# Patient Record
Sex: MALE | Race: WHITE | NOT HISPANIC OR LATINO | Employment: UNEMPLOYED | URBAN - METROPOLITAN AREA
[De-identification: names, ages, dates, MRNs, and addresses within clinical notes are randomized per-mention and may not be internally consistent; named-entity substitution may affect disease eponyms.]

---

## 2018-01-03 ENCOUNTER — HOSPITAL ENCOUNTER (OUTPATIENT)
Dept: RADIOLOGY | Facility: MEDICAL CENTER | Age: 83
End: 2018-01-03
Attending: UROLOGY
Payer: MEDICARE

## 2018-01-03 DIAGNOSIS — C67.9 MALIGNANT NEOPLASM OF URINARY BLADDER, UNSPECIFIED SITE (HCC): ICD-10-CM

## 2018-01-03 PROCEDURE — A9552 F18 FDG: HCPCS

## 2018-06-15 ENCOUNTER — HOSPITAL ENCOUNTER (OUTPATIENT)
Facility: MEDICAL CENTER | Age: 83
End: 2018-06-15
Payer: MEDICARE

## 2018-06-26 ENCOUNTER — HOSPITAL ENCOUNTER (INPATIENT)
Facility: MEDICAL CENTER | Age: 83
LOS: 22 days | DRG: 329 | End: 2018-07-18
Attending: EMERGENCY MEDICINE | Admitting: SURGERY
Payer: MEDICARE

## 2018-06-26 ENCOUNTER — APPOINTMENT (OUTPATIENT)
Dept: RADIOLOGY | Facility: MEDICAL CENTER | Age: 83
DRG: 329 | End: 2018-06-26
Attending: HOSPITALIST
Payer: MEDICARE

## 2018-06-26 ENCOUNTER — HOSPITAL ENCOUNTER (OUTPATIENT)
Dept: RADIOLOGY | Facility: MEDICAL CENTER | Age: 83
End: 2018-06-26

## 2018-06-26 DIAGNOSIS — Z98.890 HISTORY OF UROSTOMY: ICD-10-CM

## 2018-06-26 DIAGNOSIS — E87.20 LACTIC ACID ACIDOSIS: ICD-10-CM

## 2018-06-26 DIAGNOSIS — K56.609 SBO (SMALL BOWEL OBSTRUCTION) (HCC): ICD-10-CM

## 2018-06-26 DIAGNOSIS — E78.5 HYPERLIPIDEMIA, UNSPECIFIED HYPERLIPIDEMIA TYPE: ICD-10-CM

## 2018-06-26 DIAGNOSIS — I10 ESSENTIAL HYPERTENSION: ICD-10-CM

## 2018-06-26 DIAGNOSIS — Z85.51 HISTORY OF BLADDER CANCER: ICD-10-CM

## 2018-06-26 PROBLEM — E11.9 T2DM (TYPE 2 DIABETES MELLITUS) (HCC): Status: ACTIVE | Noted: 2018-06-26

## 2018-06-26 LAB
ALBUMIN SERPL BCP-MCNC: 3.4 G/DL (ref 3.2–4.9)
ALBUMIN/GLOB SERPL: 1.2 G/DL
ALP SERPL-CCNC: 62 U/L (ref 30–99)
ALT SERPL-CCNC: 42 U/L (ref 2–50)
ANION GAP SERPL CALC-SCNC: 9 MMOL/L (ref 0–11.9)
APPEARANCE UR: ABNORMAL
AST SERPL-CCNC: 25 U/L (ref 12–45)
BACTERIA #/AREA URNS HPF: ABNORMAL /HPF
BASOPHILS # BLD AUTO: 0.4 % (ref 0–1.8)
BASOPHILS # BLD: 0.04 K/UL (ref 0–0.12)
BILIRUB SERPL-MCNC: 0.4 MG/DL (ref 0.1–1.5)
BILIRUB UR QL STRIP.AUTO: NEGATIVE
BUN SERPL-MCNC: 30 MG/DL (ref 8–22)
CALCIUM SERPL-MCNC: 8.6 MG/DL (ref 8.5–10.5)
CHLORIDE SERPL-SCNC: 106 MMOL/L (ref 96–112)
CO2 SERPL-SCNC: 20 MMOL/L (ref 20–33)
COLOR UR: YELLOW
CREAT SERPL-MCNC: 1.12 MG/DL (ref 0.5–1.4)
EKG IMPRESSION: NORMAL
EOSINOPHIL # BLD AUTO: 0.01 K/UL (ref 0–0.51)
EOSINOPHIL NFR BLD: 0.1 % (ref 0–6.9)
EPI CELLS #/AREA URNS HPF: ABNORMAL /HPF
ERYTHROCYTE [DISTWIDTH] IN BLOOD BY AUTOMATED COUNT: 52.2 FL (ref 35.9–50)
GLOBULIN SER CALC-MCNC: 2.9 G/DL (ref 1.9–3.5)
GLUCOSE SERPL-MCNC: 156 MG/DL (ref 65–99)
GLUCOSE UR STRIP.AUTO-MCNC: NEGATIVE MG/DL
HCT VFR BLD AUTO: 33.4 % (ref 42–52)
HGB BLD-MCNC: 11 G/DL (ref 14–18)
HYALINE CASTS #/AREA URNS LPF: ABNORMAL /LPF
IMM GRANULOCYTES # BLD AUTO: 0.2 K/UL (ref 0–0.11)
IMM GRANULOCYTES NFR BLD AUTO: 2 % (ref 0–0.9)
KETONES UR STRIP.AUTO-MCNC: NEGATIVE MG/DL
LACTATE BLD-SCNC: 1.7 MMOL/L (ref 0.5–2)
LACTATE BLD-SCNC: 2.1 MMOL/L (ref 0.5–2)
LACTATE BLD-SCNC: 2.4 MMOL/L (ref 0.5–2)
LEUKOCYTE ESTERASE UR QL STRIP.AUTO: ABNORMAL
LIPASE SERPL-CCNC: 12 U/L (ref 11–82)
LYMPHOCYTES # BLD AUTO: 0.48 K/UL (ref 1–4.8)
LYMPHOCYTES NFR BLD: 4.7 % (ref 22–41)
MCH RBC QN AUTO: 30.4 PG (ref 27–33)
MCHC RBC AUTO-ENTMCNC: 32.9 G/DL (ref 33.7–35.3)
MCV RBC AUTO: 92.3 FL (ref 81.4–97.8)
MICRO URNS: ABNORMAL
MONOCYTES # BLD AUTO: 0.66 K/UL (ref 0–0.85)
MONOCYTES NFR BLD AUTO: 6.5 % (ref 0–13.4)
NEUTROPHILS # BLD AUTO: 8.78 K/UL (ref 1.82–7.42)
NEUTROPHILS NFR BLD: 86.3 % (ref 44–72)
NITRITE UR QL STRIP.AUTO: POSITIVE
NRBC # BLD AUTO: 0 K/UL
NRBC BLD-RTO: 0 /100 WBC
PH UR STRIP.AUTO: 6.5 [PH]
PLATELET # BLD AUTO: 184 K/UL (ref 164–446)
PMV BLD AUTO: 9.9 FL (ref 9–12.9)
POTASSIUM SERPL-SCNC: 3.8 MMOL/L (ref 3.6–5.5)
PROT SERPL-MCNC: 6.3 G/DL (ref 6–8.2)
PROT UR QL STRIP: NEGATIVE MG/DL
RBC # BLD AUTO: 3.62 M/UL (ref 4.7–6.1)
RBC # URNS HPF: ABNORMAL /HPF
RBC UR QL AUTO: ABNORMAL
RENAL EPI CELLS #/AREA URNS HPF: ABNORMAL /HPF
SODIUM SERPL-SCNC: 135 MMOL/L (ref 135–145)
SP GR UR STRIP.AUTO: 1.01
TROPONIN I SERPL-MCNC: 0.02 NG/ML (ref 0–0.04)
UROBILINOGEN UR STRIP.AUTO-MCNC: 0.2 MG/DL
WBC # BLD AUTO: 10.2 K/UL (ref 4.8–10.8)
WBC #/AREA URNS HPF: ABNORMAL /HPF

## 2018-06-26 PROCEDURE — 96374 THER/PROPH/DIAG INJ IV PUSH: CPT

## 2018-06-26 PROCEDURE — 93005 ELECTROCARDIOGRAM TRACING: CPT | Performed by: EMERGENCY MEDICINE

## 2018-06-26 PROCEDURE — 700111 HCHG RX REV CODE 636 W/ 250 OVERRIDE (IP): Performed by: EMERGENCY MEDICINE

## 2018-06-26 PROCEDURE — 96376 TX/PRO/DX INJ SAME DRUG ADON: CPT

## 2018-06-26 PROCEDURE — 96375 TX/PRO/DX INJ NEW DRUG ADDON: CPT

## 2018-06-26 PROCEDURE — 85025 COMPLETE CBC W/AUTO DIFF WBC: CPT

## 2018-06-26 PROCEDURE — 84484 ASSAY OF TROPONIN QUANT: CPT

## 2018-06-26 PROCEDURE — 83690 ASSAY OF LIPASE: CPT

## 2018-06-26 PROCEDURE — 700111 HCHG RX REV CODE 636 W/ 250 OVERRIDE (IP): Performed by: HOSPITALIST

## 2018-06-26 PROCEDURE — 99223 1ST HOSP IP/OBS HIGH 75: CPT | Mod: AI | Performed by: HOSPITALIST

## 2018-06-26 PROCEDURE — 80053 COMPREHEN METABOLIC PANEL: CPT

## 2018-06-26 PROCEDURE — 36415 COLL VENOUS BLD VENIPUNCTURE: CPT

## 2018-06-26 PROCEDURE — 81001 URINALYSIS AUTO W/SCOPE: CPT

## 2018-06-26 PROCEDURE — 770006 HCHG ROOM/CARE - MED/SURG/GYN SEMI*

## 2018-06-26 PROCEDURE — 83605 ASSAY OF LACTIC ACID: CPT

## 2018-06-26 PROCEDURE — 700101 HCHG RX REV CODE 250: Performed by: EMERGENCY MEDICINE

## 2018-06-26 PROCEDURE — 700105 HCHG RX REV CODE 258: Performed by: HOSPITALIST

## 2018-06-26 PROCEDURE — 99285 EMERGENCY DEPT VISIT HI MDM: CPT

## 2018-06-26 RX ORDER — LABETALOL HYDROCHLORIDE 5 MG/ML
10 INJECTION, SOLUTION INTRAVENOUS EVERY 4 HOURS PRN
Status: DISCONTINUED | OUTPATIENT
Start: 2018-06-26 | End: 2018-07-18 | Stop reason: HOSPADM

## 2018-06-26 RX ORDER — METOPROLOL SUCCINATE 25 MG/1
25 TABLET, EXTENDED RELEASE ORAL DAILY
COMMUNITY
End: 2018-12-27

## 2018-06-26 RX ORDER — LORAZEPAM 2 MG/ML
0.5 INJECTION INTRAMUSCULAR EVERY 6 HOURS PRN
Status: DISCONTINUED | OUTPATIENT
Start: 2018-06-26 | End: 2018-06-29

## 2018-06-26 RX ORDER — MORPHINE SULFATE 4 MG/ML
4 INJECTION, SOLUTION INTRAMUSCULAR; INTRAVENOUS
Status: DISCONTINUED | OUTPATIENT
Start: 2018-06-26 | End: 2018-06-29

## 2018-06-26 RX ORDER — MORPHINE SULFATE 4 MG/ML
4 INJECTION, SOLUTION INTRAMUSCULAR; INTRAVENOUS ONCE
Status: COMPLETED | OUTPATIENT
Start: 2018-06-26 | End: 2018-06-26

## 2018-06-26 RX ORDER — OXYCODONE HYDROCHLORIDE 5 MG/1
5 TABLET ORAL
Status: DISCONTINUED | OUTPATIENT
Start: 2018-06-26 | End: 2018-06-29

## 2018-06-26 RX ORDER — LORAZEPAM 1 MG/1
0.5 TABLET ORAL EVERY 6 HOURS PRN
Status: DISCONTINUED | OUTPATIENT
Start: 2018-06-26 | End: 2018-06-29

## 2018-06-26 RX ORDER — SODIUM CHLORIDE 9 MG/ML
INJECTION, SOLUTION INTRAVENOUS CONTINUOUS
Status: DISCONTINUED | OUTPATIENT
Start: 2018-06-26 | End: 2018-06-29

## 2018-06-26 RX ORDER — OXYCODONE HYDROCHLORIDE 10 MG/1
10 TABLET ORAL
Status: DISCONTINUED | OUTPATIENT
Start: 2018-06-26 | End: 2018-06-29

## 2018-06-26 RX ORDER — DEXTROSE MONOHYDRATE, SODIUM CHLORIDE, AND POTASSIUM CHLORIDE 50; 1.49; 4.5 G/1000ML; G/1000ML; G/1000ML
INJECTION, SOLUTION INTRAVENOUS ONCE
Status: COMPLETED | OUTPATIENT
Start: 2018-06-26 | End: 2018-06-26

## 2018-06-26 RX ORDER — HYDROMORPHONE HYDROCHLORIDE 2 MG/ML
1 INJECTION, SOLUTION INTRAMUSCULAR; INTRAVENOUS; SUBCUTANEOUS ONCE
Status: COMPLETED | OUTPATIENT
Start: 2018-06-26 | End: 2018-06-26

## 2018-06-26 RX ORDER — ONDANSETRON 2 MG/ML
4 INJECTION INTRAMUSCULAR; INTRAVENOUS EVERY 4 HOURS PRN
Status: DISCONTINUED | OUTPATIENT
Start: 2018-06-26 | End: 2018-07-01

## 2018-06-26 RX ADMIN — MORPHINE SULFATE 4 MG: 4 INJECTION INTRAVENOUS at 13:48

## 2018-06-26 RX ADMIN — HYDROMORPHONE HYDROCHLORIDE 1 MG: 2 INJECTION INTRAMUSCULAR; INTRAVENOUS; SUBCUTANEOUS at 04:05

## 2018-06-26 RX ADMIN — POTASSIUM CHLORIDE, DEXTROSE MONOHYDRATE AND SODIUM CHLORIDE: 150; 5; 450 INJECTION, SOLUTION INTRAVENOUS at 02:39

## 2018-06-26 RX ADMIN — ONDANSETRON 4 MG: 2 INJECTION INTRAMUSCULAR; INTRAVENOUS at 10:37

## 2018-06-26 RX ADMIN — MORPHINE SULFATE 4 MG: 4 INJECTION INTRAVENOUS at 02:18

## 2018-06-26 RX ADMIN — ONDANSETRON 4 MG: 2 INJECTION INTRAMUSCULAR; INTRAVENOUS at 17:58

## 2018-06-26 RX ADMIN — HYDROMORPHONE HYDROCHLORIDE 1 MG: 2 INJECTION, SOLUTION INTRAMUSCULAR; INTRAVENOUS; SUBCUTANEOUS at 02:49

## 2018-06-26 RX ADMIN — MORPHINE SULFATE 4 MG: 4 INJECTION INTRAVENOUS at 17:59

## 2018-06-26 RX ADMIN — MORPHINE SULFATE 4 MG: 4 INJECTION INTRAVENOUS at 09:24

## 2018-06-26 RX ADMIN — SODIUM CHLORIDE: 9 INJECTION, SOLUTION INTRAVENOUS at 09:23

## 2018-06-26 RX ADMIN — MORPHINE SULFATE 4 MG: 4 INJECTION INTRAVENOUS at 06:14

## 2018-06-26 RX ADMIN — LORAZEPAM 0.5 MG: 2 INJECTION INTRAMUSCULAR; INTRAVENOUS at 14:06

## 2018-06-26 RX ADMIN — ONDANSETRON 4 MG: 2 INJECTION INTRAMUSCULAR; INTRAVENOUS at 06:14

## 2018-06-26 RX ADMIN — SODIUM CHLORIDE: 9 INJECTION, SOLUTION INTRAVENOUS at 22:03

## 2018-06-26 ASSESSMENT — ENCOUNTER SYMPTOMS
PHOTOPHOBIA: 0
TINGLING: 0
COUGH: 0
NAUSEA: 1
SORE THROAT: 0
FOCAL WEAKNESS: 0
ABDOMINAL PAIN: 1
DIZZINESS: 0
FEVER: 0
PALPITATIONS: 0
HEADACHES: 0
SHORTNESS OF BREATH: 0
DEPRESSION: 0
WHEEZING: 0
CHILLS: 0
MYALGIAS: 0
VOMITING: 0
DIARRHEA: 0

## 2018-06-26 ASSESSMENT — PAIN SCALES - GENERAL
PAINLEVEL_OUTOF10: 7
PAINLEVEL_OUTOF10: 6
PAINLEVEL_OUTOF10: 6
PAINLEVEL_OUTOF10: 7
PAINLEVEL_OUTOF10: 4
PAINLEVEL_OUTOF10: 5
PAINLEVEL_OUTOF10: 6
PAINLEVEL_OUTOF10: 8
PAINLEVEL_OUTOF10: 8
PAINLEVEL_OUTOF10: 4
PAINLEVEL_OUTOF10: 7

## 2018-06-26 ASSESSMENT — PATIENT HEALTH QUESTIONNAIRE - PHQ9
1. LITTLE INTEREST OR PLEASURE IN DOING THINGS: NOT AT ALL
SUM OF ALL RESPONSES TO PHQ9 QUESTIONS 1 AND 2: 0
2. FEELING DOWN, DEPRESSED, IRRITABLE, OR HOPELESS: NOT AT ALL

## 2018-06-26 ASSESSMENT — LIFESTYLE VARIABLES
TOTAL SCORE: 0
HAVE PEOPLE ANNOYED YOU BY CRITICIZING YOUR DRINKING: NO
HAVE YOU EVER FELT YOU SHOULD CUT DOWN ON YOUR DRINKING: NO
EVER_SMOKED: YES
ALCOHOL_USE: YES
TOTAL SCORE: 0
EVER FELT BAD OR GUILTY ABOUT YOUR DRINKING: NO
EVER HAD A DRINK FIRST THING IN THE MORNING TO STEADY YOUR NERVES TO GET RID OF A HANGOVER: NO
ON A TYPICAL DAY WHEN YOU DRINK ALCOHOL HOW MANY DRINKS DO YOU HAVE: 1
AVERAGE NUMBER OF DAYS PER WEEK YOU HAVE A DRINK CONTAINING ALCOHOL: 7
HOW MANY TIMES IN THE PAST YEAR HAVE YOU HAD 5 OR MORE DRINKS IN A DAY: 0
CONSUMPTION TOTAL: NEGATIVE
TOTAL SCORE: 0

## 2018-06-26 NOTE — ED PROVIDER NOTES
ED PROVIDER NOTE     Scribed for Kolton Jordan M.D. by Jarett Linares. 6/26/2018, 1:39 AM.    CHIEF COMPLAINT  Chief Complaint   Patient presents with   • Abdominal Pain       HPI    [facility]  Means of arrival: EMS  History obtained from: Patient  History limited by: None    Florian Trevizo is a 83 y.o. male who presents via EMS as a transfer from [place], [place] with a bowel obstruction confirmed with a CT at outside hospital. The patient reports he was eating dinner yesterday evening when suddenly he felt ill and had moderate abdominal pain and vomiting. Reports his last bowel movement was over two days ago. This is the patient's 2nd bowel obstruction. His last abdominal surgery reports to be on 2/12/18. Upon exam, he confirms feeling significantly improved after NG tube placed at outside hospital. Associated nausea and vomiting. No exacerbating factors noted. He denies fever, cough, chest pain, shortness of breath, or any other symptoms.     The patient has a past surgical history of an appendectomy, bladder removal, urostomy placement. He is currently receiving chemotherapy secondary to bladder cancer. Florian leonard received treatment on 6/12/18. [facility]    REVIEW OF SYSTEMS  Constitutional: Negative for fever or chills.   HENT: Negative for nosebleeds or sore throat.    Respiratory: Negative for cough or shortness of breath.    Cardiovascular: Negative for chest pain or palpitations.   Gastrointestinal: Positive for nausea, vomiting, abdominal pain.   Musculoskeletal: Negative for back pain or joint pain.   Skin: Negative for itching or rash.   Neurological: Negative for sensory or motor changes.   All other systems reviewed and are negative. C.     PAST MEDICAL HISTORY   has a past medical history of Cancer (HCC); Mitral valve prolapse; and Pacemaker.    PAST FAMILY HISTORY  History reviewed. No pertinent family history.    SOCIAL HISTORY  Social History  "    Social History Main Topics   • Smoking status: Former Smoker     Types: Cigars     Quit date: 6/5/2018   • Smokeless tobacco: Never Used   • Alcohol use Yes      Comment: 1 drink/day   • Drug use: No       SURGICAL HISTORY   has a past surgical history that includes other abdominal surgery (02/2018); appendectomy; and penile prosthesis insertion.    CURRENT MEDICATIONS  Home Medications     Reviewed by Shruthi Agrawal R.N. (Registered Nurse) on 06/26/18 at 0602  Med List Status: Not Addressed   Medication Last Dose Status   metoprolol SR (TOPROL XL) 25 MG TABLET SR 24 HR  Active                ALLERGIES  No Known Allergies    PHYSICAL EXAM  VITAL SIGNS: /85   Pulse 81   Temp 36.5 °C (97.7 °F)   Resp 18   Ht 1.854 m (6' 1\")   Wt 59.4 kg (131 lb)   BMI 17.28 kg/m²    Pulse ox interpretation: On room air, I interpret this pulse ox as normal.  Constitutional: Chronically ill-appearing in mild distress.  HEENT: Normocephalic, atraumatic. Posterior pharynx clear, mucous membranes dry. NGT in place.   Eyes:  EOMI. Normal sclera. Pale conjunctivae.  Neck: Supple, Full range of motion, nontender.  Chest/Pulmonary: Clear to ausculation bilaterally, no wheezes or rhonchi.  Cardiovascular: Regular rate and rhythm, no murmur.   Abdomen: Soft, mild epigastric tenderness, no rebound, guarding, or masses. Urostomy tube site c/d/I with yellow urine in bag.  Back: No CVA tenderness, nontender midline, no step offs.  Musculoskeletal: No deformity, no edema.  Neuro: Clear speech, normal coordination, cranial nerves II-XII grossly intact.  Psych: Normal mood and affect.  Skin: No rashes, warm and dry.    DIAGNOSTIC STUDIES / PROCEDURES    LABS & EKG  Results for orders placed or performed during the hospital encounter of 06/26/18   LACTIC ACID   Result Value Ref Range    Lactic Acid 2.4 (H) 0.5 - 2.0 mmol/L   CBC WITH DIFFERENTIAL   Result Value Ref Range    WBC 10.2 4.8 - 10.8 K/uL    RBC 3.62 (L) 4.70 - 6.10 M/uL "    Hemoglobin 11.0 (L) 14.0 - 18.0 g/dL    Hematocrit 33.4 (L) 42.0 - 52.0 %    MCV 92.3 81.4 - 97.8 fL    MCH 30.4 27.0 - 33.0 pg    MCHC 32.9 (L) 33.7 - 35.3 g/dL    RDW 52.2 (H) 35.9 - 50.0 fL    Platelet Count 184 164 - 446 K/uL    MPV 9.9 9.0 - 12.9 fL    Neutrophils-Polys 86.30 (H) 44.00 - 72.00 %    Lymphocytes 4.70 (L) 22.00 - 41.00 %    Monocytes 6.50 0.00 - 13.40 %    Eosinophils 0.10 0.00 - 6.90 %    Basophils 0.40 0.00 - 1.80 %    Immature Granulocytes 2.00 (H) 0.00 - 0.90 %    Nucleated RBC 0.00 /100 WBC    Neutrophils (Absolute) 8.78 (H) 1.82 - 7.42 K/uL    Lymphs (Absolute) 0.48 (L) 1.00 - 4.80 K/uL    Monos (Absolute) 0.66 0.00 - 0.85 K/uL    Eos (Absolute) 0.01 0.00 - 0.51 K/uL    Baso (Absolute) 0.04 0.00 - 0.12 K/uL    Immature Granulocytes (abs) 0.20 (H) 0.00 - 0.11 K/uL    NRBC (Absolute) 0.00 K/uL   CMP   Result Value Ref Range    Sodium 135 135 - 145 mmol/L    Potassium 3.8 3.6 - 5.5 mmol/L    Chloride 106 96 - 112 mmol/L    Co2 20 20 - 33 mmol/L    Anion Gap 9.0 0.0 - 11.9    Glucose 156 (H) 65 - 99 mg/dL    Bun 30 (H) 8 - 22 mg/dL    Creatinine 1.12 0.50 - 1.40 mg/dL    Calcium 8.6 8.5 - 10.5 mg/dL    AST(SGOT) 25 12 - 45 U/L    ALT(SGPT) 42 2 - 50 U/L    Alkaline Phosphatase 62 30 - 99 U/L    Total Bilirubin 0.4 0.1 - 1.5 mg/dL    Albumin 3.4 3.2 - 4.9 g/dL    Total Protein 6.3 6.0 - 8.2 g/dL    Globulin 2.9 1.9 - 3.5 g/dL    A-G Ratio 1.2 g/dL   LIPASE   Result Value Ref Range    Lipase 12 11 - 82 U/L   TROPONIN   Result Value Ref Range    Troponin I 0.02 0.00 - 0.04 ng/mL   ESTIMATED GFR   Result Value Ref Range    GFR If African American >60 >60 mL/min/1.73 m 2    GFR If Non African American >60 >60 mL/min/1.73 m 2   LACTIC ACID   Result Value Ref Range    Lactic Acid 1.7 0.5 - 2.0 mmol/L   LACTIC ACID   Result Value Ref Range    Lactic Acid 2.1 (H) 0.5 - 2.0 mmol/L   URINALYSIS   Result Value Ref Range    Color Yellow     Character Hazy (A)     Specific Gravity 1.010 <1.035    Ph 6.5  5.0 - 8.0    Glucose Negative Negative mg/dL    Ketones Negative Negative mg/dL    Protein Negative Negative mg/dL    Bilirubin Negative Negative    Urobilinogen, Urine 0.2 Negative    Nitrite Positive (A) Negative    Leukocyte Esterase Moderate (A) Negative    Occult Blood Trace (A) Negative    Micro Urine Req Microscopic    URINE MICROSCOPIC (W/UA)   Result Value Ref Range    WBC 20-50 (A) /hpf    RBC 2-5 (A) /hpf    Bacteria Many (A) None /hpf    Epithelial Cells Rare /hpf    Epithelial Cells Renal Few /hpf    Hyaline Cast 3-5 (A) /lpf   EKG (ER)   Result Value Ref Range    Report       [facility] Emergency Dept.    Test Date:  2018  Pt Name:    CHICHO NAVA                 Department: ER  MRN:        [id number]                      Room:       T4  Gender:     Male                         Technician: [id number]  :        [id number]                   Requested By:KOLTON TURPIN  Order #:    [id number]                    Reading MD: Kolton Turpin MD    Measurements  Intervals                                Axis  Rate:       70                           P:          124  VT:         216                          QRS:        54  QRSD:       94                           T:          53  QT:         428  QTc:        462    Interpretive Statements  ATRIAL-PACED COMPLEXES  BORDERLINE AV CONDUCTION DELAY  no STEMI  No previous ECG available for comparison    Electronically Signed On 2018 18:09:18 PDT by Kolton Turpin MD           COURSE & MEDICAL DECISION MAKING    This is a 83 y.o. male who presents with vomiting and abdominal pain, dx'd with SBO at outside hospital w/o surgical coverage.     Differential Diagnosis includes but is not limited to:  Small bowel obstruction, Malignancy, ACS, Hepatobiliary, Dehydration.     1:40 AM Patient was evaluated at bedside. Ordered for Lactic Acid, CBC with Differential, CMP, Lipase, Troponin, and EKG to evaluate. Patient will be treated with Dextrose  5% and 0.45% NaCl with KCl 20 mEq MIVF infusion since needs to be kept NPO.     1:48 AM Paged General Surgery.     1:56 AM I discussed the patient's case and the above findings with Dr. Bobby ([facility]) who agrees to consult, requests medical admission.    2:13 AM Patient will be treated with Morphine Injection 4 mg.     2:23 AM I discussed the patient's case and the above findings with Dr. Bonilla (Hospitalist) who agrees to admit the patient.     Labs stable aside from mild acidosis. IV fluids infusing. Pain medications ordered, stable vitals. He is hemodynamically stable for admission to medical floor in guarded condition, NGT still draining well. Outside CT reviewed.       Medications   NS infusion ( Intravenous New Bag 6/26/18 0964)   ondansetron (ZOFRAN) syringe/vial injection 4 mg (4 mg Intravenous Given 6/26/18 1758)   LORazepam (ATIVAN) tablet 0.5 mg ( Oral See Alternative 6/26/18 1406)     Or   LORazepam (ATIVAN) injection 0.5 mg (0.5 mg Intravenous Given 6/26/18 1406)   Pharmacy Consult Request ...Pain Management Review (not administered)     And   oxyCODONE immediate-release (ROXICODONE) tablet 5 mg (not administered)     And   oxyCODONE immediate release (ROXICODONE) tablet 10 mg (not administered)     And   morphine (pf) 4 mg/ml injection 4 mg (4 mg Intravenous Given 6/26/18 1759)   labetalol (NORMODYNE,TRANDATE) injection 10 mg (not administered)   dextrose 5 % and 0.45 % NaCl with KCl 20 mEq ( Intravenous New Bag 6/26/18 0239)   morphine (pf) 4 mg/ml injection 4 mg (4 mg Intravenous Given 6/26/18 0218)   HYDROmorphone (DILAUDID) injection 1 mg (1 mg Intravenous Given 6/26/18 0249)   HYDROmorphone (DILAUDID) injection 1 mg (1 mg Intravenous Given 6/26/18 0405)     FINAL IMPRESSION  1. SBO (small bowel obstruction) (HCC)    2. History of urostomy    3. History of bladder cancer    4. Essential hypertension    5. Hyperlipidemia, unspecified hyperlipidemia type    6. Lactic acid acidosis           -ADMIT-    Pertinent Labs & Imaging studies reviewed and verified by myself, as well as nursing notes and the patient's past medical, family, and social histories (See chart for details).    Results, exam findings, clinical impression, presumed diagnosis, treatment options, and plan to admit were discussed with the patient, and they verbalized understanding, agreed with, and appreciated the plan of care.    Jarett ZUNIGA (Scribe), am scribing for, and in the presence of, Kolton Jordan M.D..    Electronically signed by: Jarett Linares (Scribe), 6/26/2018    IKolton M.D. personally performed the services described in this documentation, as scribed by Jarett Linares in my presence, and it is both accurate and complete.    Portions of this record were made with voice recognition software.  Despite my review, spelling/grammar/context errors may still remain.  Interpretation of this chart should be taken in this context.    The note accurately reflects work and decisions made by me.  Kolton Jordan  6/26/2018  6:08 PM

## 2018-06-26 NOTE — ASSESSMENT & PLAN NOTE
Postoperative day #16 after exploratory laparotomy.      -clinically has resolved, good flatus and BM's  -oral intake remains somewhat low  -no abdominal pain, no N/V  -Hold TPN, but may need to re-start if oral intake remains low  -will touch base with GS  -ambulate TID, patient has been refusing

## 2018-06-26 NOTE — CARE PLAN
Problem: Safety  Goal: Will remain free from falls  Outcome: PROGRESSING AS EXPECTED  Bed alarm on for safety    Problem: Pain Management  Goal: Pain level will decrease to patient's comfort goal  Outcome: PROGRESSING AS EXPECTED  Pain being managed with IV morphine and dilaudid

## 2018-06-26 NOTE — PROGRESS NOTES
2 RN skin completed with Bessie.  All skin assessed.  Redness to BLE on forearms near elbows.  Urostomy noted.  No other skin issues.

## 2018-06-26 NOTE — H&P
" Hospital Medicine History and Physical    Date of Service  6/26/2018    Chief Complaint  Chief Complaint   Patient presents with   • Abdominal Pain       History of Presenting Illness  83 y.o. male who presented on 6/26/2018 in transfer from outside hospital for small bowel obstruction. [facility] for a [facility] bowel obstruction. He had been treated conservatively with an NG tube and when he had clinical improvement, they had begun to advance his diet without any issues. His NG tube was discontinued \"5 minutes before I left\". He has been home for the last 2 days and has had no issues. He states that he eaten multiple meals including cornflakes, pancakes, and eggs. However, last night when he had dinner which was spaghetti, he had return of his abdominal pain. It is centralized, nonradiating, and associated with nausea and vomiting. He denies any alleviating factors. He was seen at an outside hospital in [facility] where a CT scan was obtained and showed a small bowel obstruction. Upon arrival here, he has had resolution of his nausea since we place an NG tube however he continues to have abdominal pain. He has had multiple abdominal surgeries in the past including a neobladder creation and therefore carries multiple risk factors for obstruction secondary to adhesions. He denies any fevers, chills, headache, chest pain, diarrhea or dysuria. He had a bowel movement 2 mornings ago, he last had flatus earlier today.      Primary Care Physician  Pcp Unknown    Consultants  Dr. Gardner, surgery    Code Status  Full    Review of Systems  Review of Systems   Constitutional: Negative for chills and fever.   HENT: Negative for congestion and sore throat.    Eyes: Negative for photophobia.   Respiratory: Negative for cough, shortness of breath and wheezing.    Cardiovascular: Negative for chest pain and palpitations.   Gastrointestinal: Positive for abdominal " pain and nausea. Negative for diarrhea and vomiting.   Genitourinary: Negative for dysuria.   Musculoskeletal: Negative for myalgias.   Skin: Negative.    Neurological: Negative for dizziness, tingling, focal weakness and headaches.   Psychiatric/Behavioral: Negative for depression and suicidal ideas.        Past Medical History  Past Medical History:   Diagnosis Date   • Cancer (HCC)     bladder, prostate   • Mitral valve prolapse    • Pacemaker        Surgical History  Past Surgical History:   Procedure Laterality Date   • OTHER ABDOMINAL SURGERY  2018    bladder and prostate removal, urostomy placed   • APPENDECTOMY     • PENILE PROSTHESIS INSERTION         Medications  No current facility-administered medications on file prior to encounter.      No current outpatient prescriptions on file prior to encounter.       Family History  Father and multiple brothers with prostate cancer    Social History  Social History   Substance Use Topics   • Smoking status: Former Smoker     Types: Cigars     Quit date: 2018   • Smokeless tobacco: Never Used   • Alcohol use Yes      Comment: 1 drink/day       Allergies  No Known Allergies     Physical Exam  Laboratory   Hemodynamics  Temp (24hrs), Av.5 °C (97.7 °F), Min:36.5 °C (97.7 °F), Max:36.5 °C (97.7 °F)   Temperature: 36.5 °C (97.7 °F)  Pulse  Av  Min: 75  Max: 81    Blood Pressure : 149/78      Respiratory      Respiration: 18             Physical Exam   Constitutional: He is oriented to person, place, and time. No distress.   HENT:   Head: Normocephalic and atraumatic.   Right Ear: External ear normal.   Left Ear: External ear normal.   Eyes: EOM are normal. Right eye exhibits no discharge. Left eye exhibits no discharge.   Neck: Neck supple. No JVD present.   Cardiovascular: Normal rate, regular rhythm and normal heart sounds.    Pulmonary/Chest: Effort normal and breath sounds normal. No respiratory distress. He exhibits no tenderness.   Abdominal: There  is tenderness.   Hypoactive bowel sounds at best   Musculoskeletal: He exhibits no edema.   Neurological: He is alert and oriented to person, place, and time. No cranial nerve deficit.   Skin: Skin is dry. He is not diaphoretic. No erythema.   Psychiatric: He has a normal mood and affect. His behavior is normal.   Nursing note and vitals reviewed.    Capillary refill less than 3 seconds, distal pulses intact    Recent Labs      06/26/18   0255   WBC  10.2   RBC  3.62*   HEMOGLOBIN  11.0*   HEMATOCRIT  33.4*   MCV  92.3   MCH  30.4   MCHC  32.9*   RDW  52.2*   PLATELETCT  184   MPV  9.9         No results for input(s): ALTSGPT, ASTSGOT, ALKPHOSPHAT, TBILIRUBIN, DBILIRUBIN, GAMMAGT, AMYLASE, LIPASE, ALB, PREALBUMIN, GLUCOSE in the last 72 hours.              No results found for: TROPONINI    Imaging  No results found.   Assessment/Plan     Anticipate that patient will need greater than 2 midnights for management of the discussed medical issues.    * SBO (small bowel obstruction) ([facility])   Assessment & Plan    The patient will be admitted to the surgical floor, we will keep him nothing by mouth for bowel rest while we await return of bowel function. He will be on IV fluid hydration and symptomatic management for pain and nausea. Dr. Gardner of surgery was consulted and I look forward to his recommendations. In the meantime, we will continue NG tube to intermittent low wall suction.        T2DM (type 2 diabetes mellitus) ([facility])   Assessment & Plan    No long-acting insulin while nothing by mouth, monitor with Accu-Cheks and cover with insulin sliding scale.        HLD (hyperlipidemia)   Assessment & Plan    Holding home statin for now while he is nothing by mouth. Resume once cleared by surgery for by mouth intake.        HTN (hypertension)   Assessment & Plan    Holding home antihypertensives for now while he is nothing by mouth, cover with when necessary IV antihypertensives.          Prophylaxis: Sequential  compression devices for DVT prophylaxis, no PPI indicated    I spent 31 minutes in additional non-face to face time reviewing charts transferred from outside facility including review of records, pertinent lab data and studies in order to coordinate care and plan therapeutic interventions, specialist consultation where needed and future disposition of care.

## 2018-06-26 NOTE — PROGRESS NOTES
Bedside report received.    Assessment complete.  A&O x 4. Patient calls appropriately.  Patient ambualtes with standby assist.   Patient has 8/10 pain. medicated per Mar.  Denies N&V. Patient NPO at this time. NG tube in place left nare to low continuous suction.  + void, - flatus  Patient denies SOB.    Review plan with of care with patient. Call light and personal belongings with in reach. Hourly rounding in place. All needs met at this time.

## 2018-06-26 NOTE — CONSULTS
Surgical Consult  Date: 6/26/2018    Requesting Physician: Julian    Consulting Physician: Emanuel Bobby M.D. [facility]    Reason for consultation: bowel obstruction      HPI: This is a 83 y.o. male who [facility]. He was treated conservatively there and discharged. At home, he was ok for two days, but had sudden onset of diffuse abdominal pain. Pain was severe and cramping. Associated with nausea, emesis, constipation, and obstipation yesterday. He has history of multiple abdominal operations.     Past Medical History:   Diagnosis Date   • Cancer (HCC)     bladder, prostate   • Mitral valve prolapse    • Pacemaker        Past Surgical History:   Procedure Laterality Date   • OTHER ABDOMINAL SURGERY  02/2018    bladder and prostate removal, urostomy placed   • APPENDECTOMY     • PENILE PROSTHESIS INSERTION         Current Facility-Administered Medications   Medication Dose Route Frequency Provider Last Rate Last Dose   • NS infusion   Intravenous Continuous Ene Bonilla M.D. 125 mL/hr at 06/26/18 0923     • ondansetron (ZOFRAN) syringe/vial injection 4 mg  4 mg Intravenous Q4HRS PRAVILA Bonilla M.D.   4 mg at 06/26/18 0614   • LORazepam (ATIVAN) tablet 0.5 mg  0.5 mg Oral Q6HRS PRAVILA Bonilla M.D.        Or   • LORazepam (ATIVAN) injection 0.5 mg  0.5 mg Intravenous Q6HRS PRAVILA Bonilla M.D.       • Pharmacy Consult Request ...Pain Management Review   Other SALMA Bonilla M.D.        And   • oxyCODONE immediate-release (ROXICODONE) tablet 5 mg  5 mg Oral Q3HRS PRAVILA Bonilla M.D.        And   • oxyCODONE immediate release (ROXICODONE) tablet 10 mg  10 mg Oral Q3HRS PRAVILA Bonilla M.D.        And   • morphine (pf) 4 mg/ml injection 4 mg  4 mg Intravenous Q3HRS PRAVILA Bonilla M.D.   4 mg at 06/26/18 0924   • labetalol (NORMODYNE,TRANDATE) injection 10 mg  10 mg Intravenous Q4HRS PRAVILA Bonilla M.D.            Social History     Social History   • Marital status:      Spouse name: N/A   • Number of children: N/A   • Years of education: N/A     Occupational History   • Not on file.     Social History Main Topics   • Smoking status: Former Smoker     Types: Cigars     Quit date: 6/5/2018   • Smokeless tobacco: Never Used   • Alcohol use Yes      Comment: 1 drink/day   • Drug use: No   • Sexual activity: Not on file     Other Topics Concern   • Not on file     Social History Narrative   • No narrative on file       History reviewed. No pertinent family history.    Allergies:  Patient has no known allergies.    Review of Systems:  Constitutional: Negative for fever, chills, weight loss, malaise/fatigue and diaphoresis.   HENT: Negative for hearing loss, ear pain, nosebleeds, congestion, sore throat, neck pain, tinnitus and ear discharge.    Eyes: Negative for blurred vision, double vision, photophobia, pain, discharge and redness.   Respiratory: Negative for cough, hemoptysis, sputum production, shortness of breath, wheezing and stridor.    Cardiovascular: Negative for chest pain, palpitations, orthopnea, claudication, leg swelling and PND.   Gastrointestinal: as above  Genitourinary: Negative for dysuria, urgency, frequency, hematuria and flank pain.   Musculoskeletal: Negative for myalgias, back pain, joint pain and falls.   Skin: Negative for itching and rash.  Neurological: Negative for dizziness, tingling, tremors, sensory change, speech change, focal weakness, seizures, loss of consciousness, weakness and headaches.   Endo/Heme/Allergies: Negative for environmental allergies and polydipsia. Does not bruise/bleed easily.   Psychiatric/Behavioral: Negative for depression, suicidal ideas, hallucinations, memory loss and substance abuse. The patient is not nervous/anxious and does not have insomnia.    Physical Exam:  Blood pressure 140/88, pulse 68, temperature 36.3 °C (97.4 °F), resp. rate 18, height 1.854 m  "(6' 1\"), weight 62.6 kg (138 lb 0.1 oz), SpO2 99 %.    Constitutional: he is oriented to person, place, and time.  he appears well-developed and well-nourished. No distress.   Head: Normocephalic and atraumatic. NGT in place  Neck: Normal range of motion. Neck supple. No JVD present. No tracheal deviation present. No thyromegaly present.   Cardiovascular: Normal rate, regular rhythm, normal heart sounds and intact distal pulses.  Exam reveals no gallop and no friction rub.  No murmur heard.  Pulmonary/Chest: Effort normal and breath sounds normal. No stridor. No respiratory distress. he has no wheezes. She has no rales.   Abdominal: Soft. Bowel sounds are normal. he exhibits mild distension and no mass. There is  Tenderness in periumbilical area. There is no rebound and no guarding. Ileal conduit in place.  Musculoskeletal: Normal range of motion. he exhibits no edema and no tenderness.   Neurological: he is alert and oriented to person, place, and time. he has normal reflexes. No cranial nerve deficit. Coordination normal.   Skin: Skin is warm and dry. No rash noted. he is not diaphoretic. No erythema. No pallor.   Psychiatric: he has a normal mood and affect.  Behavior is normal.       Labs:  Recent Labs      06/26/18   0255   WBC  10.2   RBC  3.62*   HEMOGLOBIN  11.0*   HEMATOCRIT  33.4*   MCV  92.3   MCH  30.4   MCHC  32.9*   RDW  52.2*   PLATELETCT  184   MPV  9.9     Recent Labs      06/26/18   0255   SODIUM  135   POTASSIUM  3.8   CHLORIDE  106   CO2  20   GLUCOSE  156*   BUN  30*   CREATININE  1.12   CALCIUM  8.6         Recent Labs      06/26/18   0255   ASTSGOT  25   ALTSGPT  42   TBILIRUBIN  0.4   ALKPHOSPHAT  62   GLOBULIN  2.9       Radiology:  CT from outside facility shows evidence of bowel obsruction.     Assessment: This is a 83 y.o. With recurrent/persistent bowel obstruction.     Plan: patient is admitted, continue NPO, IVF, NGT for decompression. Likely SBFT study today or tomorrow. Surgery if he " doesn't improve. Will follow    Thank you very much for this consultation.    Emanuel Bobby M.D.  [facility]  [phone]

## 2018-06-26 NOTE — ED NOTES
Juan fall assessment completed. Pt is High risk for fall. Interventions complete. Pt placed in yellow non slip socks, wrist band placed, green sign on door. Bed locked in low position, call light in place. Personal possessions in place.  Personal needs assessed. Safety assessed. Will monitor frequently

## 2018-06-26 NOTE — ED NOTES
Phlebotomist called for lab draw. Unable to obtain blood from pt's IV and pt w/ difficult IV access.

## 2018-06-26 NOTE — ED TRIAGE NOTES
Pt presents to the ED via EMS w/ abdominal pain. Pt coming from [place] w/ SBO. Pt w/ NG tube to R nare w/ yellow drainage. Pt has been having abd pain and n/v since 6/13. Pt was told he had a SBO confirmed via CT. Pt was to be admitted but [facility]. [facility]. Pt went to [place] last night w/ worsening ad pain and n/v. Denies diarrhea, fevers, CP, or SOB. Pt w/ hx of bladder ca and prostate ca. Pt w/ urostomy to RLQ since February. Pt w/ pacemaker, hx of mitral valve prolapse. A/Ox3. Respirations even and unlabored. JM. In NAD.

## 2018-06-27 ENCOUNTER — APPOINTMENT (OUTPATIENT)
Dept: RADIOLOGY | Facility: MEDICAL CENTER | Age: 83
DRG: 329 | End: 2018-06-27
Attending: HOSPITALIST
Payer: MEDICARE

## 2018-06-27 LAB
ANION GAP SERPL CALC-SCNC: 9 MMOL/L (ref 0–11.9)
BUN SERPL-MCNC: 32 MG/DL (ref 8–22)
CALCIUM SERPL-MCNC: 8.4 MG/DL (ref 8.5–10.5)
CHLORIDE SERPL-SCNC: 109 MMOL/L (ref 96–112)
CO2 SERPL-SCNC: 21 MMOL/L (ref 20–33)
CREAT SERPL-MCNC: 1.23 MG/DL (ref 0.5–1.4)
ERYTHROCYTE [DISTWIDTH] IN BLOOD BY AUTOMATED COUNT: 55.8 FL (ref 35.9–50)
GLUCOSE SERPL-MCNC: 104 MG/DL (ref 65–99)
HCT VFR BLD AUTO: 33.6 % (ref 42–52)
HGB BLD-MCNC: 10.7 G/DL (ref 14–18)
MCH RBC QN AUTO: 30.3 PG (ref 27–33)
MCHC RBC AUTO-ENTMCNC: 31.8 G/DL (ref 33.7–35.3)
MCV RBC AUTO: 95.2 FL (ref 81.4–97.8)
PLATELET # BLD AUTO: 206 K/UL (ref 164–446)
PMV BLD AUTO: 9.1 FL (ref 9–12.9)
POTASSIUM SERPL-SCNC: 4.1 MMOL/L (ref 3.6–5.5)
RBC # BLD AUTO: 3.53 M/UL (ref 4.7–6.1)
SODIUM SERPL-SCNC: 139 MMOL/L (ref 135–145)
WBC # BLD AUTO: 8.1 K/UL (ref 4.8–10.8)

## 2018-06-27 PROCEDURE — 700117 HCHG RX CONTRAST REV CODE 255: Performed by: HOSPITALIST

## 2018-06-27 PROCEDURE — 700111 HCHG RX REV CODE 636 W/ 250 OVERRIDE (IP): Performed by: HOSPITALIST

## 2018-06-27 PROCEDURE — 80048 BASIC METABOLIC PNL TOTAL CA: CPT

## 2018-06-27 PROCEDURE — 36415 COLL VENOUS BLD VENIPUNCTURE: CPT

## 2018-06-27 PROCEDURE — 700105 HCHG RX REV CODE 258: Performed by: HOSPITALIST

## 2018-06-27 PROCEDURE — 99232 SBSQ HOSP IP/OBS MODERATE 35: CPT | Performed by: HOSPITALIST

## 2018-06-27 PROCEDURE — 85027 COMPLETE CBC AUTOMATED: CPT

## 2018-06-27 PROCEDURE — 770006 HCHG ROOM/CARE - MED/SURG/GYN SEMI*

## 2018-06-27 RX ORDER — HEPARIN SODIUM 5000 [USP'U]/ML
5000 INJECTION, SOLUTION INTRAVENOUS; SUBCUTANEOUS EVERY 8 HOURS
Status: DISCONTINUED | OUTPATIENT
Start: 2018-06-27 | End: 2018-06-29

## 2018-06-27 RX ADMIN — SODIUM CHLORIDE: 9 INJECTION, SOLUTION INTRAVENOUS at 18:20

## 2018-06-27 RX ADMIN — ONDANSETRON 4 MG: 2 INJECTION INTRAMUSCULAR; INTRAVENOUS at 21:10

## 2018-06-27 RX ADMIN — MORPHINE SULFATE 4 MG: 4 INJECTION INTRAVENOUS at 21:10

## 2018-06-27 RX ADMIN — HEPARIN SODIUM 5000 UNITS: 5000 INJECTION, SOLUTION INTRAVENOUS; SUBCUTANEOUS at 21:10

## 2018-06-27 RX ADMIN — MORPHINE SULFATE 4 MG: 4 INJECTION INTRAVENOUS at 09:33

## 2018-06-27 RX ADMIN — SODIUM CHLORIDE: 9 INJECTION, SOLUTION INTRAVENOUS at 05:27

## 2018-06-27 RX ADMIN — HEPARIN SODIUM 5000 UNITS: 5000 INJECTION, SOLUTION INTRAVENOUS; SUBCUTANEOUS at 18:19

## 2018-06-27 RX ADMIN — SODIUM CHLORIDE: 9 INJECTION, SOLUTION INTRAVENOUS at 13:35

## 2018-06-27 RX ADMIN — MORPHINE SULFATE 4 MG: 4 INJECTION INTRAVENOUS at 15:27

## 2018-06-27 RX ADMIN — IOHEXOL 240 ML: 350 INJECTION, SOLUTION INTRAVENOUS at 14:30

## 2018-06-27 ASSESSMENT — ENCOUNTER SYMPTOMS
COUGH: 0
NAUSEA: 1
VOMITING: 0
HEADACHES: 0
SHORTNESS OF BREATH: 0
ABDOMINAL PAIN: 1
CHILLS: 0
DIARRHEA: 0
CONSTIPATION: 0
FEVER: 0
WHEEZING: 0

## 2018-06-27 ASSESSMENT — PAIN SCALES - GENERAL
PAINLEVEL_OUTOF10: 7
PAINLEVEL_OUTOF10: 6
PAINLEVEL_OUTOF10: 5
PAINLEVEL_OUTOF10: 5

## 2018-06-27 NOTE — DISCHARGE PLANNING
Agency/Facility Name: [facility]   Spoke To: Odalys   Outcome: pt declined: facility does not service area     [facility] sent referral to [facility]   Referral sent per Choice form @ [phone].   CCA will follow up to see if they service [place], [place]     Based on referral status CCA will send referrals to [facility] and [facility] .

## 2018-06-27 NOTE — PROGRESS NOTES
Bedside report received.    Assessment complete.  A&O x 4. Patient calls appropriately.  Patient ambiualtes with standby assist.   Patient has 7/10 pain. Medicated per Mar.  Reports some intermittent nausea. Medicated per Mar. NG tube adjusted.  + void (urostomy), - flatus  Patient denies SOB.    Review plan with of care with patient. Call light and personal belongings with in reach. Hourly rounding in place. All needs met at this time.

## 2018-06-27 NOTE — PROGRESS NOTES
Admitted today for SBO  Checking urinalysis  Still having abdominal pain and nausea  Surgeon Dr. Bobby consulted  Considering possible SBFT and/or surgery if needed  Will likely benefit from home health

## 2018-06-27 NOTE — CARE PLAN
Problem: Safety  Goal: Will remain free from falls  Outcome: PROGRESSING AS EXPECTED  Patient calls appropriately for assistance. Room kept clutter and spill free.     Problem: Pain Management  Goal: Pain level will decrease to patient's comfort goal  Outcome: PROGRESSING AS EXPECTED  Pain is at a comfortable level for pain at this time, will continue to assess throughout shift.

## 2018-06-27 NOTE — DIETARY
"Nutrition services: Day 1 of admit.  Florian Trevizo is a 83 y.o. male with admitting DX of SBO  -Consult received for Poor PO/ Wt Loss noted on nutrition admit screen + Low BMI    Met with pt this date to assess nutrition status. Pt reports he ate well at home for 2 days, but [facility]PO for over a week and only had clears/fulls x 1 day prior to his discharge home. He states he may have been on TPN for a few days during that admission, but he is unsure. Discussed with RN who is working on gathering information from pt's [facility] stay. Pt also states he was unable to eat much for a couple weeks prior to his stay [facility] the [facility], indicating inadequate nutrition x 1 month. Pt reports he weighed 160# 2 weeks ago. Pt appears thin, did not present with any apparent signs of muscle wasting though pt was covered by many blankets and full nutrition-focused physical exam unobtainable at this time.     Assessment:  Height: 185.4 cm (6' 1\"), Weight: 62.6 kg (138 lb)  Body mass index is 18.21 kg/m². (Underweight Classification)  Diet/Intake: NPO x 1 (Note pt only ate at home x 2 days and [facility] prior, per pt interview)    Evaluation:   1. Pt has experiences Severe 22#(14%) unintentional weight loss in the past 2 weeks.   2. Pt with NG tube to low continuous suction.  3. Fluids: NS @ 125 mL/hr    Malnutrition Risk: Pt presents with severe malnutrition in the context of acute illness re;ated to Small Bowel Obstruction as evidence by severe 14% unintentional weight loss over the past 2 weeks and <50% intake of estimated energy requirements for at least 2 weeks, per pt interview    Recommendations/Plan:  1. Advance diet as tolerated when medically feasible  2. Per pt with inadequate nutrition x 10+ days and severe unintentional weight loss, consider starting nutrition support via TPN as enteral nutrition is not indicated 2' to GI status.     RD " following for nutritional plan of care and to make recommendations as appropriate.

## 2018-06-27 NOTE — PROGRESS NOTES
"Surgical Progress Note:      No flatus or BM  Some pain    PE:  /63   Pulse 93   Temp 37.2 °C (98.9 °F)   Resp 16   Ht 1.854 m (6' 1\")   Wt 62.6 kg (138 lb 0.1 oz)   SpO2 98%   BMI 18.21 kg/m²     I/O:   Intake/Output Summary (Last 24 hours) at 06/27/18 1211  Last data filed at 06/27/18 0730   Gross per 24 hour   Intake             1500 ml   Output             1700 ml   Net             -200 ml     UOP:  PO:  IV:    GEN: NAd  COR: RRR  PULM: CTA  ABD: soft, mild distention, mild TTP      Labs:  Recent Labs      06/26/18   0255  06/27/18   0609   WBC  10.2  8.1   RBC  3.62*  3.53*   HEMOGLOBIN  11.0*  10.7*   HEMATOCRIT  33.4*  33.6*   MCV  92.3  95.2   MCH  30.4  30.3   RDW  52.2*  55.8*   PLATELETCT  184  206   MPV  9.9  9.1   NEUTSPOLYS  86.30*   --    LYMPHOCYTES  4.70*   --    MONOCYTES  6.50   --    EOSINOPHILS  0.10   --    BASOPHILS  0.40   --      Recent Labs      06/26/18   0255  06/27/18   0609   SODIUM  135  139   POTASSIUM  3.8  4.1   CHLORIDE  106  109   CO2  20  21   GLUCOSE  156*  104*   BUN  30*  32*         A/P:   SBO  No improvement  Will check SBFT today  Possible ex lap depending on findings     Emanuel Bobby M.D.  [facility]  [phone]    "

## 2018-06-27 NOTE — FACE TO FACE
Face to Face Supporting Documentation - Home Health    The encounter with this patient was in whole or in part the primary reason for home health admission.    Date of encounter:   Patient:                    MRN:                       YOB: 2016  Florian Trevizo  [id number]  1935     Home health to see patient for:  Skilled Nursing care for assessment, interventions & education    Skilled need for:  New Onset Medical Diagnosis SBO    Skilled nursing interventions to include:  Comment: chk onpt    Homebound status evidenced by:  Needs the assistance of another person in order to leave the home. Leaving home requires a considerable and taxing effort. There is a normal inability to leave the home.    Community Physician to provide follow up care: Pcp Unknown     Optional Interventions? No      I certify the face to face encounter for this home health care referral meets the CMS requirements and the encounter/clinical assessment with the patient was, in whole, or in part, for the medical condition(s) listed above, which is the primary reason for home health care. Based on my clinical findings: the service(s) are medically necessary, support the need for home health care, and the homebound criteria are met.  I certify that this patient has had a face to face encounter by myself.  Hermes Temple M.D. - NPI: [id number]

## 2018-06-27 NOTE — DISCHARGE PLANNING
Received Choice form at 1027  Agency/Facility Name: [facility]   Referral sent per Choice form @ [phone]

## 2018-06-27 NOTE — PROGRESS NOTES
Received report from day shift RN. Assumed patient care  AOx4  Pain rated at 4/10, declines pharmacologic intervention at this time  Strict NPO  NG tube to left nare to low continuous suction, confirmed placement with xray  Ambulates SBA   +void through urostomy, -flatus  POC discussed with patient. Call light within reach. All needs met at this time.

## 2018-06-27 NOTE — DISCHARGE PLANNING
Agency[facility]Facility Name: [facility]   Spoke To: Rossana   Outcome: declined: not in service area     [facility] has sent referral to Alexandrea  @[phone] and will follow up on referral status.     Pt lives in [place], [place]  And will need home health.

## 2018-06-28 PROBLEM — E43 SEVERE PROTEIN-CALORIE MALNUTRITION (HCC): Status: ACTIVE | Noted: 2018-06-28

## 2018-06-28 LAB
ALBUMIN SERPL BCP-MCNC: 3.2 G/DL (ref 3.2–4.9)
BUN SERPL-MCNC: 34 MG/DL (ref 8–22)
CALCIUM SERPL-MCNC: 8.8 MG/DL (ref 8.5–10.5)
CHLORIDE SERPL-SCNC: 111 MMOL/L (ref 96–112)
CO2 SERPL-SCNC: 23 MMOL/L (ref 20–33)
CREAT SERPL-MCNC: 1.69 MG/DL (ref 0.5–1.4)
ERYTHROCYTE [DISTWIDTH] IN BLOOD BY AUTOMATED COUNT: 58 FL (ref 35.9–50)
GLUCOSE SERPL-MCNC: 94 MG/DL (ref 65–99)
HCT VFR BLD AUTO: 35.2 % (ref 42–52)
HGB BLD-MCNC: 11.1 G/DL (ref 14–18)
MCH RBC QN AUTO: 30.2 PG (ref 27–33)
MCHC RBC AUTO-ENTMCNC: 31.5 G/DL (ref 33.7–35.3)
MCV RBC AUTO: 95.9 FL (ref 81.4–97.8)
PHOSPHATE SERPL-MCNC: 4.2 MG/DL (ref 2.5–4.5)
PLATELET # BLD AUTO: 234 K/UL (ref 164–446)
PMV BLD AUTO: 9.4 FL (ref 9–12.9)
POTASSIUM SERPL-SCNC: 3.7 MMOL/L (ref 3.6–5.5)
RBC # BLD AUTO: 3.67 M/UL (ref 4.7–6.1)
SODIUM SERPL-SCNC: 145 MMOL/L (ref 135–145)
WBC # BLD AUTO: 7.6 K/UL (ref 4.8–10.8)

## 2018-06-28 PROCEDURE — 700111 HCHG RX REV CODE 636 W/ 250 OVERRIDE (IP): Performed by: HOSPITALIST

## 2018-06-28 PROCEDURE — 700105 HCHG RX REV CODE 258: Performed by: HOSPITALIST

## 2018-06-28 PROCEDURE — 74250 X-RAY XM SM INT 1CNTRST STD: CPT

## 2018-06-28 PROCEDURE — 80069 RENAL FUNCTION PANEL: CPT

## 2018-06-28 PROCEDURE — 85027 COMPLETE CBC AUTOMATED: CPT

## 2018-06-28 PROCEDURE — 770006 HCHG ROOM/CARE - MED/SURG/GYN SEMI*

## 2018-06-28 PROCEDURE — 36415 COLL VENOUS BLD VENIPUNCTURE: CPT

## 2018-06-28 PROCEDURE — 99232 SBSQ HOSP IP/OBS MODERATE 35: CPT | Performed by: HOSPITALIST

## 2018-06-28 RX ADMIN — MORPHINE SULFATE 4 MG: 4 INJECTION INTRAVENOUS at 21:19

## 2018-06-28 RX ADMIN — SODIUM CHLORIDE: 9 INJECTION, SOLUTION INTRAVENOUS at 05:16

## 2018-06-28 RX ADMIN — ONDANSETRON 4 MG: 2 INJECTION INTRAMUSCULAR; INTRAVENOUS at 02:20

## 2018-06-28 RX ADMIN — ONDANSETRON 4 MG: 2 INJECTION INTRAMUSCULAR; INTRAVENOUS at 18:12

## 2018-06-28 RX ADMIN — SODIUM CHLORIDE: 9 INJECTION, SOLUTION INTRAVENOUS at 12:55

## 2018-06-28 RX ADMIN — HEPARIN SODIUM 5000 UNITS: 5000 INJECTION, SOLUTION INTRAVENOUS; SUBCUTANEOUS at 05:15

## 2018-06-28 RX ADMIN — SODIUM CHLORIDE: 9 INJECTION, SOLUTION INTRAVENOUS at 20:12

## 2018-06-28 RX ADMIN — HEPARIN SODIUM 5000 UNITS: 5000 INJECTION, SOLUTION INTRAVENOUS; SUBCUTANEOUS at 15:16

## 2018-06-28 RX ADMIN — MORPHINE SULFATE 4 MG: 4 INJECTION INTRAVENOUS at 02:20

## 2018-06-28 RX ADMIN — HEPARIN SODIUM 5000 UNITS: 5000 INJECTION, SOLUTION INTRAVENOUS; SUBCUTANEOUS at 21:19

## 2018-06-28 ASSESSMENT — ENCOUNTER SYMPTOMS
CHILLS: 0
COUGH: 0
NAUSEA: 1
ABDOMINAL PAIN: 1
WHEEZING: 0
NERVOUS/ANXIOUS: 1
VOMITING: 0
SHORTNESS OF BREATH: 0
HEADACHES: 0
FEVER: 0

## 2018-06-28 ASSESSMENT — PAIN SCALES - GENERAL
PAINLEVEL_OUTOF10: 0
PAINLEVEL_OUTOF10: 6
PAINLEVEL_OUTOF10: 5

## 2018-06-28 NOTE — DISCHARGE PLANNING
Agency/Facility Name: [facility]   Spoke To: CCA left message for Harika   Outcome: CCA stated that PCP is Dr. Diana with [facility] and asked Harika to return CCA call.

## 2018-06-28 NOTE — PROGRESS NOTES
Received report from day shift RN. Assumed patient care  AOx4  Pain rated at 7/10, medicated per MAR  Patient reports nausea, medicated per MAR  NG tube in place to low continuous suction  NPO with ice chips  IV fluids running  Call light within reach  All needs met at this time.

## 2018-06-28 NOTE — DISCHARGE PLANNING
Agency/Facility Name: [facility]   Spoke To: Wanda   Outcome: pt declined after refusing service. Facility does not have a nurse to service ALICIA Collado.     [facility] will send home health referral to [facility] per CHARISSA Joya.

## 2018-06-28 NOTE — DISCHARGE PLANNING
Agency/Facility Name: [facility]   Spoke To: left message for intake rep Harika   Outcome: CCA asked for returned call on status of referral and If facility can service [facility].

## 2018-06-28 NOTE — DISCHARGE PLANNING
Agency/Facility Name: [facility]   Spoke To: Harika   Outcome: Harika has not verified [facility] PCP for medicare at this time. CCA has let Harika know that pt has not been m/c yet and will possibly discharge tomorrow 6/29 per Dr. Temple. Harika will give this CCA a call back once she has verified patients PCP.

## 2018-06-28 NOTE — PROGRESS NOTES
"Surgical Progress Note:      Had SBFT  No gas or BM    PE:  /68   Pulse 95   Temp 37.4 °C (99.3 °F)   Resp 17   Ht 1.854 m (6' 1\")   Wt 62.6 kg (138 lb 0.1 oz)   SpO2 92%   BMI 18.21 kg/m²     I/O:   Intake/Output Summary (Last 24 hours) at 06/28/18 1648  Last data filed at 06/28/18 1500   Gross per 24 hour   Intake              500 ml   Output             2550 ml   Net            -2050 ml     UOP:  PO:  IV:    GEN: NAD  COR: RRR  PULM: CTA  ABD: soft, NTND, NGT bilious      Labs:  Recent Labs      06/26/18   0255  06/27/18   0609  06/28/18   0417   WBC  10.2  8.1  7.6   RBC  3.62*  3.53*  3.67*   HEMOGLOBIN  11.0*  10.7*  11.1*   HEMATOCRIT  33.4*  33.6*  35.2*   MCV  92.3  95.2  95.9   MCH  30.4  30.3  30.2   RDW  52.2*  55.8*  58.0*   PLATELETCT  184  206  234   MPV  9.9  9.1  9.4   NEUTSPOLYS  86.30*   --    --    LYMPHOCYTES  4.70*   --    --    MONOCYTES  6.50   --    --    EOSINOPHILS  0.10   --    --    BASOPHILS  0.40   --    --      Recent Labs      06/26/18 0255  06/27/18   0609  06/28/18   0417   SODIUM  135  139  145   POTASSIUM  3.8  4.1  3.7   CHLORIDE  106  109  111   CO2  20  21  23   GLUCOSE  156*  104*  94   BUN  30*  32*  34*         A/P:  Small bowel obstruction'  SBFT images reviewed, no read yet  No passage of contrast to colon  Plan for OR tomorrow for laparotomy and lysis of adhesions     Emanuel Bobby M.D.  [facility]  [phone]    "

## 2018-06-28 NOTE — CARE PLAN
Problem: Venous Thromboembolism (VTW)/Deep Vein Thrombosis (DVT) Prevention:  Goal: Patient will participate in Venous Thrombosis (VTE)/Deep Vein Thrombosis (DVT)Prevention Measures  Outcome: PROGRESSING AS EXPECTED  Ensure SCDs are on while in bed, encourage ambulation    Problem: Bowel/Gastric:  Goal: Normal bowel function is maintained or improved  Outcome: PROGRESSING AS EXPECTED  Ensure NG tube output is adequate, ensure suction is functioning, assess for nausea.

## 2018-06-28 NOTE — DISCHARGE PLANNING
Agency/Facility Name: [facility]    Spoke To: CCA received fax   Outcome: patient declined home health facility

## 2018-06-28 NOTE — DISCHARGE PLANNING
Agency/Facility Name: [facility]   Spoke To: Harika   Outcome: Harika will find out if [facility] PCP is apart of [place] network for medicare requirements. CCA is waiting for call with follow up.     CHARISSA Joya has been notified.

## 2018-06-28 NOTE — CARE PLAN
Problem: Safety  Goal: Will remain free from falls  Outcome: PROGRESSING AS EXPECTED  Patient is free from falls, patient calls appropriately for assistance.     Problem: Pain Management  Goal: Pain level will decrease to patient's comfort goal  Outcome: PROGRESSING AS EXPECTED  Medicated patient's pain with IV morphine with good effect.

## 2018-06-28 NOTE — DOCUMENTATION QUERY
"DOCUMENTATION QUERY    PROVIDERS: Please select “Cosign w/ note”to reply to query.    To better represent the severity of illness of your patient, please review the following information and exercise your independent professional judgment in responding to this query.     BMI 18.21 and severe 14% weight loss are noted in the Dietary note. Based upon the clinical findings, risk factors, and treatment, can a diagnosis be provided to support this finding?    • Mild protein calorie malnutrition  · Mild protein calorie malnutrition  · Mild protein calorie malnutrition  • Other explanation of clinical findings  • Findings of no clinical significance  • Unable to determine      The medical record reflects the following:   Clinical Findings  Dietary note 6/27/2018    -Malnutrition Risk: Pt presents with severe malnutrition in the context of acute illness re;ated to Small Bowel Obstruction as evidence by severe 14% unintentional weight loss over the past 2 weeks and <50% intake of estimated energy requirements for at least 2 weeks, per pt interview    -Per pt with inadequate nutrition x 10+ days and severe unintentional weight loss, consider starting nutrition support via TPN as enteral nutrition is not indicated 2' to GI status.     -Poor PO/Wt loss & Low BMI  -Pt appears thin  -Severe 22lb (14%) weight loss in the past 2 weeks    Height 6'1\"  Weight 138lbs  BMI 18.21   Treatment  Dietary consult   Risk Factors  SBO, NG tube, recent hospitalization for SBO, NPO   Location within medical record  Progress Notes     Thank you,   Cherri Colmenares MSN, RN, CNL, CNML  Clinical   [email or web address]  [phone]            "

## 2018-06-28 NOTE — DISCHARGE PLANNING
Agency/Facility Name: [facility]   Spoke To: Harika   Outcome: Per Harika, facility does service [place], [place] facilty needs name of PCP and discharge summary.       CHARISSA Joya has been notified.

## 2018-06-29 ENCOUNTER — RESOLUTE PROFESSIONAL BILLING HOSPITAL PROF FEE (OUTPATIENT)
Dept: HOSPITALIST | Facility: MEDICAL CENTER | Age: 83
End: 2018-06-29
Payer: MEDICARE

## 2018-06-29 PROBLEM — E87.0 ACUTE HYPERNATREMIA: Status: ACTIVE | Noted: 2018-06-29

## 2018-06-29 LAB
ALBUMIN SERPL BCP-MCNC: 2.7 G/DL (ref 3.2–4.9)
BUN SERPL-MCNC: 34 MG/DL (ref 8–22)
CALCIUM SERPL-MCNC: 8.8 MG/DL (ref 8.5–10.5)
CHLORIDE SERPL-SCNC: 113 MMOL/L (ref 96–112)
CO2 SERPL-SCNC: 26 MMOL/L (ref 20–33)
CREAT SERPL-MCNC: 1.51 MG/DL (ref 0.5–1.4)
ERYTHROCYTE [DISTWIDTH] IN BLOOD BY AUTOMATED COUNT: 58.4 FL (ref 35.9–50)
GLUCOSE SERPL-MCNC: 94 MG/DL (ref 65–99)
HCT VFR BLD AUTO: 35.4 % (ref 42–52)
HCT VFR BLD CALC: 24 % (ref 42–52)
HGB BLD-MCNC: 11.1 G/DL (ref 14–18)
HGB BLD-MCNC: 8.2 G/DL (ref 14–18)
MCH RBC QN AUTO: 29.9 PG (ref 27–33)
MCHC RBC AUTO-ENTMCNC: 31.4 G/DL (ref 33.7–35.3)
MCV RBC AUTO: 95.4 FL (ref 81.4–97.8)
PHOSPHATE SERPL-MCNC: 3.7 MG/DL (ref 2.5–4.5)
PLATELET # BLD AUTO: 227 K/UL (ref 164–446)
PMV BLD AUTO: 9.4 FL (ref 9–12.9)
POTASSIUM BLD-SCNC: 3.5 MMOL/L (ref 3.6–5.5)
POTASSIUM SERPL-SCNC: 3.4 MMOL/L (ref 3.6–5.5)
RBC # BLD AUTO: 3.71 M/UL (ref 4.7–6.1)
SODIUM BLD-SCNC: 147 MMOL/L (ref 135–145)
SODIUM SERPL-SCNC: 149 MMOL/L (ref 135–145)
WBC # BLD AUTO: 4.7 K/UL (ref 4.8–10.8)

## 2018-06-29 PROCEDURE — 500122 HCHG BOVIE, BLADE: Performed by: SURGERY

## 2018-06-29 PROCEDURE — 0DN80ZZ RELEASE SMALL INTESTINE, OPEN APPROACH: ICD-10-PCS | Performed by: SURGERY

## 2018-06-29 PROCEDURE — 700101 HCHG RX REV CODE 250

## 2018-06-29 PROCEDURE — 700111 HCHG RX REV CODE 636 W/ 250 OVERRIDE (IP)

## 2018-06-29 PROCEDURE — 160009 HCHG ANES TIME/MIN: Performed by: SURGERY

## 2018-06-29 PROCEDURE — 700111 HCHG RX REV CODE 636 W/ 250 OVERRIDE (IP): Performed by: HOSPITALIST

## 2018-06-29 PROCEDURE — 770022 HCHG ROOM/CARE - ICU (200)

## 2018-06-29 PROCEDURE — 700105 HCHG RX REV CODE 258: Performed by: HOSPITALIST

## 2018-06-29 PROCEDURE — P9045 ALBUMIN (HUMAN), 5%, 250 ML: HCPCS | Mod: JG

## 2018-06-29 PROCEDURE — 160035 HCHG PACU - 1ST 60 MINS PHASE I: Performed by: SURGERY

## 2018-06-29 PROCEDURE — 160036 HCHG PACU - EA ADDL 30 MINS PHASE I: Performed by: SURGERY

## 2018-06-29 PROCEDURE — 501435 HCHG STAPLER, LINEAR 60: Performed by: SURGERY

## 2018-06-29 PROCEDURE — 160041 HCHG SURGERY MINUTES - EA ADDL 1 MIN LEVEL 4: Performed by: SURGERY

## 2018-06-29 PROCEDURE — 501838 HCHG SUTURE GENERAL: Performed by: SURGERY

## 2018-06-29 PROCEDURE — 160002 HCHG RECOVERY MINUTES (STAT): Performed by: SURGERY

## 2018-06-29 PROCEDURE — 502704 HCHG DEVICE, LIGASURE IMPACT: Performed by: SURGERY

## 2018-06-29 PROCEDURE — 85027 COMPLETE CBC AUTOMATED: CPT

## 2018-06-29 PROCEDURE — 84295 ASSAY OF SERUM SODIUM: CPT

## 2018-06-29 PROCEDURE — 0DT80ZZ RESECTION OF SMALL INTESTINE, OPEN APPROACH: ICD-10-PCS | Performed by: SURGERY

## 2018-06-29 PROCEDURE — 160029 HCHG SURGERY MINUTES - 1ST 30 MINS LEVEL 4: Performed by: SURGERY

## 2018-06-29 PROCEDURE — 160048 HCHG OR STATISTICAL LEVEL 1-5: Performed by: SURGERY

## 2018-06-29 PROCEDURE — 700101 HCHG RX REV CODE 250: Performed by: SURGERY

## 2018-06-29 PROCEDURE — 80069 RENAL FUNCTION PANEL: CPT

## 2018-06-29 PROCEDURE — 501452 HCHG STAPLES, GIA MULTIFIRE 60/80: Performed by: SURGERY

## 2018-06-29 PROCEDURE — 85014 HEMATOCRIT: CPT

## 2018-06-29 PROCEDURE — 501460 HCHG STAPLER, TA55 35LD: Performed by: SURGERY

## 2018-06-29 PROCEDURE — 88307 TISSUE EXAM BY PATHOLOGIST: CPT

## 2018-06-29 PROCEDURE — 501445 HCHG STAPLER, SKIN DISP: Performed by: SURGERY

## 2018-06-29 PROCEDURE — 99232 SBSQ HOSP IP/OBS MODERATE 35: CPT | Performed by: HOSPITALIST

## 2018-06-29 PROCEDURE — 700111 HCHG RX REV CODE 636 W/ 250 OVERRIDE (IP): Mod: JG

## 2018-06-29 PROCEDURE — 500697 HCHG HEMOCLIP, LARGE (ORANGE): Performed by: SURGERY

## 2018-06-29 PROCEDURE — 501433 HCHG STAPLER, GIA MULTIFIRE 60/80: Performed by: SURGERY

## 2018-06-29 PROCEDURE — 84132 ASSAY OF SERUM POTASSIUM: CPT

## 2018-06-29 PROCEDURE — 500424 HCHG DRESSING, AIRSTRIP: Performed by: SURGERY

## 2018-06-29 PROCEDURE — 500698 HCHG HEMOCLIP, MEDIUM: Performed by: SURGERY

## 2018-06-29 RX ORDER — MAGNESIUM HYDROXIDE 1200 MG/15ML
LIQUID ORAL
Status: COMPLETED | OUTPATIENT
Start: 2018-06-29 | End: 2018-06-29

## 2018-06-29 RX ORDER — DEXTROSE MONOHYDRATE, SODIUM CHLORIDE, AND POTASSIUM CHLORIDE 50; 1.49; 9 G/1000ML; G/1000ML; G/1000ML
INJECTION, SOLUTION INTRAVENOUS CONTINUOUS
Status: DISCONTINUED | OUTPATIENT
Start: 2018-06-30 | End: 2018-06-30

## 2018-06-29 RX ORDER — MORPHINE SULFATE 4 MG/ML
4 INJECTION, SOLUTION INTRAMUSCULAR; INTRAVENOUS
Status: DISCONTINUED | OUTPATIENT
Start: 2018-06-29 | End: 2018-07-14

## 2018-06-29 RX ORDER — DEXTROSE MONOHYDRATE 50 MG/ML
INJECTION, SOLUTION INTRAVENOUS CONTINUOUS
Status: DISCONTINUED | OUTPATIENT
Start: 2018-06-29 | End: 2018-06-29

## 2018-06-29 RX ORDER — POTASSIUM CHLORIDE 7.45 MG/ML
10 INJECTION INTRAVENOUS
Status: COMPLETED | OUTPATIENT
Start: 2018-06-29 | End: 2018-06-29

## 2018-06-29 RX ORDER — HEPARIN SODIUM 5000 [USP'U]/ML
5000 INJECTION, SOLUTION INTRAVENOUS; SUBCUTANEOUS EVERY 8 HOURS
Status: DISCONTINUED | OUTPATIENT
Start: 2018-06-30 | End: 2018-06-30

## 2018-06-29 RX ORDER — ONDANSETRON 2 MG/ML
4 INJECTION INTRAMUSCULAR; INTRAVENOUS EVERY 4 HOURS PRN
Status: DISCONTINUED | OUTPATIENT
Start: 2018-06-29 | End: 2018-07-18 | Stop reason: HOSPADM

## 2018-06-29 RX ADMIN — POTASSIUM CHLORIDE, DEXTROSE MONOHYDRATE AND SODIUM CHLORIDE: 150; 5; 900 INJECTION, SOLUTION INTRAVENOUS at 23:37

## 2018-06-29 RX ADMIN — MORPHINE SULFATE 4 MG: 4 INJECTION INTRAVENOUS at 23:42

## 2018-06-29 RX ADMIN — POTASSIUM CHLORIDE 10 MEQ: 10 INJECTION, SOLUTION INTRAVENOUS at 12:41

## 2018-06-29 RX ADMIN — POTASSIUM CHLORIDE 10 MEQ: 10 INJECTION, SOLUTION INTRAVENOUS at 09:42

## 2018-06-29 RX ADMIN — POTASSIUM CHLORIDE 10 MEQ: 10 INJECTION, SOLUTION INTRAVENOUS at 11:41

## 2018-06-29 RX ADMIN — FENTANYL CITRATE 50 MCG: 50 INJECTION, SOLUTION INTRAMUSCULAR; INTRAVENOUS at 23:25

## 2018-06-29 RX ADMIN — FENTANYL CITRATE 50 MCG: 50 INJECTION, SOLUTION INTRAMUSCULAR; INTRAVENOUS at 23:00

## 2018-06-29 RX ADMIN — SODIUM CHLORIDE: 9 INJECTION, SOLUTION INTRAVENOUS at 04:55

## 2018-06-29 RX ADMIN — POTASSIUM CHLORIDE 10 MEQ: 10 INJECTION, SOLUTION INTRAVENOUS at 10:48

## 2018-06-29 RX ADMIN — DEXTROSE MONOHYDRATE: 50 INJECTION, SOLUTION INTRAVENOUS at 09:42

## 2018-06-29 ASSESSMENT — ENCOUNTER SYMPTOMS
COUGH: 0
CHILLS: 0
CONSTIPATION: 1
WHEEZING: 0
VOMITING: 0
FEVER: 0
NERVOUS/ANXIOUS: 1
SHORTNESS OF BREATH: 0
NAUSEA: 1
DIARRHEA: 0
HEADACHES: 0
ABDOMINAL PAIN: 1

## 2018-06-29 ASSESSMENT — PAIN SCALES - GENERAL
PAINLEVEL_OUTOF10: 10
PAINLEVEL_OUTOF10: 5
PAINLEVEL_OUTOF10: 8
PAINLEVEL_OUTOF10: 0
PAINLEVEL_OUTOF10: 0

## 2018-06-29 NOTE — DISCHARGE PLANNING
Agency/Facility Name: [facility]   Spoke To: CCA left voicemail for Harika   Outcome: CCA inquired about status on verifying patients PCP.

## 2018-06-29 NOTE — DISCHARGE PLANNING
Anticipated Discharge Disposition: Home with [facility].    Action: [facility] reports, [facility] has accepted patient.  Dr. Mariana Byrne with the [facility] will oversee patient for [facility].  Per [facility], the discharge summary will need to be fax to [phone] on the day of discharge.    Barriers to Discharge: None.    Plan: Home with [facility], pending medical clearance.

## 2018-06-29 NOTE — ASSESSMENT & PLAN NOTE
-off TPN for 2 days, but oral intake remains somewhat low, primarily because patient does not like the food here  -encourage oral intake  -TPN for home, 50/50?  -need to touch base with surgery

## 2018-06-29 NOTE — CARE PLAN
Problem: Bowel/Gastric:  Goal: Normal bowel function is maintained or improved  Outcome: PROGRESSING AS EXPECTED  Patient has BMs x2 this AM.      Problem: Respiratory:  Goal: Respiratory status will improve  Outcome: PROGRESSING AS EXPECTED  Patient requiring 1L O2.  IS instructions and education provided.  Patient demonstrated understanding.

## 2018-06-29 NOTE — CARE PLAN
Problem: Nutritional:  Goal: Achieve adequate nutritional intake  Patient will advance diet or start Nutrition Support   Outcome: NOT MET  Pt remains NPO now x 3 days; and continues w/ NG to suction, observed at time of visit. As soon as medically feasible advance diet as tolerated. RD to monitor diet advancement vs need for nutrition support.

## 2018-06-29 NOTE — CARE PLAN
Problem: Communication  Goal: The ability to communicate needs accurately and effectively will improve  Outcome: PROGRESSING AS EXPECTED  Patient able to make his needs known to staff. Calls appropriately for assistance     Problem: Pain Management  Goal: Pain level will decrease to patient's comfort goal  Outcome: PROGRESSING AS EXPECTED  Medicated patient with IV morphine with good effect.

## 2018-06-29 NOTE — PROGRESS NOTES
[facility] Hospitalist Progress Note    Date of Service: 2018    Chief Complaint  83 y.o. male admitted 2018 with SBO    Interval Problem Update  : Still not passing much gas. Lactic acidosis resolved. Ordered home health but declined by several agencies. SBFT now pending  : SBFT shows incomplete small bowel obstruction. Surgeon planning lysis of adhesions tomorrow. Creatinine increased to 1.69    Disposition  Pending improvement of SBO  Ordered home health but rejected by agencies so patient will not be able to have home health        Review of Systems   Constitutional: Negative for chills and fever.   Respiratory: Negative for cough, shortness of breath and wheezing.    Cardiovascular: Negative for chest pain.   Gastrointestinal: Positive for abdominal pain and nausea. Negative for vomiting.   Genitourinary: Negative for dysuria.   Neurological: Negative for headaches.   Psychiatric/Behavioral: The patient is nervous/anxious.       Physical Exam  Laboratory/Imaging   Hemodynamics  Temp (24hrs), Av.3 °C (99.2 °F), Min:37.1 °C (98.8 °F), Max:37.4 °C (99.4 °F)   Temperature: 37.3 °C (99.2 °F)  Pulse  Av.3  Min: 46  Max: 103   Blood Pressure : 134/76      Respiratory      Respiration: 17, Pulse Oximetry: 95 %        RUL Breath Sounds: Clear, RML Breath Sounds: Diminished, RLL Breath Sounds: Diminished, LEXA Breath Sounds: Clear, LLL Breath Sounds: Diminished    Fluids    Intake/Output Summary (Last 24 hours) at 18 1747  Last data filed at 18 1615   Gross per 24 hour   Intake              500 ml   Output             2750 ml   Net            -2250 ml       Nutrition  Orders Placed This Encounter   Procedures   • Diet NPO     Standing Status:   Standing     Number of Occurrences:   1     Order Specific Question:   Restrict to:     Answer:   Ice Chips [2]     Physical Exam   Constitutional: He appears well-developed.   HENT:   Head: Normocephalic.   Cardiovascular: Normal rate.     Pulmonary/Chest: No respiratory distress. He has no wheezes. He has no rales.   Abdominal: He exhibits distension. There is tenderness. There is no rebound.   Neurological: He is alert.       Recent Labs      06/26/18 0255 06/27/18 0609 06/28/18 0417   WBC  10.2  8.1  7.6   RBC  3.62*  3.53*  3.67*   HEMOGLOBIN  11.0*  10.7*  11.1*   HEMATOCRIT  33.4*  33.6*  35.2*   MCV  92.3  95.2  95.9   MCH  30.4  30.3  30.2   MCHC  32.9*  31.8*  31.5*   RDW  52.2*  55.8*  58.0*   PLATELETCT  184  206  234   MPV  9.9  9.1  9.4     Recent Labs      06/26/18 0255 06/27/18 0609 06/28/18 0417   SODIUM  135  139  145   POTASSIUM  3.8  4.1  3.7   CHLORIDE  106  109  111   CO2  20  21  23   GLUCOSE  156*  104*  94   BUN  30*  32*  34*   CREATININE  1.12  1.23  1.69*   CALCIUM  8.6  8.4*  8.8                      Assessment/Plan     * SBO (small bowel obstruction) ([facility])   Assessment & Plan    Initially trying conservative management with NPO  IV fluid hydration and symptomatic management for pain and nausea.   Dr. Gardner of surgery was consulted  NG tube to intermittent low wall suction.  SBFT confirms incomplete small bowel obstruction  Lysis of adhesions pending        Severe protein-calorie malnutrition (HCC)- (present on admission)   Assessment & Plan    severe 14% weight loss         T2DM (type 2 diabetes mellitus) ([facility])   Assessment & Plan    No long-acting insulin while nothing by mouth, monitor with Accu-Cheks and cover with insulin sliding scale.        HLD (hyperlipidemia)   Assessment & Plan    Holding home statin for now while he is nothing by mouth. Resume once cleared by surgery for by mouth intake.        HTN (hypertension)   Assessment & Plan    Holding home antihypertensives for now while he is nothing by mouth, cover with when necessary IV antihypertensives.          Quality-Core Measures   Reviewed items::  Labs reviewed and Medications reviewed  Carter catheter::  No Carter  DVT prophylaxis  pharmacological::  Heparin

## 2018-06-29 NOTE — PROGRESS NOTES
Received report from day shift RN. Assumed patient care  AOx4  Abdominal pain rated 5/10, declines intervention at this time  2 L O2  NG tube to low continuous suction, output is green/brown  Up 1 person assist  NPO with ice chips   Surgery scheduled 6/29/18 at 1415  IV fluids running  Call light within reach   POC discussed with patient  All needs met a this time.

## 2018-06-29 NOTE — PROGRESS NOTES
Received report from evening RN.  Assumed care of patient.  Patient A&Ox4.  No complaints of pain at this time.  No numbness/tingling.  No nausea or vomiting.  NG tube to low continuous suction.  +BM this AM, large and soft.  Npo at this time.  Awaiting surgery this afternoon.  Ambulating with SBA.  Plan of care discussed.  Call light within reach.  Bed in lowest position.

## 2018-06-29 NOTE — DISCHARGE PLANNING
Agency/Facility Name: [facility]   Spoke To: Harika   Outcome: Patient has been accepted for service. Per Harika, patients home health orders will be [facility] by [facility] over[facility] Dr. Mariana Allen . Facility will schedule to follow up with patient on Monday.     [facility] or Weekend LSW will need to send the discharge summary to Harika once it is complete to fax number #[phone].     LSW Mahnaz has been notified.

## 2018-06-29 NOTE — PROGRESS NOTES
[facility] Hospitalist Progress Note    Date of Service: 2018    Chief Complaint  83 y.o. male admitted 2018 with SBO    Interval Problem Update  : Still not passing much gas. Lactic acidosis resolved. Ordered home health but declined by several agencies. SBFT now pending  : SBFT shows incomplete small bowel obstruction. Surgeon planning lysis of adhesions tomorrow. Creatinine increased to 1.69  : Patient hoping to have a bowel movement soon. Surg planning lysis of adhesions this afternoon. Repleting potassium. Sodium amina to 149. Changed fluids to D5W.    Disposition  Pending improvement of SBO  Ordered home health but rejected by agencies so patient will not be able to have home health        Review of Systems   Constitutional: Negative for chills and fever.        Dehydration   Respiratory: Negative for cough, shortness of breath and wheezing.    Cardiovascular: Negative for chest pain.   Gastrointestinal: Positive for abdominal pain, constipation and nausea. Negative for diarrhea and vomiting.   Genitourinary: Negative for dysuria.   Neurological: Negative for headaches.   Psychiatric/Behavioral: The patient is nervous/anxious.       Physical Exam  Laboratory/Imaging   Hemodynamics  Temp (24hrs), Av.9 °C (98.5 °F), Min:36.6 °C (97.9 °F), Max:37.3 °C (99.2 °F)   Temperature: 36.6 °C (97.9 °F)  Pulse  Av.8  Min: 46  Max: 103   Blood Pressure : 139/91      Respiratory      Respiration: 18, Pulse Oximetry: 95 %        RUL Breath Sounds: Clear, RML Breath Sounds: Diminished, RLL Breath Sounds: Diminished, LEXA Breath Sounds: Clear, LLL Breath Sounds: Diminished    Fluids    Intake/Output Summary (Last 24 hours) at 18 1032  Last data filed at 18 0400   Gross per 24 hour   Intake             1500 ml   Output             3025 ml   Net            -1525 ml       Nutrition  Orders Placed This Encounter   Procedures   • Diet NPO     Standing Status:   Standing     Number of Occurrences:    1     Order Specific Question:   Restrict to:     Answer:   Ice Chips [2]     Physical Exam   Constitutional: He appears well-developed.   Thin/frail   HENT:   Head: Normocephalic.   Eyes: Conjunctivae are normal.   Cardiovascular: Normal rate.    Pulmonary/Chest: No respiratory distress. He has no wheezes. He has no rales.   Abdominal: He exhibits distension. There is tenderness. There is no rebound and no guarding.   Neurological: He is alert.       Recent Labs      06/27/18   0609 06/28/18 0417 06/29/18   0539   WBC  8.1  7.6  4.7*   RBC  3.53*  3.67*  3.71*   HEMOGLOBIN  10.7*  11.1*  11.1*   HEMATOCRIT  33.6*  35.2*  35.4*   MCV  95.2  95.9  95.4   MCH  30.3  30.2  29.9   MCHC  31.8*  31.5*  31.4*   RDW  55.8*  58.0*  58.4*   PLATELETCT  206  234  227   MPV  9.1  9.4  9.4     Recent Labs      06/27/18   0609 06/28/18 0417 06/29/18   0539   SODIUM  139  145  149*   POTASSIUM  4.1  3.7  3.4*   CHLORIDE  109  111  113*   CO2  21  23  26   GLUCOSE  104*  94  94   BUN  32*  34*  34*   CREATININE  1.23  1.69*  1.51*   CALCIUM  8.4*  8.8  8.8                      Assessment/Plan     * SBO (small bowel obstruction) (HCC)- (present on admission)   Assessment & Plan    Initially trying conservative management with NPO  IV fluid hydration and symptomatic management for pain and nausea.   Dr. Gardner of surgery was consulted  NG tube to intermittent low wall suction.  SBFT confirms incomplete small bowel obstruction  Lysis of adhesions pending        Severe protein-calorie malnutrition (HCC)- (present on admission)   Assessment & Plan    severe 14% weight loss         T2DM (type 2 diabetes mellitus) (HCC)- (present on admission)   Assessment & Plan    No long-acting insulin while nothing by mouth, monitor with Accu-Cheks and cover with insulin sliding scale.        HLD (hyperlipidemia)- (present on admission)   Assessment & Plan    Holding home statin for now while he is nothing by mouth. Resume once cleared by  surgery for by mouth intake.        HTN (hypertension)- (present on admission)   Assessment & Plan    Holding home antihypertensives for now while he is nothing by mouth, cover with when necessary IV antihypertensives.          Quality-Core Measures   Reviewed items::  Labs reviewed and Medications reviewed  Carter catheter::  No Carter  DVT prophylaxis pharmacological::  Heparin

## 2018-06-30 PROBLEM — N28.9 RENAL INSUFFICIENCY: Status: ACTIVE | Noted: 2018-06-30

## 2018-06-30 LAB
ALBUMIN SERPL BCP-MCNC: 2.1 G/DL (ref 3.2–4.9)
ALBUMIN/GLOB SERPL: 1.1 G/DL
ALP SERPL-CCNC: 32 U/L (ref 30–99)
ALT SERPL-CCNC: 12 U/L (ref 2–50)
ANION GAP SERPL CALC-SCNC: 7 MMOL/L (ref 0–11.9)
ANION GAP SERPL CALC-SCNC: 8 MMOL/L (ref 0–11.9)
ANISOCYTOSIS BLD QL SMEAR: ABNORMAL
AST SERPL-CCNC: 15 U/L (ref 12–45)
BASOPHILS # BLD AUTO: 0 % (ref 0–1.8)
BASOPHILS # BLD: 0 K/UL (ref 0–0.12)
BILIRUB SERPL-MCNC: 0.6 MG/DL (ref 0.1–1.5)
BUN SERPL-MCNC: 29 MG/DL (ref 8–22)
BUN SERPL-MCNC: 31 MG/DL (ref 8–22)
CALCIUM SERPL-MCNC: 7.6 MG/DL (ref 8.5–10.5)
CALCIUM SERPL-MCNC: 8.4 MG/DL (ref 8.5–10.5)
CHLORIDE SERPL-SCNC: 115 MMOL/L (ref 96–112)
CHLORIDE SERPL-SCNC: 116 MMOL/L (ref 96–112)
CO2 SERPL-SCNC: 25 MMOL/L (ref 20–33)
CO2 SERPL-SCNC: 26 MMOL/L (ref 20–33)
CREAT SERPL-MCNC: 1.39 MG/DL (ref 0.5–1.4)
CREAT SERPL-MCNC: 1.44 MG/DL (ref 0.5–1.4)
EOSINOPHIL # BLD AUTO: 0 K/UL (ref 0–0.51)
EOSINOPHIL NFR BLD: 0 % (ref 0–6.9)
ERYTHROCYTE [DISTWIDTH] IN BLOOD BY AUTOMATED COUNT: 57.7 FL (ref 35.9–50)
GLOBULIN SER CALC-MCNC: 1.9 G/DL (ref 1.9–3.5)
GLUCOSE SERPL-MCNC: 163 MG/DL (ref 65–99)
GLUCOSE SERPL-MCNC: 187 MG/DL (ref 65–99)
HCT VFR BLD AUTO: 29.7 % (ref 42–52)
HGB BLD-MCNC: 9.6 G/DL (ref 14–18)
LYMPHOCYTES # BLD AUTO: 0.17 K/UL (ref 1–4.8)
LYMPHOCYTES NFR BLD: 3.5 % (ref 22–41)
MAGNESIUM SERPL-MCNC: 1.6 MG/DL (ref 1.5–2.5)
MANUAL DIFF BLD: NORMAL
MCH RBC QN AUTO: 30.6 PG (ref 27–33)
MCHC RBC AUTO-ENTMCNC: 32.3 G/DL (ref 33.7–35.3)
MCV RBC AUTO: 94.6 FL (ref 81.4–97.8)
MICROCYTES BLD QL SMEAR: ABNORMAL
MONOCYTES # BLD AUTO: 0.29 K/UL (ref 0–0.85)
MONOCYTES NFR BLD AUTO: 6.1 % (ref 0–13.4)
MORPHOLOGY BLD-IMP: NORMAL
NEUTROPHILS # BLD AUTO: 4.3 K/UL (ref 1.82–7.42)
NEUTROPHILS NFR BLD: 80 % (ref 44–72)
NEUTS BAND NFR BLD MANUAL: 9.5 % (ref 0–10)
NRBC # BLD AUTO: 0 K/UL
NRBC BLD-RTO: 0 /100 WBC
OVALOCYTES BLD QL SMEAR: NORMAL
PLATELET # BLD AUTO: 133 K/UL (ref 164–446)
PLATELET BLD QL SMEAR: NORMAL
PMV BLD AUTO: 9.5 FL (ref 9–12.9)
POIKILOCYTOSIS BLD QL SMEAR: NORMAL
POTASSIUM SERPL-SCNC: 3.7 MMOL/L (ref 3.6–5.5)
POTASSIUM SERPL-SCNC: 3.9 MMOL/L (ref 3.6–5.5)
PREALB SERPL-MCNC: 5 MG/DL (ref 18–38)
PROT SERPL-MCNC: 4 G/DL (ref 6–8.2)
RBC # BLD AUTO: 3.14 M/UL (ref 4.7–6.1)
RBC BLD AUTO: PRESENT
SODIUM SERPL-SCNC: 148 MMOL/L (ref 135–145)
SODIUM SERPL-SCNC: 149 MMOL/L (ref 135–145)
WBC # BLD AUTO: 4.8 K/UL (ref 4.8–10.8)

## 2018-06-30 PROCEDURE — 700111 HCHG RX REV CODE 636 W/ 250 OVERRIDE (IP): Performed by: SURGERY

## 2018-06-30 PROCEDURE — 700101 HCHG RX REV CODE 250: Performed by: SURGERY

## 2018-06-30 PROCEDURE — 84134 ASSAY OF PREALBUMIN: CPT

## 2018-06-30 PROCEDURE — 99233 SBSQ HOSP IP/OBS HIGH 50: CPT | Performed by: SURGERY

## 2018-06-30 PROCEDURE — 83735 ASSAY OF MAGNESIUM: CPT

## 2018-06-30 PROCEDURE — 80053 COMPREHEN METABOLIC PANEL: CPT

## 2018-06-30 PROCEDURE — 80048 BASIC METABOLIC PNL TOTAL CA: CPT

## 2018-06-30 PROCEDURE — 85007 BL SMEAR W/DIFF WBC COUNT: CPT

## 2018-06-30 PROCEDURE — 85027 COMPLETE CBC AUTOMATED: CPT

## 2018-06-30 PROCEDURE — 770022 HCHG ROOM/CARE - ICU (200)

## 2018-06-30 RX ORDER — DEXTROSE MONOHYDRATE, SODIUM CHLORIDE, AND POTASSIUM CHLORIDE 50; 1.49; 4.5 G/1000ML; G/1000ML; G/1000ML
INJECTION, SOLUTION INTRAVENOUS CONTINUOUS
Status: DISPENSED | OUTPATIENT
Start: 2018-06-30 | End: 2018-07-01

## 2018-06-30 RX ORDER — SODIUM CHLORIDE 1 G/1
2 TABLET ORAL
Status: DISCONTINUED | OUTPATIENT
Start: 2018-06-30 | End: 2018-06-30

## 2018-06-30 RX ORDER — DEXTROSE MONOHYDRATE 25 G/50ML
25 INJECTION, SOLUTION INTRAVENOUS
Status: DISCONTINUED | OUTPATIENT
Start: 2018-06-30 | End: 2018-07-14

## 2018-06-30 RX ORDER — SODIUM CHLORIDE, SODIUM LACTATE, POTASSIUM CHLORIDE, CALCIUM CHLORIDE 600; 310; 30; 20 MG/100ML; MG/100ML; MG/100ML; MG/100ML
250 INJECTION, SOLUTION INTRAVENOUS
Status: DISCONTINUED | OUTPATIENT
Start: 2018-06-30 | End: 2018-07-01

## 2018-06-30 RX ORDER — MAGNESIUM SULFATE 1 G/100ML
1 INJECTION INTRAVENOUS
Status: COMPLETED | OUTPATIENT
Start: 2018-06-30 | End: 2018-06-30

## 2018-06-30 RX ADMIN — HEPARIN SODIUM 5000 UNITS: 5000 INJECTION, SOLUTION INTRAVENOUS; SUBCUTANEOUS at 09:14

## 2018-06-30 RX ADMIN — HEPARIN SODIUM 5000 UNITS: 5000 INJECTION, SOLUTION INTRAVENOUS; SUBCUTANEOUS at 21:00

## 2018-06-30 RX ADMIN — POTASSIUM CHLORIDE, DEXTROSE MONOHYDRATE AND SODIUM CHLORIDE: 150; 5; 450 INJECTION, SOLUTION INTRAVENOUS at 11:56

## 2018-06-30 RX ADMIN — MORPHINE SULFATE 4 MG: 4 INJECTION INTRAVENOUS at 05:51

## 2018-06-30 RX ADMIN — HEPARIN SODIUM 5000 UNITS: 5000 INJECTION, SOLUTION INTRAVENOUS; SUBCUTANEOUS at 14:22

## 2018-06-30 RX ADMIN — MORPHINE SULFATE 4 MG: 4 INJECTION INTRAVENOUS at 09:14

## 2018-06-30 RX ADMIN — MAGNESIUM SULFATE 1 G: 1 INJECTION INTRAVENOUS at 11:57

## 2018-06-30 RX ADMIN — MORPHINE SULFATE 4 MG: 4 INJECTION INTRAVENOUS at 20:35

## 2018-06-30 RX ADMIN — MORPHINE SULFATE 4 MG: 4 INJECTION INTRAVENOUS at 12:51

## 2018-06-30 RX ADMIN — MAGNESIUM SULFATE 1 G: 1 INJECTION INTRAVENOUS at 12:51

## 2018-06-30 RX ADMIN — MORPHINE SULFATE 4 MG: 4 INJECTION INTRAVENOUS at 02:43

## 2018-06-30 ASSESSMENT — PAIN SCALES - GENERAL
PAINLEVEL_OUTOF10: 6
PAINLEVEL_OUTOF10: 5
PAINLEVEL_OUTOF10: 3
PAINLEVEL_OUTOF10: 4
PAINLEVEL_OUTOF10: 5
PAINLEVEL_OUTOF10: 6
PAINLEVEL_OUTOF10: 3
PAINLEVEL_OUTOF10: 8
PAINLEVEL_OUTOF10: 8
PAINLEVEL_OUTOF10: 9
PAINLEVEL_OUTOF10: 5
PAINLEVEL_OUTOF10: 5
PAINLEVEL_OUTOF10: 7
PAINLEVEL_OUTOF10: 4

## 2018-06-30 ASSESSMENT — LIFESTYLE VARIABLES: EVER_SMOKED: YES

## 2018-06-30 ASSESSMENT — COPD QUESTIONNAIRES
COPD SCREENING SCORE: 4
DO YOU EVER COUGH UP ANY MUCUS OR PHLEGM?: NO/ONLY WITH OCCASIONAL COLDS OR INFECTIONS
DURING THE PAST 4 WEEKS HOW MUCH DID YOU FEEL SHORT OF BREATH: NONE/LITTLE OF THE TIME
HAVE YOU SMOKED AT LEAST 100 CIGARETTES IN YOUR ENTIRE LIFE: YES

## 2018-06-30 NOTE — PROGRESS NOTES
Patient arrives from PACU on monitor, vital signs stable. Patient is lethargic but arouse able and oriented x4. 2 RN skin check completed.

## 2018-06-30 NOTE — OR NURSING
Dr. Trejo notified of H&H results. Order received to administer an additional 500 cc bolus and transfer pt to ICU.   Dr. Bobby also notified of BP. Dr. Bobby requesting that Critical Care Physicians manage BP.   Will transfer pt to ICU and notify receiving RN.

## 2018-06-30 NOTE — OP REPORT
DATE OF SERVICE:  06/29/2018    PREOPERATIVE DIAGNOSIS:  Small-bowel obstruction.    POSTOPERATIVE DIAGNOSIS:  Small-bowel obstruction.    PROCEDURES:  1.  Exploratory laparotomy.  2.  Lysis of adhesions.  3.  Small bowel resection.    SURGEON:  Emanuel Bobby MD    ASSISTANT:  SANTIAGO Kelly    ANESTHESIOLOGIST:  Dr. Trejo.    ANESTHESIA:  General endotracheal.    SPECIMENS:  Small bowel.    COMPLICATIONS:  None.    INDICATIONS:  This patient is a very pleasant 83-year-old male who was   recently admitted to an outside hospital for approximately 11 days with   small-bowel obstruction.  He has significant surgical history of radical   cystectomy with ileal conduit.  He apparently had resolution of his symptoms   to the point where he was discharged home; however, after being home for only   2 days, he began to have bilious emesis.  He was admitted here and did not   have resolution of his bowel obstruction.  Small bowel follow through revealed   at least a partial bowel obstruction, although there was a small amount of   contrast that passed into the colon.  I discussed with him operative   management given his prolonged obstructive picture.  We discussed the risks of   surgery including bleeding, infection, injury to bowel and/or other   structures, injury to the ileal conduit.  I discussed the possible need for   ostomy.  We discussed possible need for intensive care after the operation,   possible prolonged ventilation, and death.  He understood these risks and was   willing to proceed.    FINDINGS:  The small bowel in the pelvis was densely adherent to the ureter   and to the anastomosis to the ileal conduit.  Bowel loops around the ileal   conduit and adhesions that caused essentially closed loop obstruction.  The   bowel was extremely friable in this area and multiple enterotomies were caused   with even light finger pressure.  Eventually, we resected the section of   bowel containing the  enterotomies; however, during attempted anastomosis   developed still friable, we had to cut back to viable bowel.  Eventually, able   to make an enteroenterostomy in the small bowel.    PROCEDURE IN DETAIL:  Informed consent was obtained.  The patient was brought   to the operating table and placed in the supine position on the operating   table.  General anesthesia was induced.  Patient's abdomen was prepped and   draped in usual sterile fashion.  The time-out procedure was performed and   preincision antibiotics were given.  Utilizing his previous midline incision,   we dissected down through the subcutaneous tissues to the fascia.  The fascia   was opened adjacent to the umbilicus.  The fascia was free superiorly, but   however as we cut down inferiorly we began to encounter bowel which was   adherent to the anterior abdominal wall, performed lysis of adhesions for   approximately 2 hours.  Eventually, we were able to get down into the pelvis   where there was bowel that was densely adherent to the right ureter as well as   the anterior abdominal wall and pelvis.  During this dissection, the bowel   ruptured with light finger pressure.  The bowel was extremely friable in this   area and a couple of other additional enterotomies were made while we   continued to free up this area.  Eventually, we were able to free the bowel   away from the ureter which was looping behind and continued to freed up as it   loop behind the ileal conduit.  This appeared to be causing a closed loop   obstruction.  We eventually were able to free up the bowel enough to isolate   the areas of the multiple enterotomies.  The mesenteric defects were created   on either side and what appeared to be viable bowel and a RADAMES stapler was   fired across.  The mesentery was then taken with the LigaSure.  At this time,   as we attempted to grasp the bowel for creation of the anastomosis it was   apparent that this area was still friable.  We;  therefore, resected additional   bowel on each side.  Anastomosis was created and as we fired the TA stapler   across this, again the bowel was very friable on the staple line.  We   then chose areas that were well away from what appeared to be the friable   edematous bowel and RADAMES stapler was fired across each side, to take additional   sections of bowel.  A total of approximately 60 cm was resected.  A RADAMES   stapler was fired across the 2 limbs of bowel to create side-to-side   anastomosis.  The common defect was then closed with a TA 60 stapler.  The   staple line was then imbricated with 3-0 Vicryl sutures.  The mesenteric   defect was also closed with 3-0 Vicryl sutures.  I then thoroughly irrigated   the abdominal cavity.  The fascia was then closed with a running loop #1 PDS.    Skin was closed with a stapler.  Wound was dressed with a dressing.  Patient   was taken to recovery unit with plan for transfer to the intensive care unit.       ____________________________________     MD MOHINI Newsome / CAMPBELL    DD:  06/29/2018 21:28:44  DT:  06/29/2018 22:36:34    D#:  [id number]  Job#:  [id number]

## 2018-06-30 NOTE — OR SURGEON
Immediate Post OP Note    PreOp Diagnosis: small bowel obstruction    PostOp Diagnosis: same    Procedure(s):  Exploratory Laparotomy, Small Bowel Resection - Wound Class: Clean Contaminated  LYSIS ADHESIONS GENERAL - Wound Class: Clean Contaminated    Surgeon(s):  Emanuel Bobby M.D.    Anesthesiologist/Type of Anesthesia:  Anesthesiologist: Ever Mack M.D.; Pete Trejo M.D./General    Surgical Staff:  Assistant: GIBSON Rosas  Circulator: Shruthi Su RLUCAS; Cheryl Armenta RLUCAS  Relief Circulator: Jackie Blue R.N.  Scrub Person: Michael Geronimo; Philip Murguia  [place]: Alcides Agarwal R.N.    Specimens removed if any:  * No specimens in log *    Estimated Blood Loss: 400cc    Findings: friable bowel densly adherent to right ureter and ileal conduit with closed loop obstruction around ileal conduit. Multiple enterotomies requiring bowel resection    Complications: none        6/29/2018 9:17 PM Emanuel Bobby M.D.

## 2018-06-30 NOTE — OR NURSING
KARMEN CONTACTED. KARMEN ANSWERS PHONE. UPDATE. SURGERY COMPLETED. [facility]. PT HAD SMALL BOWEL RESECTION AND REMOVAL OF ADHESIONS.

## 2018-06-30 NOTE — OR NURSING
CONTACTED DR BRICE REGARDING SYSTOLIC BLOOD PRESSURES HIGH 70'S- MID- 80'S DESPITE FLUIDS. CALLED LAB CBC IS NOT COMPLETED YET.  DR BRICE ORDERS  CC WIDE OPEN. CALL WITH CBC WHEN RESULTED.    CONTACTED LAB. REPORTS STILL RUNNING. WILL RESULT IN ABOUT 15 MINUTES.    REPORT TO LARS. INCLUDING INFORMATION ABOVE, PT HISTORY, SURGICAL PROCEDURE. NO MEDICATIONS GIVEN.

## 2018-06-30 NOTE — OR NURSING
ISTAT drawn in PACU HGB 8.2 and HCT 24. Pt remains hypotensive. ABD soft non-distended. JONATHAN CDI.   Dr. Trejo paged to notify. Waiting for reply.

## 2018-06-30 NOTE — PROGRESS NOTES
"Surgical Progress Note:      Had some BM and flatus, but still with some distention    PE:  /84   Pulse 87   Temp 36.9 °C (98.5 °F)   Resp 16   Ht 1.854 m (6' 1\")   Wt 62.6 kg (138 lb 0.1 oz)   SpO2 95%   BMI 18.21 kg/m²     I/O:   Intake/Output Summary (Last 24 hours) at 06/29/18 1711  Last data filed at 06/29/18 1600   Gross per 24 hour   Intake             2232 ml   Output             3025 ml   Net             -793 ml     UOP:  PO:  IV:    GEN: NAd  COR: RRR  PULM: CTA  ABD: soft, NT, distended with palpable bowel loops, tympanic      Labs:  Recent Labs      06/27/18   0609  06/28/18   0417  06/29/18   0539   WBC  8.1  7.6  4.7*   RBC  3.53*  3.67*  3.71*   HEMOGLOBIN  10.7*  11.1*  11.1*   HEMATOCRIT  33.6*  35.2*  35.4*   MCV  95.2  95.9  95.4   MCH  30.3  30.2  29.9   RDW  55.8*  58.0*  58.4*   PLATELETCT  206  234  227   MPV  9.1  9.4  9.4     Recent Labs      06/27/18   0609  06/28/18   0417  06/29/18   0539   SODIUM  139  145  149*   POTASSIUM  4.1  3.7  3.4*   CHLORIDE  109  111  113*   CO2  21  23  26   GLUCOSE  104*  94  94   BUN  32*  34*  34*         A/P:   Having some bowel function after contrast load, however, given prolonged presentation and current exam,   I think exploration is still warranted  To OR for exploratory laparotomy     Emanuel Bobby M.D.  [facility]  [phone]    "

## 2018-06-30 NOTE — PROGRESS NOTES
"Surgical Progress Note:      Feels well  Pain controlled  No gas or BM    PE:  /84   Pulse 81   Temp 36.7 °C (98 °F)   Resp 13   Ht 1.854 m (6' 1\")   Wt 67.3 kg (148 lb 5.9 oz)   SpO2 97%   BMI 19.57 kg/m²     I/O:   Intake/Output Summary (Last 24 hours) at 06/30/18 1305  Last data filed at 06/30/18 1000   Gross per 24 hour   Intake             7807 ml   Output             1710 ml   Net             6097 ml     UOP:  PO:  IV:    GEN: NAD  COR: RRR  PULM: CTA  ABD: soft, TTP at incision, dressing c/d/i      Labs:  Recent Labs      06/28/18   0417  06/29/18   0539  06/30/18   0500   WBC  7.6  4.7*  4.8   RBC  3.67*  3.71*  3.14*   HEMOGLOBIN  11.1*  11.1*  9.6*   HEMATOCRIT  35.2*  35.4*  29.7*   MCV  95.9  95.4  94.6   MCH  30.2  29.9  30.6   RDW  58.0*  58.4*  57.7*   PLATELETCT  234  227  133*   MPV  9.4  9.4  9.5   NEUTSPOLYS   --    --   80.00*   LYMPHOCYTES   --    --   3.50*   MONOCYTES   --    --   6.10   EOSINOPHILS   --    --   0.00   BASOPHILS   --    --   0.00   RBCMORPHOLO   --    --   Present     Recent Labs      06/28/18   0417  06/29/18   0539  06/30/18   0500   SODIUM  145  149*  148*   POTASSIUM  3.7  3.4*  3.9   CHLORIDE  111  113*  116*   CO2  23  26  25   GLUCOSE  94  94  187*   BUN  34*  34*  31*         A/P: POD# 1  S/P ex lap small bowel resection  Ambulate  NPO for now  PICC for TPN     Emanuel Bobby M.D.  [facility]  [phone]    "

## 2018-06-30 NOTE — CARE PLAN
Problem: Pain Management  Goal: Pain level will decrease to patient's comfort goal  Outcome: PROGRESSING AS EXPECTED  Pain managed per MAR, patient educated to splint cough with a pillow to help decrease pain during coughing    Problem: Urinary Elimination:  Goal: Ability to reestablish a normal urinary elimination pattern will improve  Outcome: PROGRESSING AS EXPECTED  Urine output greater than 60ml every 2 hours. Voiding via urostomy

## 2018-06-30 NOTE — OR NURSING
Dr. Trejo notified of SCIP protocol, pt has not received beta blocker in the last 24 hours. No new orders received.

## 2018-06-30 NOTE — OR NURSING
"PT ARRIVES TO PACU 3A. HOOKED UP MONITOR.PT IS SLEEPY ANSWERS QUESTIONS APPROPRIATELY.  HAVING  ' A  LITTLE BIT \" OF PAIN AT SURGICAL SITE.    DR BRICE ORDERS STAT CBC.  "

## 2018-07-01 ENCOUNTER — APPOINTMENT (OUTPATIENT)
Dept: RADIOLOGY | Facility: MEDICAL CENTER | Age: 83
DRG: 329 | End: 2018-07-01
Attending: SURGERY
Payer: MEDICARE

## 2018-07-01 LAB
ANION GAP SERPL CALC-SCNC: 4 MMOL/L (ref 0–11.9)
BASOPHILS # BLD AUTO: 0 % (ref 0–1.8)
BASOPHILS # BLD: 0 K/UL (ref 0–0.12)
BUN SERPL-MCNC: 24 MG/DL (ref 8–22)
BURR CELLS BLD QL SMEAR: NORMAL
CALCIUM SERPL-MCNC: 8.1 MG/DL (ref 8.5–10.5)
CHLORIDE SERPL-SCNC: 114 MMOL/L (ref 96–112)
CO2 SERPL-SCNC: 26 MMOL/L (ref 20–33)
CREAT SERPL-MCNC: 1.13 MG/DL (ref 0.5–1.4)
EOSINOPHIL # BLD AUTO: 0 K/UL (ref 0–0.51)
EOSINOPHIL NFR BLD: 0 % (ref 0–6.9)
ERYTHROCYTE [DISTWIDTH] IN BLOOD BY AUTOMATED COUNT: 58.4 FL (ref 35.9–50)
GLUCOSE BLD-MCNC: 104 MG/DL (ref 65–99)
GLUCOSE BLD-MCNC: 153 MG/DL (ref 65–99)
GLUCOSE BLD-MCNC: 181 MG/DL (ref 65–99)
GLUCOSE BLD-MCNC: 81 MG/DL (ref 65–99)
GLUCOSE SERPL-MCNC: 171 MG/DL (ref 65–99)
HCT VFR BLD AUTO: 25.2 % (ref 42–52)
HGB BLD-MCNC: 8.1 G/DL (ref 14–18)
LYMPHOCYTES # BLD AUTO: 0.58 K/UL (ref 1–4.8)
LYMPHOCYTES NFR BLD: 10.6 % (ref 22–41)
MAGNESIUM SERPL-MCNC: 1.9 MG/DL (ref 1.5–2.5)
MANUAL DIFF BLD: NORMAL
MCH RBC QN AUTO: 30.6 PG (ref 27–33)
MCHC RBC AUTO-ENTMCNC: 32.1 G/DL (ref 33.7–35.3)
MCV RBC AUTO: 95.1 FL (ref 81.4–97.8)
METAMYELOCYTES NFR BLD MANUAL: 1.8 %
MONOCYTES # BLD AUTO: 0.29 K/UL (ref 0–0.85)
MONOCYTES NFR BLD AUTO: 5.3 % (ref 0–13.4)
MORPHOLOGY BLD-IMP: NORMAL
MYELOCYTES NFR BLD MANUAL: 0.9 %
NEUTROPHILS # BLD AUTO: 4.48 K/UL (ref 1.82–7.42)
NEUTROPHILS NFR BLD: 77.9 % (ref 44–72)
NEUTS BAND NFR BLD MANUAL: 3.5 % (ref 0–10)
NRBC # BLD AUTO: 0 K/UL
NRBC BLD-RTO: 0 /100 WBC
OVALOCYTES BLD QL SMEAR: NORMAL
PHOSPHATE SERPL-MCNC: 2.4 MG/DL (ref 2.5–4.5)
PLATELET # BLD AUTO: 97 K/UL (ref 164–446)
PLATELET BLD QL SMEAR: NORMAL
PMV BLD AUTO: 10 FL (ref 9–12.9)
POIKILOCYTOSIS BLD QL SMEAR: NORMAL
POTASSIUM SERPL-SCNC: 3.6 MMOL/L (ref 3.6–5.5)
RBC # BLD AUTO: 2.65 M/UL (ref 4.7–6.1)
RBC BLD AUTO: PRESENT
SODIUM SERPL-SCNC: 144 MMOL/L (ref 135–145)
WBC # BLD AUTO: 5.5 K/UL (ref 4.8–10.8)

## 2018-07-01 PROCEDURE — 700105 HCHG RX REV CODE 258: Performed by: NURSE PRACTITIONER

## 2018-07-01 PROCEDURE — 700101 HCHG RX REV CODE 250: Performed by: SURGERY

## 2018-07-01 PROCEDURE — 82962 GLUCOSE BLOOD TEST: CPT

## 2018-07-01 PROCEDURE — 700102 HCHG RX REV CODE 250 W/ 637 OVERRIDE(OP): Performed by: SURGERY

## 2018-07-01 PROCEDURE — 83735 ASSAY OF MAGNESIUM: CPT

## 2018-07-01 PROCEDURE — 700101 HCHG RX REV CODE 250: Performed by: NURSE PRACTITIONER

## 2018-07-01 PROCEDURE — 700111 HCHG RX REV CODE 636 W/ 250 OVERRIDE (IP): Performed by: SURGERY

## 2018-07-01 PROCEDURE — 85007 BL SMEAR W/DIFF WBC COUNT: CPT

## 2018-07-01 PROCEDURE — 85027 COMPLETE CBC AUTOMATED: CPT

## 2018-07-01 PROCEDURE — 36571 INSERT PICVAD CATH: CPT

## 2018-07-01 PROCEDURE — 99233 SBSQ HOSP IP/OBS HIGH 50: CPT | Performed by: SURGERY

## 2018-07-01 PROCEDURE — 84100 ASSAY OF PHOSPHORUS: CPT

## 2018-07-01 PROCEDURE — 700105 HCHG RX REV CODE 258: Performed by: SURGERY

## 2018-07-01 PROCEDURE — 80048 BASIC METABOLIC PNL TOTAL CA: CPT

## 2018-07-01 PROCEDURE — 02HV33Z INSERTION OF INFUSION DEVICE INTO SUPERIOR VENA CAVA, PERCUTANEOUS APPROACH: ICD-10-PCS | Performed by: INTERNAL MEDICINE

## 2018-07-01 PROCEDURE — 770001 HCHG ROOM/CARE - MED/SURG/GYN PRIV*

## 2018-07-01 RX ORDER — SODIUM CHLORIDE 450 MG/100ML
INJECTION, SOLUTION INTRAVENOUS CONTINUOUS
Status: DISCONTINUED | OUTPATIENT
Start: 2018-07-01 | End: 2018-07-02

## 2018-07-01 RX ADMIN — POTASSIUM CHLORIDE: 2 INJECTION, SOLUTION, CONCENTRATE INTRAVENOUS at 21:48

## 2018-07-01 RX ADMIN — POTASSIUM PHOSPHATE, MONOBASIC AND POTASSIUM PHOSPHATE, DIBASIC 30 MMOL: 224; 236 INJECTION, SOLUTION INTRAVENOUS at 14:32

## 2018-07-01 RX ADMIN — ONDANSETRON 4 MG: 2 INJECTION INTRAMUSCULAR; INTRAVENOUS at 19:10

## 2018-07-01 RX ADMIN — SODIUM CHLORIDE: 4.5 INJECTION, SOLUTION INTRAVENOUS at 22:04

## 2018-07-01 RX ADMIN — MORPHINE SULFATE 4 MG: 4 INJECTION INTRAVENOUS at 19:10

## 2018-07-01 RX ADMIN — ENOXAPARIN SODIUM 40 MG: 100 INJECTION SUBCUTANEOUS at 09:13

## 2018-07-01 RX ADMIN — INSULIN HUMAN 2 UNITS: 100 INJECTION, SOLUTION PARENTERAL at 17:48

## 2018-07-01 RX ADMIN — ONDANSETRON 4 MG: 2 INJECTION INTRAMUSCULAR; INTRAVENOUS at 09:20

## 2018-07-01 RX ADMIN — MORPHINE SULFATE 4 MG: 4 INJECTION INTRAVENOUS at 09:13

## 2018-07-01 ASSESSMENT — ENCOUNTER SYMPTOMS
DOUBLE VISION: 0
NAUSEA: 0
SHORTNESS OF BREATH: 0
VOMITING: 0
BLURRED VISION: 0
WEAKNESS: 1
ABDOMINAL PAIN: 1
FEVER: 0
COUGH: 0

## 2018-07-01 ASSESSMENT — PAIN SCALES - GENERAL
PAINLEVEL_OUTOF10: 3
PAINLEVEL_OUTOF10: 4
PAINLEVEL_OUTOF10: 3
PAINLEVEL_OUTOF10: 6
PAINLEVEL_OUTOF10: 3
PAINLEVEL_OUTOF10: 3
PAINLEVEL_OUTOF10: 2
PAINLEVEL_OUTOF10: 3

## 2018-07-01 NOTE — PROGRESS NOTES
"  Trauma/Surgical Progress Note    Author: Eleni Carter Date & Time created: 7/1/2018   1:11 PM     Interval Events:  POD #2 exploratory laparotomy, lysis of adhesions and small bowel resection  Abdomen soft, mild LLQ tenderness, hypoactive bowel sounds  Continue NPO, PICC today, TPN  Renal indices improved, Na normal  Potassium and phosphorous supplemented  Stable for transfer to [facility], Dr. Temple and Dr. Bobby aware    Review of Systems   Constitutional: Negative for fever and malaise/fatigue.   Eyes: Negative for blurred vision and double vision.   Respiratory: Negative for cough and shortness of breath.    Cardiovascular: Negative for leg swelling.   Gastrointestinal: Positive for abdominal pain (LLE). Negative for nausea and vomiting.   Genitourinary:        Urostomy   Neurological: Positive for weakness (Generalized).     Hemodynamics:  Blood pressure 134/84, pulse (!) 41, temperature 37 °C (98.6 °F), resp. rate (!) 8, height 1.854 m (6' 1\"), weight 66.8 kg (147 lb 4.3 oz), SpO2 92 %.     Respiratory:    Respiration: (!) 8, Pulse Oximetry: 92 %, O2 Daily Delivery Respiratory : Silicone Nasal Cannula     Work Of Breathing / Effort: Mild  RUL Breath Sounds: Diminished, RML Breath Sounds: Diminished, RLL Breath Sounds: Diminished, LEXA Breath Sounds: Diminished, LLL Breath Sounds: Diminished  Fluids:    Intake/Output Summary (Last 24 hours) at 07/01/18 1311  Last data filed at 07/01/18 0800   Gross per 24 hour   Intake             2500 ml   Output             1555 ml   Net              945 ml     Admit Weight: 59.4 kg (131 lb)  Current Weight: 66.8 kg (147 lb 4.3 oz)    Physical Exam   Constitutional: He is oriented to person, place, and time. No distress.   Eyes: Conjunctivae are normal.   Neck: Neck supple.   Cardiovascular: Normal rate.    Pulmonary/Chest: Effort normal.   Abdominal: Soft. He exhibits no distension. There is tenderness (LLQ).   Hypoactive  Midline incision with staples, well approximated "   Musculoskeletal: Normal range of motion. He exhibits no edema or tenderness.   Neurological: He is alert and oriented to person, place, and time.   Skin: Skin is warm and dry. He is not diaphoretic.   Psychiatric: He has a normal mood and affect. His behavior is normal.   Nursing note and vitals reviewed.      Medical Decision Making/Problem List:    Active Hospital Problems    Diagnosis   • Severe protein-calorie malnutrition (HCC) [E43]     Priority: High     Patient has not eaten in greater than 2 weeks  PICC line requested to start TPN  Trend nutritional parameters     • SBO (small bowel obstruction) ([facility]) [K56.609]     Priority: High     Long course small bowel obstruction without resolution  6/29 exploratory laparotomy, lysis of adhesion and small bowel resection  NPO awaiting return of bowel function  Nasogastric tube  Emanuel Bobby MD: General Surgery     • Renal insufficiency [N28.9]     Priority: Medium     Creatinine elevated with hypernatremia  MIVF and monitor urine output  7/1 Renal indices improved     • Acute hypernatremia [E87.0]     Priority: Medium     Sodium trending high: multifactorial  Reduce sodium load in TPN     • HTN (hypertension) [I10]     Priority: Low     Premorbid  Restart home medications once tolerating by mouth  Treat as needed     • HLD (hyperlipidemia) [E78.5]     Priority: Low     Premorbid  Resume home medications when tolerating diet     • T2DM (type 2 diabetes mellitus) ([facility]) [E11.9]     Priority: Low     Premorbid  On regular insulin sliding scale       Core Measures & Quality Metrics:  Radiology images reviewed, Labs reviewed and Medications reviewed  Carter catheter: No Carter (Urostomy)      DVT Prophylaxis: Enoxaparin (Lovenox)  DVT prophylaxis - mechanical: SCDs  Ulcer prophylaxis: Not indicated        NANCY Score     Discussed patient condition with RN, Patient and general surgery. Dr. Nieves

## 2018-07-01 NOTE — ASSESSMENT & PLAN NOTE
Long course small bowel obstruction without resolution  6/29 exploratory laparotomy, lysis of adhesion and small bowel resection  NG tube in place

## 2018-07-01 NOTE — WOUND TEAM
"[facility]   Inpatient Services   Established Ostomy Management/ troubleshooting  HPI: Reviewed  PMH: Reviewed   SH: Reviewed   Reason for Ostomy nurse consult:  Supplies.  Subjective: \"My wife does it for me.\"  Objective: Fecal rectal pouch being used as urostomy pouch.  Ostomy type: Urostomy.  Stoma location: RLQ  Stoma assessment:    Appearance:  Pink, healthy.   Size: 1\"   Protrusion: Well budded.   MC jxn: Resolved   Peristomal skin: Intact.    Ostomy Appliance (type and size): 2.25: 2 piece with paste ring.   Assessment: Educated RN about the urostomy pouch.  Interventions:  Removed appliance. Cleansed peristomal skin, dried well. Held gauze over stoma to catch urine while performing care. Cut out barrier at 1\" line, applied paste ring, placed barrier around stoma and rubbed to adhere. Snapped on pouch connected to down drain.   Pt education: Questions and concerns addressed.  Plan: Nursing to perform care.  Anticipated discharge needs: None.    "

## 2018-07-01 NOTE — PROGRESS NOTES
"  Trauma/Surgical Progress Note    Author: Donato Nieves Date & Time created: 6/30/2018   9:20 PM     Interval Events:  Postop day 1 after laparotomy, lysis of adhesions and small bowel resection  Pain reasonably controlled  Try to mobilize chair  Hypernatremia  PICC line placement for TPN  Afebrile    Hemodynamics:  Blood pressure 134/84, pulse 87, temperature 37 °C (98.6 °F), resp. rate 19, height 1.854 m (6' 1\"), weight 67.3 kg (148 lb 5.9 oz), SpO2 95 %.     Respiratory:    Respiration: 19, Pulse Oximetry: 95 %, O2 Daily Delivery Respiratory : Silicone Nasal Cannula     Work Of Breathing / Effort: Mild  RUL Breath Sounds: Diminished, RML Breath Sounds: Diminished, RLL Breath Sounds: Diminished, LEXA Breath Sounds: Diminished, LLL Breath Sounds: Diminished  Fluids:    Intake/Output Summary (Last 24 hours) at 06/30/18 2120  Last data filed at 06/30/18 2000   Gross per 24 hour   Intake             8275 ml   Output             1940 ml   Net             6335 ml     Admit Weight: 59.4 kg (131 lb)  Current Weight: 67.3 kg (148 lb 5.9 oz)    Physical Exam   Constitutional: He is oriented to person, place, and time. He appears well-developed and well-nourished. He is active and cooperative. He has a sickly appearance. Nasal cannula in place.   HENT:   Head: Normocephalic and atraumatic.   Mouth/Throat: Oropharyngeal exudate present.   Eyes: Conjunctivae and EOM are normal. No scleral icterus.   Neck: Normal range of motion.   Cardiovascular: Normal rate, regular rhythm and intact distal pulses.   No extrasystoles are present.   Pulmonary/Chest: No respiratory distress. He has decreased breath sounds in the right lower field and the left lower field. He has no wheezes. He has no rhonchi.   Abdominal: Soft. He exhibits distension. There is tenderness. There is no rebound.   Dressing clean and dry   Genitourinary:   Genitourinary Comments: Carter   Musculoskeletal: Normal range of motion. He exhibits edema.   Neurological: He " is alert and oriented to person, place, and time.   Skin: Skin is warm and dry. No pallor.   Nursing note and vitals reviewed.      Medical Decision Making/Problem List:    Active Hospital Problems    Diagnosis   • Severe protein-calorie malnutrition (HCC) [E43]     Priority: High     Patient has not eaten in greater than 2 weeks  PICC line requested to start TPN  Trend nutritional parameters     • SBO (small bowel obstruction) ([facility]) [K56.609]     Priority: High     Long course small bowel obstruction without resolution  6/29 exploratory laparotomy, lysis of adhesion and small bowel resection  NPO awaiting return of bowel function  Nasogastric tube  Emanuel Bobby MD: General Surgery     • Acute hypernatremia [E87.0]     Priority: Medium     Sodium trending high: multifactorial  Reduce sodium load in TPN     • HTN (hypertension) [I10]     Priority: Low     Premorbid  Restart home medications once tolerating by mouth  Treat as needed     • HLD (hyperlipidemia) [E78.5]     Priority: Low     Premorbid  Resume home medications when tolerating diet     • T2DM (type 2 diabetes mellitus) ([facility]) [E11.9]     Priority: Low     Premorbid  On regular insulin sliding scale       Core Measures & Quality Metrics:  Labs reviewed, Medications reviewed and Radiology images reviewed  Carter catheter: One or Two Days Post Surgery (Day of Surgery being Day 0)      DVT Prophylaxis: Enoxaparin (Lovenox)  DVT prophylaxis - mechanical: SCDs  Ulcer prophylaxis: Yes    Assessed for rehab: Patient unable to tolerate rehabilitation therapeutic regimen    NANCY Score  Discussed patient condition with RN, RT, Pharmacy and Dietary.

## 2018-07-01 NOTE — PROGRESS NOTES
PICC Insertion Procedure Note    Consents confirmed, vessel patency confirmed with ultrasound, real time ultrasound image printed and placed in patient chart. Risks and benefits of procedure explained to patient/family and education regarding central line associated bloodstream infections provided. Questions answered.     PICC placed in right upper arm per MD order with ultrasound guidance. 5 Fr, triple lumen PICC placed in basilic vein after one attempt(s). 2 cc's of 1% lidocaine injected intradermally, 21 gauge microintroducer needle and modified Seldinger technique used. 40 cm total catheter length, 40 cm external measurement inserted with good blood return. Each lumen flushed without resistance with 10 mL 0.9% normal saline. PICC line secured with Biopatch and Tegaderm.    PICC tip location in the SVC confirmed by chest xray. Pt tolerated procedure wekk.  Patient condition relayed to unit RN or ordering physician via this post procedure note in the EMR.     Abaxia Power PICC ref # [id number], Lot #  [id number]

## 2018-07-01 NOTE — CARE PLAN
Problem: Infection  Goal: Will remain free from infection  Outcome: PROGRESSING AS EXPECTED  No signs or symptoms of infection. Incision is dry and periwound skin is normal. No fevers noted. Standard precautions in place     Problem: Fluid Volume:  Goal: Will maintain balanced intake and output  Outcome: PROGRESSING AS EXPECTED

## 2018-07-01 NOTE — CARE PLAN
Problem: Safety  Goal: Will remain free from injury  Educated pt of fall precautions. Reminded to call for assistance and not to get out of bed without staff present. Bed alarm on. Pt verbalized understanding.     Problem: Pain Management  Goal: Pain level will decrease to patient's comfort goal  Will continue to monitor pain throughout this shift. PRN meds to be given if needed    Problem: Skin Integrity  Goal: Risk for impaired skin integrity will decrease  Reminded to turn q2h to prevent skin breakdown.  Skin assessed.

## 2018-07-02 LAB
ALBUMIN SERPL BCP-MCNC: 1.9 G/DL (ref 3.2–4.9)
ALBUMIN/GLOB SERPL: 0.7 G/DL
ALP SERPL-CCNC: 32 U/L (ref 30–99)
ALT SERPL-CCNC: 11 U/L (ref 2–50)
ANION GAP SERPL CALC-SCNC: 5 MMOL/L (ref 0–11.9)
AST SERPL-CCNC: 12 U/L (ref 12–45)
BASOPHILS # BLD AUTO: 0.3 % (ref 0–1.8)
BASOPHILS # BLD: 0.01 K/UL (ref 0–0.12)
BILIRUB SERPL-MCNC: 0.4 MG/DL (ref 0.1–1.5)
BUN SERPL-MCNC: 19 MG/DL (ref 8–22)
CALCIUM SERPL-MCNC: 8.6 MG/DL (ref 8.5–10.5)
CHLORIDE SERPL-SCNC: 111 MMOL/L (ref 96–112)
CHOLEST SERPL-MCNC: 78 MG/DL (ref 100–199)
CO2 SERPL-SCNC: 26 MMOL/L (ref 20–33)
CREAT SERPL-MCNC: 0.85 MG/DL (ref 0.5–1.4)
EOSINOPHIL # BLD AUTO: 0.03 K/UL (ref 0–0.51)
EOSINOPHIL NFR BLD: 0.9 % (ref 0–6.9)
ERYTHROCYTE [DISTWIDTH] IN BLOOD BY AUTOMATED COUNT: 58 FL (ref 35.9–50)
GLOBULIN SER CALC-MCNC: 2.6 G/DL (ref 1.9–3.5)
GLUCOSE BLD-MCNC: 110 MG/DL (ref 65–99)
GLUCOSE BLD-MCNC: 112 MG/DL (ref 65–99)
GLUCOSE BLD-MCNC: 125 MG/DL (ref 65–99)
GLUCOSE SERPL-MCNC: 110 MG/DL (ref 65–99)
HCT VFR BLD AUTO: 30.7 % (ref 42–52)
HGB BLD-MCNC: 9.7 G/DL (ref 14–18)
IMM GRANULOCYTES # BLD AUTO: 0.06 K/UL (ref 0–0.11)
IMM GRANULOCYTES NFR BLD AUTO: 1.8 % (ref 0–0.9)
LYMPHOCYTES # BLD AUTO: 0.39 K/UL (ref 1–4.8)
LYMPHOCYTES NFR BLD: 11.5 % (ref 22–41)
MAGNESIUM SERPL-MCNC: 1.6 MG/DL (ref 1.5–2.5)
MCH RBC QN AUTO: 30.2 PG (ref 27–33)
MCHC RBC AUTO-ENTMCNC: 32.1 G/DL (ref 33.7–35.3)
MCV RBC AUTO: 94.1 FL (ref 81.4–97.8)
MONOCYTES # BLD AUTO: 0.26 K/UL (ref 0–0.85)
MONOCYTES NFR BLD AUTO: 7.7 % (ref 0–13.4)
NEUTROPHILS # BLD AUTO: 2.63 K/UL (ref 1.82–7.42)
NEUTROPHILS NFR BLD: 77.8 % (ref 44–72)
NRBC # BLD AUTO: 0 K/UL
NRBC BLD-RTO: 0 /100 WBC
PHOSPHATE SERPL-MCNC: 3.1 MG/DL (ref 2.5–4.5)
PLATELET # BLD AUTO: 69 K/UL (ref 164–446)
PMV BLD AUTO: 9.6 FL (ref 9–12.9)
POTASSIUM SERPL-SCNC: 3.7 MMOL/L (ref 3.6–5.5)
PROT SERPL-MCNC: 4.5 G/DL (ref 6–8.2)
RBC # BLD AUTO: 3.24 M/UL (ref 4.7–6.1)
SODIUM SERPL-SCNC: 142 MMOL/L (ref 135–145)
TRIGL SERPL-MCNC: 121 MG/DL (ref 0–149)
WBC # BLD AUTO: 3.4 K/UL (ref 4.8–10.8)

## 2018-07-02 PROCEDURE — 700111 HCHG RX REV CODE 636 W/ 250 OVERRIDE (IP): Performed by: SURGERY

## 2018-07-02 PROCEDURE — 97162 PT EVAL MOD COMPLEX 30 MIN: CPT

## 2018-07-02 PROCEDURE — 84478 ASSAY OF TRIGLYCERIDES: CPT

## 2018-07-02 PROCEDURE — G8988 SELF CARE GOAL STATUS: HCPCS | Mod: CI

## 2018-07-02 PROCEDURE — G8979 MOBILITY GOAL STATUS: HCPCS | Mod: CI

## 2018-07-02 PROCEDURE — 83735 ASSAY OF MAGNESIUM: CPT

## 2018-07-02 PROCEDURE — 700101 HCHG RX REV CODE 250: Performed by: HOSPITALIST

## 2018-07-02 PROCEDURE — 700111 HCHG RX REV CODE 636 W/ 250 OVERRIDE (IP): Performed by: HOSPITALIST

## 2018-07-02 PROCEDURE — G8978 MOBILITY CURRENT STATUS: HCPCS | Mod: CK

## 2018-07-02 PROCEDURE — G8987 SELF CARE CURRENT STATUS: HCPCS | Mod: CK

## 2018-07-02 PROCEDURE — 82962 GLUCOSE BLOOD TEST: CPT | Mod: 91

## 2018-07-02 PROCEDURE — 84100 ASSAY OF PHOSPHORUS: CPT

## 2018-07-02 PROCEDURE — 770001 HCHG ROOM/CARE - MED/SURG/GYN PRIV*

## 2018-07-02 PROCEDURE — 97166 OT EVAL MOD COMPLEX 45 MIN: CPT

## 2018-07-02 PROCEDURE — 99232 SBSQ HOSP IP/OBS MODERATE 35: CPT | Performed by: HOSPITALIST

## 2018-07-02 PROCEDURE — 700105 HCHG RX REV CODE 258

## 2018-07-02 PROCEDURE — 80053 COMPREHEN METABOLIC PANEL: CPT

## 2018-07-02 PROCEDURE — 82465 ASSAY BLD/SERUM CHOLESTEROL: CPT

## 2018-07-02 PROCEDURE — 85025 COMPLETE CBC W/AUTO DIFF WBC: CPT

## 2018-07-02 PROCEDURE — 700105 HCHG RX REV CODE 258: Performed by: HOSPITALIST

## 2018-07-02 RX ORDER — GABAPENTIN 100 MG/1
100 CAPSULE ORAL 3 TIMES DAILY
COMMUNITY
End: 2018-12-27

## 2018-07-02 RX ORDER — SODIUM CHLORIDE 9 MG/ML
INJECTION, SOLUTION INTRAVENOUS
Status: COMPLETED
Start: 2018-07-02 | End: 2018-07-02

## 2018-07-02 RX ORDER — UBIDECARENONE 75 MG
100 CAPSULE ORAL DAILY
COMMUNITY
End: 2018-12-27

## 2018-07-02 RX ORDER — SIMVASTATIN 5 MG
5 TABLET ORAL NIGHTLY
COMMUNITY
End: 2018-12-27 | Stop reason: CLARIF

## 2018-07-02 RX ORDER — MAGNESIUM SULFATE 1 G/100ML
1 INJECTION INTRAVENOUS
Status: COMPLETED | OUTPATIENT
Start: 2018-07-02 | End: 2018-07-02

## 2018-07-02 RX ADMIN — MORPHINE SULFATE 4 MG: 4 INJECTION INTRAVENOUS at 17:35

## 2018-07-02 RX ADMIN — MORPHINE SULFATE 4 MG: 4 INJECTION INTRAVENOUS at 03:23

## 2018-07-02 RX ADMIN — MORPHINE SULFATE 4 MG: 4 INJECTION INTRAVENOUS at 08:05

## 2018-07-02 RX ADMIN — MAGNESIUM SULFATE IN DEXTROSE 1 G: 10 INJECTION, SOLUTION INTRAVENOUS at 15:07

## 2018-07-02 RX ADMIN — POTASSIUM CHLORIDE: 2 INJECTION, SOLUTION, CONCENTRATE INTRAVENOUS at 20:40

## 2018-07-02 RX ADMIN — ENOXAPARIN SODIUM 40 MG: 100 INJECTION SUBCUTANEOUS at 08:05

## 2018-07-02 RX ADMIN — SODIUM CHLORIDE 500 ML: 9 INJECTION, SOLUTION INTRAVENOUS at 12:17

## 2018-07-02 RX ADMIN — MAGNESIUM SULFATE IN DEXTROSE 1 G: 10 INJECTION, SOLUTION INTRAVENOUS at 13:52

## 2018-07-02 RX ADMIN — MORPHINE SULFATE 4 MG: 4 INJECTION INTRAVENOUS at 12:37

## 2018-07-02 ASSESSMENT — ENCOUNTER SYMPTOMS
FEVER: 0
HEADACHES: 0
DIARRHEA: 0
ABDOMINAL PAIN: 1
SHORTNESS OF BREATH: 0
NERVOUS/ANXIOUS: 1
CONSTIPATION: 1
NAUSEA: 0
COUGH: 0
CHILLS: 0
VOMITING: 0

## 2018-07-02 ASSESSMENT — COGNITIVE AND FUNCTIONAL STATUS - GENERAL
EATING MEALS: TOTAL
STANDING UP FROM CHAIR USING ARMS: A LITTLE
MOVING TO AND FROM BED TO CHAIR: UNABLE
DAILY ACTIVITIY SCORE: 13
PERSONAL GROOMING: A LITTLE
MOVING FROM LYING ON BACK TO SITTING ON SIDE OF FLAT BED: A LITTLE
WALKING IN HOSPITAL ROOM: A LITTLE
CLIMB 3 TO 5 STEPS WITH RAILING: A LITTLE
MOBILITY SCORE: 14
DRESSING REGULAR UPPER BODY CLOTHING: A LITTLE
TOILETING: TOTAL
TURNING FROM BACK TO SIDE WHILE IN FLAT BAD: UNABLE
DRESSING REGULAR LOWER BODY CLOTHING: A LOT
SUGGESTED CMS G CODE MODIFIER DAILY ACTIVITY: CL
SUGGESTED CMS G CODE MODIFIER MOBILITY: CL
HELP NEEDED FOR BATHING: A LITTLE

## 2018-07-02 ASSESSMENT — GAIT ASSESSMENTS
GAIT LEVEL OF ASSIST: STAND BY ASSIST
DISTANCE (FEET): 7

## 2018-07-02 ASSESSMENT — PAIN SCALES - GENERAL
PAINLEVEL_OUTOF10: 3
PAINLEVEL_OUTOF10: 4
PAINLEVEL_OUTOF10: 8
PAINLEVEL_OUTOF10: 9
PAINLEVEL_OUTOF10: 4
PAINLEVEL_OUTOF10: 3
PAINLEVEL_OUTOF10: 8
PAINLEVEL_OUTOF10: 2
PAINLEVEL_OUTOF10: 3
PAINLEVEL_OUTOF10: 7

## 2018-07-02 ASSESSMENT — ACTIVITIES OF DAILY LIVING (ADL): TOILETING: INDEPENDENT

## 2018-07-02 NOTE — THERAPY
"Physical Therapy Evaluation completed.   Bed Mobility:  Supine to Sit: Minimal Assist  Transfers: Sit to Stand: Stand by Assist  Gait: Level Of Assist: Stand by Assist with No Equipment Needed; limited distances and notable antalgic gait 2' to current pain levels; see below      Plan of Care: Will benefit from Physical Therapy 3 times per week  Discharge Recommendations: Equipment: Will Continue to Assess for Equipment Needs. See below    Pt presents to PT with impaired gait, balance, endurance and general locomotion associated with recent deconditioning and medical co-morbidities. He is able to demonstrate short distance ambulation with SBA/CGA with no becca LOB. His gait mechanics and balance are currently impaired though more 2' to current level of abdonimal pain and antalgic gait mechanics. Anticipate as pain managed/resolves, pt will progress well with increasing OOB activity given current objective findings and I PLOF. Currently, pt would require skilled PT/placement prior to medical dc to home though anticipate quick progression as noted prior and may be able to dc to home if pain managed. WIll continue to visit.       See \"Rehab Therapy-Acute\" Patient Summary Report for complete documentation.     "

## 2018-07-02 NOTE — PROGRESS NOTES
Assumed care of patient at 0700. Patient is alert and oriented, respirations are unlabored and regular, patient states pain 9 out of 10, appropriate medication administered. NG to right nare, minimal green output, TPN running at 70ml/hr, PICC to ABILIO, dressing intact, no signs of infection at this time. Island dressing to abdomen, no drainage noted, RLQ urostomy, pink and round stoma, clear yellow output. Bed in lowest position, call light within reach, patient states no needs at this time.

## 2018-07-02 NOTE — CARE PLAN
Problem: Venous Thromboembolism (VTW)/Deep Vein Thrombosis (DVT) Prevention:  Goal: Patient will participate in Venous Thrombosis (VTE)/Deep Vein Thrombosis (DVT)Prevention Measures  Outcome: PROGRESSING AS EXPECTED   07/01/18 2115   Mechanical/VTE Prophylaxis   Mechanical Prophylaxis  SCDs, Sequential Compression Device   SCDs, Sequential Compression Device On   OTHER   Risk Assessment Score 3   VTE RISK High   Pharmacologic Prophylaxis Used LMWH: Enoxaparin(Lovenox)       Problem: Pain Management  Goal: Pain level will decrease to patient's comfort goal  Outcome: PROGRESSING AS EXPECTED  Pts pain being managed with PRN pain medication.

## 2018-07-02 NOTE — PROGRESS NOTES
Report called. Vitals stable. PRN pain med and nausea med given for pt request. Awaiting transport.

## 2018-07-02 NOTE — CARE PLAN
Problem: Respiratory:  Goal: Respiratory status will improve  Outcome: PROGRESSING AS EXPECTED  Monitor O2 saturation, 1LO2 at this time, attempting to wean. Patient desats after movement/ambulation, encourage incentive spirometer, cough and deep breathe.    Problem: Mobility  Goal: Risk for activity intolerance will decrease  Outcome: PROGRESSING AS EXPECTED  Patient to sit in chair x2 today for a minimum of 1 hour, ambulate to bathroom for BM, medicate for pain before ambulation to increase walking.

## 2018-07-02 NOTE — PROGRESS NOTES
"Pt arrived to unit via pt transport.    Pt A&O x 4.     Vitals: /84   Pulse 70   Temp 37 °C (98.6 °F)   Resp (!) 11   Ht 1.854 m (6' 1\")   Wt 66.8 kg (147 lb 4.3 oz)   SpO2 97%   BMI 19.43 kg/m²      Pt rates pain 3 out of 10. Declines additional interventions at this time.      Neuro: ONEAL. Denies new onset of numbness/ tingling.     Cardiac: Denies new onset of chest pain.     Vascular: Pulses 1+ BUE, BLE. No edema noted.     Respiratory: Lungs sound clear/diminished to auscultation. On 2L of O2 via silicone nasal cannula.  on, satting in 90's. Denies SOB.     GI: Abdomen soft, tender. Hypoactive bowel sounds, + flatus, - BM, last BM 6/29/2018 prior to surgery. Denies nausea/ vomiting. Pt currently NPO. NG tube to R nare, low suction, + thin light green output.     : Pt voiding adequately through RLQ urostomy into a down drain bag. Clear yellow output.     MSK: Pt up to bathroom with one assist, tolerating well.     Integumentary: RUE PICC line in place running TPN and IV maintenance fluids. Midline abdominal incision, dressing in place, CDI. RLQ urostomy.      Labs noted.     Fall precautions in place: Bed locked in lowest position, Upper bed rails up, treaded socks in place, personal belongings within reach, call light within reach, appropriate mobility signs in place, - bed alarm. Pt calls appropriately.      Pt updated on POC.    "

## 2018-07-02 NOTE — PROGRESS NOTES
"Surgical Progress Note:    No acute events  Not passing gas  Some pain    PE:  /76   Pulse 69   Temp 36.9 °C (98.4 °F)   Resp 16   Ht 1.854 m (6' 1\")   Wt 66.8 kg (147 lb 4.3 oz)   SpO2 95%   BMI 19.43 kg/m²     I/O:   Intake/Output Summary (Last 24 hours) at 07/02/18 1147  Last data filed at 07/02/18 0800   Gross per 24 hour   Intake             2675 ml   Output             1875 ml   Net              800 ml     UOP:  PO:  IV:    GEN: NAD  COR: RRR  PULM: coarse crackles  ABD: soft, NTND      Labs:  Recent Labs      06/30/18   0500  07/01/18   0604  07/02/18   0335   WBC  4.8  5.5  3.4*   RBC  3.14*  2.65*  3.24*   HEMOGLOBIN  9.6*  8.1*  9.7*   HEMATOCRIT  29.7*  25.2*  30.7*   MCV  94.6  95.1  94.1   MCH  30.6  30.6  30.2   RDW  57.7*  58.4*  58.0*   PLATELETCT  133*  97*  69*   MPV  9.5  10.0  9.6   NEUTSPOLYS  80.00*  77.90*  77.80*   LYMPHOCYTES  3.50*  10.60*  11.50*   MONOCYTES  6.10  5.30  7.70   EOSINOPHILS  0.00  0.00  0.90   BASOPHILS  0.00  0.00  0.30   RBCMORPHOLO  Present  Present   --      Recent Labs      06/30/18   1650  07/01/18   0604  07/02/18   0335   SODIUM  149*  144  142   POTASSIUM  3.7  3.6  3.7   CHLORIDE  115*  114*  111   CO2  26  26  26   GLUCOSE  163*  171*  110*   BUN  29*  24*  19         A/P: POD# 3 ex lap bowel resection  Doing well overall  Continued ileus, keep NGT to suction, continue TPN  Limit fluids     Emanuel Bobby M.D.  [facility]  [phone]    "

## 2018-07-02 NOTE — DIETARY
"Nutrition Services: Nutrition Support Assessment     Day 6 of admit.  82 yo male with admitting diagnosis: SBO      Current problem list:  1. Type 2 diabetes   2. HTN   3. Malnutrition      Assessment:  Estimated Nutritional Needs: based on height: 72\", weight 62.6 kg via stand up scale on 6/26, BMI: 18.2 - underweight      Calculation/Equation: MSJ x 1.2 = 1665 kcals   Calories/day: 1575 - 1890 kcals (25 - 30 kcals/kg)   Protein/day: 75 - 95 gm (1.2 - 1.5 gm/kg)      Evaluation:   1. Pt is POD#3 ex lap bowel resection   2. NGT to suction +output   3. TPN initiated 7/1 d/t poor nutritional status PTA, limited signs of current bowel function and anticipated poor enteral access. TPN is currently providing 841 kcals, 50 gm amino acids and 25 gm lipids   4. Per RD assessment on 6/27; pt noted with inadequate nutrition x 1 month and severe wt loss of 14% x 2 weeks   5. Serum Glucose 110; K/Phos/Mg WNL   6. Pertinent meds reviewed   7. Noted with surgical incision to abdomen   8. Pt with increased protein needs d/t recent surgery     Pt with severe malnutrition of acute illness related to SBO as evidence by severe 14% unintentional weight loss over the past 2 weeks and <50% intake of estimated energy requirements for at least 2 weeks, per pt interview     Recommendations/Plan:  · TPN per [facility]   · Fluids per MD   · Advance to PO diet as pt is able   · Obtain measured weights per TPN protocol to help monitor fluid and nutritional status   · Monitor wt and lab trends     RD following       "

## 2018-07-02 NOTE — CARE PLAN
Problem: Nutritional:  Goal: Achieve adequate nutritional intake  Patient will advance diet or start Nutrition Support   Outcome: PROGRESSING AS EXPECTED  TPN initiated

## 2018-07-02 NOTE — PROGRESS NOTES
Pharmacy TPN Day # 2       2018    Dosing Weight   67 kg   TPN currently providing 50 % of goal  TPN goal: 1600 - 1800 kcal/day including 1.2-1.4 gm/kg/day Protein    TPN indication: SBO   Pertinent PMH: Patient has recent history of admission to outside hospital for SBO - was discharged and had recurrence of abdominal pain with vomiting. With poor nutrition over preceding weeks, limited signs of current bowel function and anticipated poor enteral access in coming days, TPN initiated for nutritional support    Temp (24hrs), Av.9 °C (98.4 °F), Min:36.7 °C (98 °F), Max:37 °C (98.6 °F)  .  Recent Labs      18   0500  18   1650  18   0604  18   0335   SODIUM  148*  149*  144  142   POTASSIUM  3.9  3.7  3.6  3.7   CHLORIDE  116*  115*  114*  111   CO2  25  26  26  26   BUN  31*  29*  24*  19   CREATININE  1.44*  1.39  1.13  0.85   GLUCOSE  187*  163*  171*  110*   CALCIUM  7.6*  8.4*  8.1*  8.6   ASTSGOT  15   --    --   12   ALTSGPT  12   --    --   11   ALBUMIN  2.1*   --    --   1.9*   TBILIRUBIN  0.6   --    --   0.4   PHOSPHORUS   --    --   2.4*  3.1   MAGNESIUM  1.6   --   1.9  1.6   PREALBUMIN  5.0*   --    --    --      Accu-Checks  Recent Labs      18   2306  18   0528  18   1222   POCGLUCOSE  81  110*  112*       Vitals:    18 2115 18 0000 18 0400 18 0720   BP: 116/68 107/66 104/65 130/76   Weight:       Height:           Intake/Output Summary (Last 24 hours) at 18 1239  Last data filed at 18 1204   Gross per 24 hour   Intake             2275 ml   Output             2335 ml   Net              -60 ml       Orders Placed This Encounter   Procedures   • Diet NPO     Standing Status:   Standing     Number of Occurrences:   1     Order Specific Question:   Restrict to:     Answer:   Ice Chips [2]     TPN for past 72 hours (Show up to 3 orders; newest on the left. Changes between the two most recent orders are indicated.)     Start  date and time   07/02/2018 2000 07/01/2018 2000      TPN Central Line Formulation [[id number]] TPN Central Line Formulation [[id number]]    Order Status  Active Last Dose in Progress    Last Given   07/01/2018 2148       Base    Clinisol 15%  90 g 50 g    dextrose 70%  270 g 115 g    fat emulsions 20%  40 g 25 g       Additives    potassium phosphates  15 mmol 15 mmol    potassium chloride  40 mEq 40 mEq    sodium acetate  75 mEq --    sodium chloride  75 mEq 130 mEq    magnesium sulfate  -- 16 mEq    calcium GLUConate  4.65 mEq 4.65 mEq    M.T.E. -5 Adult  1 mL 1 mL    M.V.I. ADULT  10 mL 10 mL    famotidine  40 mg 20 mg    thiamine  100 mg --    folic acid  1 mg --       Energy Contribution    Proteins  -- --    Dextrose  -- --    Lipids  -- --    Total  0 kcal 0 kcal       Electrolyte Ion Calculated Amount    Sodium  -- --    Potassium  -- --    Calcium  -- --    Magnesium  -- --    Aluminum  -- --    Phosphate  -- --    Chloride  -- --    Acetate  -- --       Other    Total Protein  -- --    Total Protein/kg  -- --    Glucose Infusion Rate  -- --    Osmolarity  -- --    Volume  1,992 mL 1,680 mL    Rate  83 mL/hr 70 mL/hr    Dosing Weight  66.8 kg 66.8 kg    Infusion Site  [facility]          This formula provides:  % kcal as lipids = 30%  Grams protein/kg = 1.3 g/kg/day  Non-protein calories = 1318  Kcals/kg = 25  Total daily calories = 1678    Comments:  1. Macronutrients:  Tolerated 50% of goal macronutrients yesterday, will advance to 100% today. D/t Patient's age and recent ALLAN decreased daily protein goal and increased carbohydrates.   2. Micronutrients: Due to recent hypernatremia make formulation 1/2 NS equivalent. Ancillary 1/2 NS @ 55mL/hr was stopped today by surgery. MgSO4 on shortage, replaced with 2g IV x1 seperately. Thiamine 100 mg & Folic acid 1 mg added today, plan for x3 days (7/2-7/4)  3. Glycemic control: BG within goal   4. Labs: CMP, Mg, Phos tomorrow.     Pharmacist will continue to  monitor daily and adjust TPN accordingly.     Griselda Clarke, Pharm.D

## 2018-07-02 NOTE — PROGRESS NOTES
[facility]ist Progress Note    Date of Service: 2018    Chief Complaint  83 y.o. male admitted 2018 with SBO    Interval Problem Update  : Still not passing much gas. Lactic acidosis resolved. Ordered home health but declined by several agencies. SBFT now pending  : SBFT shows incomplete small bowel obstruction. Surgeon planning lysis of adhesions tomorrow. Creatinine increased to 1.69  : Patient hoping to have a bowel movement soon. Surg planning lysis of adhesions this afternoon. Repleting potassium. Sodium amina to 149. Changed fluids to D5W.  -: SICU   : Repleting magnesium. IVF DC'd by surgery. Still having abdominal pain. Patient will try to walk tomorrow.    Disposition  Pending ability to eat so can be taken off TPN   Ordered home health but rejected by agencies so patient will not be able to have home health        Review of Systems   Constitutional: Negative for chills and fever.        Dehydration   Respiratory: Negative for cough and shortness of breath.    Cardiovascular: Negative for chest pain.   Gastrointestinal: Positive for abdominal pain and constipation. Negative for diarrhea, nausea and vomiting.   Genitourinary: Negative for dysuria.   Neurological: Negative for headaches.   Psychiatric/Behavioral: The patient is nervous/anxious.       Physical Exam  Laboratory/Imaging   Hemodynamics  Temp (24hrs), Av.9 °C (98.4 °F), Min:36.7 °C (98 °F), Max:37 °C (98.6 °F)   Temperature: 36.9 °C (98.4 °F)  Pulse  Av.7  Min: 41  Max: 125 Heart Rate (Monitored): 79  Blood Pressure : 130/76, NIBP: 127/80      Respiratory      Respiration: 16, Pulse Oximetry: 95 %, O2 Daily Delivery Respiratory : Silicone Nasal Cannula     Work Of Breathing / Effort: Shallow;Mild  RUL Breath Sounds: Clear, RML Breath Sounds: Diminished, RLL Breath Sounds: Diminished, LEXA Breath Sounds: Clear, LLL Breath Sounds: Diminished    Fluids    Intake/Output Summary (Last 24 hours) at 18  1244  Last data filed at 07/02/18 1204   Gross per 24 hour   Intake             2275 ml   Output             2335 ml   Net              -60 ml       Nutrition  Orders Placed This Encounter   Procedures   • Diet NPO     Standing Status:   Standing     Number of Occurrences:   1     Order Specific Question:   Restrict to:     Answer:   Ice Chips [2]     Physical Exam   Constitutional: He is oriented to person, place, and time. He appears well-developed.   Thin/frail   HENT:   Head: Normocephalic.   Eyes: Conjunctivae are normal.   Cardiovascular: Normal rate.    Pulmonary/Chest: No respiratory distress. He has no wheezes.   Abdominal: He exhibits distension. There is tenderness. There is no rebound and no guarding.   Midline bandages   Neurological: He is alert and oriented to person, place, and time.       Recent Labs      06/30/18   0500  07/01/18   0604  07/02/18   0335   WBC  4.8  5.5  3.4*   RBC  3.14*  2.65*  3.24*   HEMOGLOBIN  9.6*  8.1*  9.7*   HEMATOCRIT  29.7*  25.2*  30.7*   MCV  94.6  95.1  94.1   MCH  30.6  30.6  30.2   MCHC  32.3*  32.1*  32.1*   RDW  57.7*  58.4*  58.0*   PLATELETCT  133*  97*  69*   MPV  9.5  10.0  9.6     Recent Labs      06/30/18   1650  07/01/18   0604  07/02/18   0335   SODIUM  149*  144  142   POTASSIUM  3.7  3.6  3.7   CHLORIDE  115*  114*  111   CO2  26  26  26   GLUCOSE  163*  171*  110*   BUN  29*  24*  19   CREATININE  1.39  1.13  0.85   CALCIUM  8.4*  8.1*  8.6             Recent Labs      07/02/18   0335   TRIGLYCERIDE  121          Assessment/Plan     * SBO (small bowel obstruction) (HCC)- (present on admission)   Assessment & Plan    Initially trying conservative management with NPO  IV fluid hydration and symptomatic management for pain and nausea.   Dr. Gardner of surgery was consulted  NG tube to intermittent low wall suction.  SBFT confirms incomplete small bowel obstruction  Status post ex lap/lysis of adhesions 6/29        Severe protein-calorie malnutrition  (HCC)- (present on admission)   Assessment & Plan    severe 14% weight loss   Now started on TPN        Renal insufficiency   Assessment & Plan    Improving with hydration        Acute hypernatremia   Assessment & Plan    Now treating with TPN mix        T2DM (type 2 diabetes mellitus) (HCC)- (present on admission)   Assessment & Plan    No long-acting insulin while nothing by mouth, monitor with Accu-Cheks and cover with insulin sliding scale.        HLD (hyperlipidemia)- (present on admission)   Assessment & Plan    Holding home statin for now while he is nothing by mouth.   Resume once cleared by surgery for by mouth intake.        HTN (hypertension)- (present on admission)   Assessment & Plan    Holding home antihypertensives for now while he is nothing by mouth, cover with when necessary IV antihypertensives.  Resume once tolerating oral intake          Quality-Core Measures   Reviewed items::  Labs reviewed and Medications reviewed  Carter catheter::  No Carter  DVT prophylaxis pharmacological::  Heparin

## 2018-07-02 NOTE — THERAPY
"Occupational Therapy Evaluation completed.   Functional Status:  Mod A supine to sit with HOB raised.  Max A LB dressing.  Pt stood with CGA for ~30 sec.  Pt reports he had \"coughing fit\" prior to session and was feeling limited by pain, declined further activity.  Mod A to return to supine.  Plan of Care: Will benefit from Occupational Therapy 3 times per week  Discharge Recommendations:  Equipment: Will Continue to Assess for Equipment Needs. Post-acute therapy will depend on progress but anticipate if pt can increase OOB activity over next few days, pt will be able to DC home with HH.    See \"Rehab Therapy-Acute\" Patient Summary Report for complete documentation.    "

## 2018-07-02 NOTE — PROGRESS NOTES
Pharmacy TPN Day # 1       2018    Dosing Weight   67 kg  TPN currently providing 50% of goal      TPN goal: 2406-4062 kcal/day including 1.2-1.5 gm/kg/day Protein      TPN indication: SBO, anticipated prolonged NPO status       Pertinent PMH: Patient has recent history of admission to outside hospital for SBO - was discharged and had recurrence of abdominal pain with vomiting. With poor nutrition over preceding weeks, limited signs of current bowel function and anticipated poor enteral access in coming days, TPN initiated for nutritional support    Temp (24hrs), Av °C (98.6 °F), Min:36.8 °C (98.3 °F), Max:37.2 °C (98.9 °F)  .  Recent Labs      18   0539  18   0500  18   1650  18   0604   SODIUM  149*  148*  149*  144   POTASSIUM  3.4*  3.9  3.7  3.6   CHLORIDE  113*  116*  115*  114*   CO2  26  25  26  26   BUN  34*  31*  29*  24*   CREATININE  1.51*  1.44*  1.39  1.13   GLUCOSE  94  187*  163*  171*   CALCIUM  8.8  7.6*  8.4*  8.1*   ASTSGOT   --   15   --    --    ALTSGPT   --   12   --    --    ALBUMIN  2.7*  2.1*   --    --    TBILIRUBIN   --   0.6   --    --    PHOSPHORUS  3.7   --    --   2.4*   MAGNESIUM   --   1.6   --   1.9   PREALBUMIN   --   5.0*   --    --      Accu-Checks  Recent Labs      18   0010  18   1257  18   1744   POCGLUCOSE  153*  104*  181*       Vitals:    18 1200 18 1645 18 0000 18 0500   BP: 145/89 134/84     Weight:   67.3 kg (148 lb 5.9 oz) 66.8 kg (147 lb 4.3 oz)   Height:         Intake/Output Summary (Last 24 hours) at 18 1831  Last data filed at 18 1800   Gross per 24 hour   Intake             4000 ml   Output             1955 ml   Net             2045 ml       Orders Placed This Encounter   Procedures   • Diet NPO     Standing Status:   Standing     Number of Occurrences:   1     Order Specific Question:   Restrict to:     Answer:   Ice Chips [2]         TPN for past 72 hours (Show up to 3 orders;  newest on the left.)     Start date and time   07/01/2018 2000      TPN Central Line Formulation [[id number]]    Order Status  Active       Base    Clinisol 15%  50 g    dextrose 70%  115 g    fat emulsions 20%  25 g       Additives    potassium phosphates  15 mmol    potassium chloride  40 mEq    sodium chloride  130 mEq    magnesium sulfate  16 mEq    calcium GLUConate  4.65 mEq    M.T.E. -5 Adult  1 mL    M.V.I. ADULT  10 mL    famotidine  20 mg       Energy Contribution    Proteins  --    Dextrose  --    Lipids  --    Total  0 kcal       Electrolyte Ion Calculated Amount    Sodium  --    Potassium  --    Calcium  --    Magnesium  --    Aluminum  --    Phosphate  --    Chloride  --    Acetate  --       Other    Total Protein  --    Total Protein/kg  --    Glucose Infusion Rate  --    Osmolarity  --    Volume  1,680 mL    Rate  70 mL/hr    Dosing Weight  66.8 kg    Infusion Site  Central            This formula provides:  % kcal as lipids = 29.7  Grams protein/kg = 0.75  Non-protein calories = 641  Kcals/kg = 12.5  Total daily calories = 841    Comments:    -TPN initiated for SBO s/p surgery with anticipated prolonged NPO status  -Potassium/phosphorus repleted prior to TPN start  -Due to reported poor enteral intake recently, patient at high risk of refeeding - will initiate TPN at 50% goal macronutrients and titrate as tolerated  -With recent hypernatremia, made formulation NS equivalent - changed ancillary base fluids to 1/2NS as well following discussion with provider  -Labs ordered for tomorrow  -Pharmacy will continue to follow for adjustments to formulation as appropriate      Emanuel Padilla PharmD, BCPS

## 2018-07-03 PROBLEM — D64.9 NORMOCYTIC ANEMIA: Status: ACTIVE | Noted: 2018-07-03

## 2018-07-03 LAB
ALBUMIN SERPL BCP-MCNC: 2 G/DL (ref 3.2–4.9)
ALBUMIN/GLOB SERPL: 0.8 G/DL
ALP SERPL-CCNC: 37 U/L (ref 30–99)
ALT SERPL-CCNC: 10 U/L (ref 2–50)
ANION GAP SERPL CALC-SCNC: 5 MMOL/L (ref 0–11.9)
AST SERPL-CCNC: 13 U/L (ref 12–45)
BASOPHILS # BLD AUTO: 0 % (ref 0–1.8)
BASOPHILS # BLD: 0 K/UL (ref 0–0.12)
BILIRUB SERPL-MCNC: 0.6 MG/DL (ref 0.1–1.5)
BUN SERPL-MCNC: 20 MG/DL (ref 8–22)
CALCIUM SERPL-MCNC: 8.2 MG/DL (ref 8.5–10.5)
CHLORIDE SERPL-SCNC: 107 MMOL/L (ref 96–112)
CO2 SERPL-SCNC: 27 MMOL/L (ref 20–33)
CREAT SERPL-MCNC: 0.92 MG/DL (ref 0.5–1.4)
EOSINOPHIL # BLD AUTO: 0.04 K/UL (ref 0–0.51)
EOSINOPHIL NFR BLD: 0.9 % (ref 0–6.9)
ERYTHROCYTE [DISTWIDTH] IN BLOOD BY AUTOMATED COUNT: 56.7 FL (ref 35.9–50)
GLOBULIN SER CALC-MCNC: 2.6 G/DL (ref 1.9–3.5)
GLUCOSE BLD-MCNC: 140 MG/DL (ref 65–99)
GLUCOSE BLD-MCNC: 147 MG/DL (ref 65–99)
GLUCOSE BLD-MCNC: 156 MG/DL (ref 65–99)
GLUCOSE BLD-MCNC: 159 MG/DL (ref 65–99)
GLUCOSE SERPL-MCNC: 151 MG/DL (ref 65–99)
HCT VFR BLD AUTO: 23.7 % (ref 42–52)
HGB BLD-MCNC: 7.7 G/DL (ref 14–18)
HYPOCHROMIA BLD QL SMEAR: ABNORMAL
LYMPHOCYTES # BLD AUTO: 0.41 K/UL (ref 1–4.8)
LYMPHOCYTES NFR BLD: 9 % (ref 22–41)
MAGNESIUM SERPL-MCNC: 1.6 MG/DL (ref 1.5–2.5)
MANUAL DIFF BLD: NORMAL
MCH RBC QN AUTO: 30.6 PG (ref 27–33)
MCHC RBC AUTO-ENTMCNC: 32.5 G/DL (ref 33.7–35.3)
MCV RBC AUTO: 94 FL (ref 81.4–97.8)
MONOCYTES # BLD AUTO: 0.24 K/UL (ref 0–0.85)
MONOCYTES NFR BLD AUTO: 5.4 % (ref 0–13.4)
MORPHOLOGY BLD-IMP: NORMAL
MYELOCYTES NFR BLD MANUAL: 1.8 %
NEUTROPHILS # BLD AUTO: 3.73 K/UL (ref 1.82–7.42)
NEUTROPHILS NFR BLD: 82.9 % (ref 44–72)
NRBC # BLD AUTO: 0 K/UL
NRBC BLD-RTO: 0 /100 WBC
PHOSPHATE SERPL-MCNC: 2.5 MG/DL (ref 2.5–4.5)
PLATELET # BLD AUTO: 109 K/UL (ref 164–446)
PLATELET BLD QL SMEAR: NORMAL
PMV BLD AUTO: 10.3 FL (ref 9–12.9)
POIKILOCYTOSIS BLD QL SMEAR: NORMAL
POTASSIUM SERPL-SCNC: 3.4 MMOL/L (ref 3.6–5.5)
PROT SERPL-MCNC: 4.6 G/DL (ref 6–8.2)
RBC # BLD AUTO: 2.52 M/UL (ref 4.7–6.1)
RBC BLD AUTO: PRESENT
SMUDGE CELLS BLD QL SMEAR: NORMAL
SODIUM SERPL-SCNC: 139 MMOL/L (ref 135–145)
WBC # BLD AUTO: 4.5 K/UL (ref 4.8–10.8)

## 2018-07-03 PROCEDURE — 94760 N-INVAS EAR/PLS OXIMETRY 1: CPT

## 2018-07-03 PROCEDURE — 80053 COMPREHEN METABOLIC PANEL: CPT

## 2018-07-03 PROCEDURE — 85027 COMPLETE CBC AUTOMATED: CPT

## 2018-07-03 PROCEDURE — 700111 HCHG RX REV CODE 636 W/ 250 OVERRIDE (IP): Performed by: HOSPITALIST

## 2018-07-03 PROCEDURE — 85007 BL SMEAR W/DIFF WBC COUNT: CPT

## 2018-07-03 PROCEDURE — 700101 HCHG RX REV CODE 250: Performed by: SURGERY

## 2018-07-03 PROCEDURE — 82962 GLUCOSE BLOOD TEST: CPT

## 2018-07-03 PROCEDURE — 700111 HCHG RX REV CODE 636 W/ 250 OVERRIDE (IP): Performed by: SURGERY

## 2018-07-03 PROCEDURE — 700105 HCHG RX REV CODE 258

## 2018-07-03 PROCEDURE — 83735 ASSAY OF MAGNESIUM: CPT

## 2018-07-03 PROCEDURE — 99232 SBSQ HOSP IP/OBS MODERATE 35: CPT | Performed by: HOSPITALIST

## 2018-07-03 PROCEDURE — 84100 ASSAY OF PHOSPHORUS: CPT

## 2018-07-03 PROCEDURE — 770001 HCHG ROOM/CARE - MED/SURG/GYN PRIV*

## 2018-07-03 PROCEDURE — 700105 HCHG RX REV CODE 258: Performed by: SURGERY

## 2018-07-03 RX ORDER — MAGNESIUM SULFATE 1 G/100ML
1 INJECTION INTRAVENOUS
Status: COMPLETED | OUTPATIENT
Start: 2018-07-03 | End: 2018-07-03

## 2018-07-03 RX ORDER — SODIUM CHLORIDE 9 MG/ML
INJECTION, SOLUTION INTRAVENOUS
Status: COMPLETED
Start: 2018-07-03 | End: 2018-07-03

## 2018-07-03 RX ADMIN — POTASSIUM CHLORIDE: 2 INJECTION, SOLUTION, CONCENTRATE INTRAVENOUS at 20:08

## 2018-07-03 RX ADMIN — MAGNESIUM SULFATE IN DEXTROSE 1 G: 10 INJECTION, SOLUTION INTRAVENOUS at 10:54

## 2018-07-03 RX ADMIN — MORPHINE SULFATE 4 MG: 4 INJECTION INTRAVENOUS at 12:52

## 2018-07-03 RX ADMIN — MAGNESIUM SULFATE IN DEXTROSE 1 G: 10 INJECTION, SOLUTION INTRAVENOUS at 09:47

## 2018-07-03 RX ADMIN — MORPHINE SULFATE 4 MG: 4 INJECTION INTRAVENOUS at 05:46

## 2018-07-03 RX ADMIN — ENOXAPARIN SODIUM 40 MG: 100 INJECTION SUBCUTANEOUS at 09:47

## 2018-07-03 RX ADMIN — SODIUM CHLORIDE 500 ML: 9 INJECTION, SOLUTION INTRAVENOUS at 05:34

## 2018-07-03 RX ADMIN — SODIUM CHLORIDE 500 ML: 9 INJECTION, SOLUTION INTRAVENOUS at 20:16

## 2018-07-03 RX ADMIN — INSULIN HUMAN 2 UNITS: 100 INJECTION, SOLUTION PARENTERAL at 12:58

## 2018-07-03 RX ADMIN — MORPHINE SULFATE 4 MG: 4 INJECTION INTRAVENOUS at 23:41

## 2018-07-03 RX ADMIN — MORPHINE SULFATE 4 MG: 4 INJECTION INTRAVENOUS at 17:44

## 2018-07-03 RX ADMIN — MORPHINE SULFATE 4 MG: 4 INJECTION INTRAVENOUS at 09:47

## 2018-07-03 RX ADMIN — INSULIN HUMAN 2 UNITS: 100 INJECTION, SOLUTION PARENTERAL at 05:31

## 2018-07-03 ASSESSMENT — ENCOUNTER SYMPTOMS
SHORTNESS OF BREATH: 0
COUGH: 0
FEVER: 0
NAUSEA: 0
HEADACHES: 0
DIARRHEA: 0
CHILLS: 0
CONSTIPATION: 1
NERVOUS/ANXIOUS: 1
ABDOMINAL PAIN: 1
VOMITING: 0

## 2018-07-03 ASSESSMENT — PAIN SCALES - GENERAL
PAINLEVEL_OUTOF10: 7
PAINLEVEL_OUTOF10: 8
PAINLEVEL_OUTOF10: 9
PAINLEVEL_OUTOF10: 8
PAINLEVEL_OUTOF10: 8
PAINLEVEL_OUTOF10: 3
PAINLEVEL_OUTOF10: 3
PAINLEVEL_OUTOF10: 7
PAINLEVEL_OUTOF10: 3
PAINLEVEL_OUTOF10: 8
PAINLEVEL_OUTOF10: ASSUMED PAIN PRESENT
PAINLEVEL_OUTOF10: 10

## 2018-07-03 NOTE — CARE PLAN
Problem: Communication  Goal: The ability to communicate needs accurately and effectively will improve  Outcome: PROGRESSING AS EXPECTED  Pt updated on POC. All questions answered at this time.    Problem: Safety  Goal: Will remain free from injury  Outcome: PROGRESSING AS EXPECTED  Bed in locked and lowest position. Call light and belongings within reach.

## 2018-07-03 NOTE — PROGRESS NOTES
Pharmacy TPN Day # 3       7/3/2018    Dosing Weight   67 kg   TPN currently providing 50 % of goal  TPN goal: 1600 - 1800 kcal/day including 1.2-1.4 gm/kg/day Protein     TPN indication: SBO   Pertinent PMH: Patient has recent history of admission to @ [facility] x[facility] days for SBO - was discharged and had recurrence of abdominal pain with vomiting. With poor nutrition over preceding weeks, limited signs of current bowel function and anticipated poor enteral access in coming days, TPN initiated for nutritional support    Recent Labs      07/01/18   0604  07/02/18   0335  07/03/18   0335   SODIUM  144  142  139   POTASSIUM  3.6  3.7  3.4*   CHLORIDE  114*  111  107   CO2  26  26  27   BUN  24*  19  20   CREATININE  1.13  0.85  0.92   GLUCOSE  171*  110*  151*   CALCIUM  8.1*  8.6  8.2*   ASTSGOT   --   12  13   ALTSGPT   --   11  10   ALBUMIN   --   1.9*  2.0*   TBILIRUBIN   --   0.4  0.6   PHOSPHORUS  2.4*  3.1  2.5   MAGNESIUM  1.9  1.6  1.6     Accu-Checks  Recent Labs      07/02/18   1739  07/03/18   0051  07/03/18   0528   POCGLUCOSE  125*  147*  156*       Vitals:    07/02/18 2000 07/03/18 0000 07/03/18 0400 07/03/18 0700   BP: 119/76 133/83 125/76 111/72   Weight:       Height:           Intake/Output Summary (Last 24 hours) at 07/03/18 1141  Last data filed at 07/03/18 0400   Gross per 24 hour   Intake             2404 ml   Output             2330 ml   Net               74 ml       Orders Placed This Encounter   Procedures   • Diet NPO     Standing Status:   Standing     Number of Occurrences:   1     Order Specific Question:   Restrict to:     Answer:   Ice Chips [2]         TPN for past 72 hours (Show up to 3 orders; newest on the left. Changes between the two most recent orders are indicated.)     Start date and time   07/03/2018 2000 07/02/2018 2000 07/01/2018 2000      TPN Central Line Formulation [[id number]] TPN Central Line Formulation [[id number]] TPN Central Line Formulation [[id number]]    Order Status  Active  Last Dose in Progress Completed    Last Given   07/02/2018 2040 07/01/2018 2148       Base    Clinisol 15%  90 g 90 g 50 g    dextrose 70%  270 g 270 g 115 g    fat emulsions 20%  40 g 40 g 25 g       Additives    potassium phosphates  30 mmol 15 mmol 15 mmol    potassium chloride  40 mEq 40 mEq 40 mEq    sodium acetate  150 mEq 75 mEq --    sodium chloride  150 mEq 75 mEq 130 mEq    magnesium sulfate  -- -- 16 mEq    calcium GLUConate  4.65 mEq 4.65 mEq 4.65 mEq    M.T.E. -5 Adult  1 mL 1 mL 1 mL    M.V.I. ADULT  10 mL 10 mL 10 mL    famotidine  40 mg 40 mg 20 mg    thiamine  100 mg 100 mg --    folic acid  1 mg 1 mg --       Energy Contribution    Proteins  -- -- --    Dextrose  -- -- --    Lipids  -- -- --    Total  0 kcal 0 kcal 0 kcal       Electrolyte Ion Calculated Amount    Sodium  -- -- --    Potassium  -- -- --    Calcium  -- -- --    Magnesium  -- -- --    Aluminum  -- -- --    Phosphate  -- -- --    Chloride  -- -- --    Acetate  -- -- --       Other    Total Protein  -- -- --    Total Protein/kg  -- -- --    Glucose Infusion Rate  -- -- --    Osmolarity  -- -- --    Volume  1,992 mL 1,992 mL 1,680 mL    Rate  83 mL/hr 83 mL/hr 70 mL/hr    Dosing Weight  66.8 kg 66.8 kg 66.8 kg    Infusion Site  Central Central Central            This formula provides:  % kcal as lipids = 30%  Grams protein/kg = 1.3 g/kg/day  Non-protein calories = 1318  Kcals/kg = 25  Total daily calories = 1678    Comments:  1. Macronutrients:  Tolerating 100% macronutrients yesterday. Will continue on current regimen.  2. Micronutrients:   · Due to recent hypernatremia kept TPN at 1/2 NS equivalent. Sodium continues to trend down. Will change to NS equivalent.  · Potassium and Phos trending down: will increase KPhos to 30 mmol (45 mEq of K)   · MgSO4 on shortage, replaced with 2g IV again today (received 2g yesterday).   · Day2/3 Thiamine 100 mg & Folic acid 1 mg (7/2-7/4)  3. Glycemic control: BG within goal   4. Labs: CMP, Mg, Phos  tomorrow.      Pharmacist will continue to monitor daily and adjust TPN accordingly.       Griselda Clarke, Pharm.D

## 2018-07-03 NOTE — PROGRESS NOTES
[facility] Hospitalist Progress Note    Date of Service: 7/3/2018    Chief Complaint  83 y.o. male admitted 2018 with SBO    Interval Problem Update  : Still not passing much gas. Lactic acidosis resolved. Ordered home health but declined by several agencies. SBFT now pending  : SBFT shows incomplete small bowel obstruction. Surgeon planning lysis of adhesions tomorrow. Creatinine increased to 1.69  : Patient hoping to have a bowel movement soon. Surg planning lysis of adhesions this afternoon. Repleting potassium. Sodium amina to 149. Changed fluids to D5W.  -: SICU   : Repleting magnesium. IVF DC'd by surgery. Still having abdominal pain. Patient will try to walk tomorrow.  7/3: Further repleting magnesium. Hemoglobin decreased to 7.7. Patient felt better and did ambulate today however.    Disposition  Pending ability to eat so can be taken off TPN   Accepted by home health      Review of Systems   Constitutional: Negative for chills and fever.   Respiratory: Negative for cough and shortness of breath.    Cardiovascular: Negative for chest pain.   Gastrointestinal: Positive for abdominal pain and constipation. Negative for diarrhea, nausea and vomiting.   Genitourinary: Negative for dysuria.   Neurological: Negative for headaches.   Psychiatric/Behavioral: The patient is nervous/anxious.       Physical Exam  Laboratory/Imaging   Hemodynamics  Temp (24hrs), Av.3 °C (99.2 °F), Min:37.1 °C (98.7 °F), Max:37.7 °C (99.8 °F)   Temperature: 37.4 °C (99.4 °F)  Pulse  Av.3  Min: 41  Max: 125    Blood Pressure : 111/72      Respiratory      Respiration: 16, Pulse Oximetry: 92 %, O2 Daily Delivery Respiratory : Silicone Nasal Cannula     Work Of Breathing / Effort: Mild  RUL Breath Sounds: Clear, RML Breath Sounds: Diminished, RLL Breath Sounds: Diminished, LEXA Breath Sounds: Clear, LLL Breath Sounds: Diminished    Fluids    Intake/Output Summary (Last 24 hours) at 18 8568  Last data  filed at 07/03/18 1300   Gross per 24 hour   Intake             2708 ml   Output             1780 ml   Net              928 ml       Nutrition  Orders Placed This Encounter   Procedures   • Diet NPO     Standing Status:   Standing     Number of Occurrences:   1     Order Specific Question:   Restrict to:     Answer:   Ice Chips [2]     Physical Exam   Constitutional: He is oriented to person, place, and time. He appears well-developed.   Thin/frail   HENT:   Head: Normocephalic.   Eyes: Conjunctivae are normal.   Cardiovascular: Normal rate.    Pulmonary/Chest: No respiratory distress. He has no wheezes. He has no rales.   Abdominal: He exhibits distension. There is tenderness. There is no rebound and no guarding.   Midline bandages   Musculoskeletal:   Right arm PICC   Neurological: He is alert and oriented to person, place, and time.       Recent Labs      07/01/18   0604  07/02/18   0335  07/03/18   0335   WBC  5.5  3.4*  4.5*   RBC  2.65*  3.24*  2.52*   HEMOGLOBIN  8.1*  9.7*  7.7*   HEMATOCRIT  25.2*  30.7*  23.7*   MCV  95.1  94.1  94.0   MCH  30.6  30.2  30.6   MCHC  32.1*  32.1*  32.5*   RDW  58.4*  58.0*  56.7*   PLATELETCT  97*  69*  109*   MPV  10.0  9.6  10.3     Recent Labs      07/01/18   0604  07/02/18   0335  07/03/18   0335   SODIUM  144  142  139   POTASSIUM  3.6  3.7  3.4*   CHLORIDE  114*  111  107   CO2  26  26  27   GLUCOSE  171*  110*  151*   BUN  24*  19  20   CREATININE  1.13  0.85  0.92   CALCIUM  8.1*  8.6  8.2*             Recent Labs      07/02/18   0335   TRIGLYCERIDE  121          Assessment/Plan     * SBO (small bowel obstruction) (HCC)- (present on admission)   Assessment & Plan    Initially trying conservative management with NPO  IV fluid hydration and symptomatic management for pain and nausea.   Dr. Gardner of surgery was consulted  NG tube to intermittent low wall suction.  SBFT confirms incomplete small bowel obstruction  Status post ex lap/lysis of adhesions 6/29         Severe protein-calorie malnutrition (HCC)- (present on admission)   Assessment & Plan    severe 14% weight loss   Now started on TPN        Renal insufficiency   Assessment & Plan    Improving with hydration        Acute hypernatremia   Assessment & Plan    Now treating with TPN mix        T2DM (type 2 diabetes mellitus) (HCC)- (present on admission)   Assessment & Plan    - monitor with Accu-Cheks and cover with insulin sliding scale  - Consider starting long-acting insulin as needed        HLD (hyperlipidemia)- (present on admission)   Assessment & Plan    Holding home statin for now while he is nothing by mouth.   Resume once cleared by surgery for by mouth intake.        HTN (hypertension)- (present on admission)   Assessment & Plan    Holding home antihypertensives for now while he is nothing by mouth, cover with when necessary IV antihypertensives.  Resume once tolerating oral intake          Quality-Core Measures   Reviewed items::  Labs reviewed and Medications reviewed  Carter catheter::  No Carter  DVT prophylaxis pharmacological::  Heparin

## 2018-07-03 NOTE — CARE PLAN
Problem: Nutrition  Goal: Achieve adequate nutritional intake. Patient will consume > 50% of meals    Outcome: PROGRESSING AS EXPECTED  On TPN at 83/hr. Unable to advance diet at this time. Continue ice chips.    Problem: Bowel/Gastric:  Goal: Normal bowel function is maintained or improved  Outcome: PROGRESSING AS EXPECTED  Pt has pain in abdomen. Pt's last BM was 5/29. - flatus still, NGT to LCWS.

## 2018-07-03 NOTE — PROGRESS NOTES
Report received. POC discussed. Assumed care of pt.  Call light in reach. Hourly rounding in progress.  AxOx4. Pt calls approprietly.  RUE PICC line in place running TPN. Dressing CDI.  Pt gets up Ax1 with FWW. Pt diet NPO with ice chips. LBM 6/29.  NG to R nare on low cont sxn. + dark green/ brown output.  - flatus. Denies NV  + void vis RLQ urostomy to down draining bag. Clear yellow output.  Pt O2 sat 95 on 2L.   Pain 9/10 in abd.  Pt has received Morphine PRN. See MAR for detail.

## 2018-07-03 NOTE — CARE PLAN
Problem: Communication  Goal: The ability to communicate needs accurately and effectively will improve  Outcome: PROGRESSING AS EXPECTED  Reviewed POC with patient. All questions answered at this time. Educated pt to call when in need of pain medication.     Problem: Venous Thromboembolism (VTW)/Deep Vein Thrombosis (DVT) Prevention:  Goal: Patient will participate in Venous Thrombosis (VTE)/Deep Vein Thrombosis (DVT)Prevention Measures  Outcome: PROGRESSING AS EXPECTED   07/02/18 2055   Mechanical/VTE Prophylaxis   Mechanical Prophylaxis  SCDs, Sequential Compression Device   SCDs, Sequential Compression Device On   OTHER   Risk Assessment Score 3   VTE RISK High   Pharmacologic Prophylaxis Used LMWH: Enoxaparin(Lovenox)

## 2018-07-03 NOTE — PROGRESS NOTES
"Pt A&O x 4.     Vitals:  /76   Pulse 90   Temp 37.3 °C (99.1 °F)   Resp 18   Ht 1.854 m (6' 1\")   Wt 66.8 kg (147 lb 4.3 oz)   SpO2 94%   BMI 19.43 kg/m²        Pt rates pain 3 out of 10. Declines additional interventions at this time.      Neuro: ONEAL. Denies new onset of numbness/ tingling.     Cardiac: Denies new onset of chest pain.     Vascular: Pulses 1+ BUE, BLE. No edema noted.     Respiratory: Lungs sound clear/diminished to auscultation. On 2L of O2 via silicone nasal cannula.  on, satting in 90's. Denies SOB. Pt pulling 1000 on IS, strong effort.     GI: Abdomen soft, tender. Hypoactive bowel sounds, + flatus, - BM, last BM 6/29/2018 prior to surgery. Denies nausea/ vomiting. Pt currently NPO with ice chips. NG tube to R nare, low suction, + thin dark green/brown output.     : Pt voiding adequately through RLQ urostomy into a down draining bag. Clear yellow output.     MSK: Pt up to bathroom with one assist, tolerating well.     Integumentary: RUE PICC line in place running TPN. Midline abdominal incision, dressing in place, CDI. RLQ urostomy.       Labs noted.     Fall precautions in place: Bed locked in lowest position, Upper bed rails up, treaded socks in place, personal belongings within reach, call light within reach, appropriate mobility signs in place, - bed alarm. Pt calls appropriately.      Pt updated on POC.  "

## 2018-07-03 NOTE — PROGRESS NOTES
"Surgical Progress Note:      No acute events  Not passing gas yet    PE:  /72   Pulse 88   Temp 37.4 °C (99.4 °F)   Resp 16   Ht 1.854 m (6' 1\")   Wt 66.8 kg (147 lb 4.3 oz)   SpO2 95%   BMI 19.43 kg/m²     I/O:   Intake/Output Summary (Last 24 hours) at 07/03/18 0833  Last data filed at 07/03/18 0400   Gross per 24 hour   Intake             2404 ml   Output             2330 ml   Net               74 ml     UOP:  PO:  IV:    GEN: NAD  COR: RRR  PULM: CTA  ABD: soft, minimal distention      Labs:  Recent Labs      07/01/18   0604  07/02/18   0335  07/03/18   0335   WBC  5.5  3.4*  4.5*   RBC  2.65*  3.24*  2.52*   HEMOGLOBIN  8.1*  9.7*  7.7*   HEMATOCRIT  25.2*  30.7*  23.7*   MCV  95.1  94.1  94.0   MCH  30.6  30.2  30.6   RDW  58.4*  58.0*  56.7*   PLATELETCT  97*  69*  109*   MPV  10.0  9.6  10.3   NEUTSPOLYS  77.90*  77.80*  82.90*   LYMPHOCYTES  10.60*  11.50*  9.00*   MONOCYTES  5.30  7.70  5.40   EOSINOPHILS  0.00  0.90  0.90   BASOPHILS  0.00  0.30  0.00   RBCMORPHOLO  Present   --   Present     Recent Labs      07/01/18   0604  07/02/18   0335  07/03/18   0335   SODIUM  144  142  139   POTASSIUM  3.6  3.7  3.4*   CHLORIDE  114*  111  107   CO2  26  26  27   GLUCOSE  171*  110*  151*   BUN  24*  19  20         A/P: POD# 4  S/P ex lap JESSICA bowel resection  Awaiting bowel function  Continue NGT, NPO, TPN     Emanuel Bobby M.D.  [facility]  [phone]    "

## 2018-07-03 NOTE — PROGRESS NOTES
[facility] Hospitalist Progress Note    Date of Service: 7/3/2018    Chief Complaint  83 y.o. male admitted 2018 with SBO    Interval Problem Update  : Still not passing much gas. Lactic acidosis resolved. Ordered home health but declined by several agencies. SBFT now pending  : SBFT shows incomplete small bowel obstruction. Surgeon planning lysis of adhesions tomorrow. Creatinine increased to 1.69  : Patient hoping to have a bowel movement soon. Surg planning lysis of adhesions this afternoon. Repleting potassium. Sodium amina to 149. Changed fluids to D5W.  -: SICU   : Repleting magnesium. IVF DC'd by surgery. Still having abdominal pain. Patient will try to walk tomorrow.  7/3: Further repleting magnesium. Hemoglobin decreased to 7.7. Patient felt better and did ambulate today however.    Disposition  Pending ability to eat so can be taken off TPN   Accepted by home health      Review of Systems   Constitutional: Negative for chills and fever.   Respiratory: Negative for cough and shortness of breath.    Cardiovascular: Negative for chest pain.   Gastrointestinal: Positive for abdominal pain and constipation. Negative for diarrhea, nausea and vomiting.   Genitourinary: Negative for dysuria.   Neurological: Negative for headaches.   Psychiatric/Behavioral: The patient is nervous/anxious.       Physical Exam  Laboratory/Imaging   Hemodynamics  Temp (24hrs), Av.3 °C (99.2 °F), Min:37.1 °C (98.7 °F), Max:37.7 °C (99.8 °F)   Temperature: 37.4 °C (99.4 °F)  Pulse  Av.3  Min: 41  Max: 125    Blood Pressure : 111/72      Respiratory      Respiration: 16, Pulse Oximetry: 92 %, O2 Daily Delivery Respiratory : Silicone Nasal Cannula     Work Of Breathing / Effort: Mild  RUL Breath Sounds: Clear, RML Breath Sounds: Diminished, RLL Breath Sounds: Diminished, LEXA Breath Sounds: Clear, LLL Breath Sounds: Diminished    Fluids    Intake/Output Summary (Last 24 hours) at 18 1530  Last data  filed at 07/03/18 1300   Gross per 24 hour   Intake             2708 ml   Output             1780 ml   Net              928 ml       Nutrition  Orders Placed This Encounter   Procedures   • Diet NPO     Standing Status:   Standing     Number of Occurrences:   1     Order Specific Question:   Restrict to:     Answer:   Ice Chips [2]     Physical Exam   Constitutional: He is oriented to person, place, and time. He appears well-developed.   Thin/frail   HENT:   Head: Normocephalic.   Eyes: Conjunctivae are normal.   Cardiovascular: Normal rate.    Pulmonary/Chest: No respiratory distress. He has no wheezes. He has no rales.   Abdominal: He exhibits distension. There is tenderness. There is no rebound and no guarding.   Midline bandages   Musculoskeletal:   Right arm PICC   Neurological: He is alert and oriented to person, place, and time.       Recent Labs      07/01/18   0604  07/02/18   0335  07/03/18   0335   WBC  5.5  3.4*  4.5*   RBC  2.65*  3.24*  2.52*   HEMOGLOBIN  8.1*  9.7*  7.7*   HEMATOCRIT  25.2*  30.7*  23.7*   MCV  95.1  94.1  94.0   MCH  30.6  30.2  30.6   MCHC  32.1*  32.1*  32.5*   RDW  58.4*  58.0*  56.7*   PLATELETCT  97*  69*  109*   MPV  10.0  9.6  10.3     Recent Labs      07/01/18   0604  07/02/18   0335  07/03/18   0335   SODIUM  144  142  139   POTASSIUM  3.6  3.7  3.4*   CHLORIDE  114*  111  107   CO2  26  26  27   GLUCOSE  171*  110*  151*   BUN  24*  19  20   CREATININE  1.13  0.85  0.92   CALCIUM  8.1*  8.6  8.2*             Recent Labs      07/02/18   0335   TRIGLYCERIDE  121          Assessment/Plan     * SBO (small bowel obstruction) (HCC)- (present on admission)   Assessment & Plan    Initially trying conservative management with NPO  IV fluid hydration and symptomatic management for pain and nausea.   Dr. Gardner of surgery was consulted  NG tube to intermittent low wall suction.  SBFT confirms incomplete small bowel obstruction  Status post ex lap/lysis of adhesions 6/29         Severe protein-calorie malnutrition (HCC)- (present on admission)   Assessment & Plan    severe 14% weight loss   Now started on TPN        Renal insufficiency   Assessment & Plan    Improving with hydration        Acute hypernatremia   Assessment & Plan    Now treating with TPN mix        Normocytic anemia- (present on admission)   Assessment & Plan    - With a component of surgical blood loss  - Iron and ferritin pending        T2DM (type 2 diabetes mellitus) (HCC)- (present on admission)   Assessment & Plan    - monitor with Accu-Cheks and cover with insulin sliding scale  - Consider starting long-acting insulin as needed        HLD (hyperlipidemia)- (present on admission)   Assessment & Plan    Holding home statin for now while he is nothing by mouth.   Resume once cleared by surgery for by mouth intake.        HTN (hypertension)- (present on admission)   Assessment & Plan    Holding home antihypertensives for now while he is nothing by mouth, cover with when necessary IV antihypertensives.  Resume once tolerating oral intake          Quality-Core Measures   Reviewed items::  Labs reviewed and Medications reviewed  Carter catheter::  No Carter  DVT prophylaxis pharmacological::  Heparin

## 2018-07-04 ENCOUNTER — APPOINTMENT (OUTPATIENT)
Dept: RADIOLOGY | Facility: MEDICAL CENTER | Age: 83
DRG: 329 | End: 2018-07-04
Attending: HOSPITALIST
Payer: MEDICARE

## 2018-07-04 ENCOUNTER — APPOINTMENT (OUTPATIENT)
Dept: RADIOLOGY | Facility: MEDICAL CENTER | Age: 83
DRG: 329 | End: 2018-07-04
Attending: SURGERY
Payer: MEDICARE

## 2018-07-04 PROBLEM — E87.0 ACUTE HYPERNATREMIA: Status: RESOLVED | Noted: 2018-06-29 | Resolved: 2018-07-04

## 2018-07-04 PROBLEM — E87.6 HYPOKALEMIA: Status: ACTIVE | Noted: 2018-07-04

## 2018-07-04 LAB
ABO GROUP BLD: NORMAL
ABO GROUP BLD: NORMAL
ANION GAP SERPL CALC-SCNC: 6 MMOL/L (ref 0–11.9)
BARCODED ABORH UBTYP: 5100
BARCODED PRD CODE UBPRD: NORMAL
BARCODED UNIT NUM UBUNT: NORMAL
BASOPHILS # BLD AUTO: 0 % (ref 0–1.8)
BASOPHILS # BLD: 0 K/UL (ref 0–0.12)
BLD GP AB SCN SERPL QL: NORMAL
BUN SERPL-MCNC: 21 MG/DL (ref 8–22)
CALCIUM SERPL-MCNC: 7.7 MG/DL (ref 8.5–10.5)
CHLORIDE SERPL-SCNC: 106 MMOL/L (ref 96–112)
CO2 SERPL-SCNC: 30 MMOL/L (ref 20–33)
COMPONENT R 8504R: NORMAL
CREAT SERPL-MCNC: 0.92 MG/DL (ref 0.5–1.4)
EOSINOPHIL # BLD AUTO: 0.06 K/UL (ref 0–0.51)
EOSINOPHIL NFR BLD: 0.9 % (ref 0–6.9)
ERYTHROCYTE [DISTWIDTH] IN BLOOD BY AUTOMATED COUNT: 55.2 FL (ref 35.9–50)
FERRITIN SERPL-MCNC: 782.2 NG/ML (ref 22–322)
GLUCOSE BLD-MCNC: 142 MG/DL (ref 65–99)
GLUCOSE BLD-MCNC: 145 MG/DL (ref 65–99)
GLUCOSE BLD-MCNC: 148 MG/DL (ref 65–99)
GLUCOSE BLD-MCNC: 150 MG/DL (ref 65–99)
GLUCOSE BLD-MCNC: 151 MG/DL (ref 65–99)
GLUCOSE SERPL-MCNC: 133 MG/DL (ref 65–99)
HCT VFR BLD AUTO: 23.5 % (ref 42–52)
HGB BLD-MCNC: 7.6 G/DL (ref 14–18)
IRON SATN MFR SERPL: ABNORMAL % (ref 15–55)
IRON SERPL-MCNC: <10 UG/DL (ref 50–180)
LYMPHOCYTES # BLD AUTO: 0.47 K/UL (ref 1–4.8)
LYMPHOCYTES NFR BLD: 7.2 % (ref 22–41)
MAGNESIUM SERPL-MCNC: 1.4 MG/DL (ref 1.5–2.5)
MANUAL DIFF BLD: NORMAL
MCH RBC QN AUTO: 29.9 PG (ref 27–33)
MCHC RBC AUTO-ENTMCNC: 32.3 G/DL (ref 33.7–35.3)
MCV RBC AUTO: 92.5 FL (ref 81.4–97.8)
MONOCYTES # BLD AUTO: 0.29 K/UL (ref 0–0.85)
MONOCYTES NFR BLD AUTO: 4.5 % (ref 0–13.4)
MORPHOLOGY BLD-IMP: NORMAL
MYELOCYTES NFR BLD MANUAL: 0.9 %
NEUTROPHILS # BLD AUTO: 5.62 K/UL (ref 1.82–7.42)
NEUTROPHILS NFR BLD: 86.5 % (ref 44–72)
NRBC # BLD AUTO: 0 K/UL
NRBC BLD-RTO: 0 /100 WBC
PHOSPHATE SERPL-MCNC: 2.9 MG/DL (ref 2.5–4.5)
PLATELET # BLD AUTO: 126 K/UL (ref 164–446)
PLATELET BLD QL SMEAR: NORMAL
PMV BLD AUTO: 9.3 FL (ref 9–12.9)
POTASSIUM SERPL-SCNC: 3.2 MMOL/L (ref 3.6–5.5)
PRODUCT TYPE UPROD: NORMAL
RBC # BLD AUTO: 2.54 M/UL (ref 4.7–6.1)
RBC BLD AUTO: NORMAL
RH BLD: NORMAL
RH BLD: NORMAL
SODIUM SERPL-SCNC: 142 MMOL/L (ref 135–145)
TIBC SERPL-MCNC: 139 UG/DL (ref 250–450)
UNIT STATUS USTAT: NORMAL
WBC # BLD AUTO: 6.5 K/UL (ref 4.8–10.8)

## 2018-07-04 PROCEDURE — 700101 HCHG RX REV CODE 250: Performed by: HOSPITALIST

## 2018-07-04 PROCEDURE — 83735 ASSAY OF MAGNESIUM: CPT

## 2018-07-04 PROCEDURE — 80048 BASIC METABOLIC PNL TOTAL CA: CPT

## 2018-07-04 PROCEDURE — 85027 COMPLETE CBC AUTOMATED: CPT

## 2018-07-04 PROCEDURE — 84100 ASSAY OF PHOSPHORUS: CPT

## 2018-07-04 PROCEDURE — 83550 IRON BINDING TEST: CPT

## 2018-07-04 PROCEDURE — 82728 ASSAY OF FERRITIN: CPT

## 2018-07-04 PROCEDURE — P9016 RBC LEUKOCYTES REDUCED: HCPCS

## 2018-07-04 PROCEDURE — 86850 RBC ANTIBODY SCREEN: CPT

## 2018-07-04 PROCEDURE — 700111 HCHG RX REV CODE 636 W/ 250 OVERRIDE (IP): Performed by: SURGERY

## 2018-07-04 PROCEDURE — 770001 HCHG ROOM/CARE - MED/SURG/GYN PRIV*

## 2018-07-04 PROCEDURE — 74019 RADEX ABDOMEN 2 VIEWS: CPT

## 2018-07-04 PROCEDURE — 700105 HCHG RX REV CODE 258: Performed by: HOSPITALIST

## 2018-07-04 PROCEDURE — 99232 SBSQ HOSP IP/OBS MODERATE 35: CPT | Performed by: HOSPITALIST

## 2018-07-04 PROCEDURE — 36430 TRANSFUSION BLD/BLD COMPNT: CPT

## 2018-07-04 PROCEDURE — 86900 BLOOD TYPING SEROLOGIC ABO: CPT

## 2018-07-04 PROCEDURE — 86901 BLOOD TYPING SEROLOGIC RH(D): CPT

## 2018-07-04 PROCEDURE — 700111 HCHG RX REV CODE 636 W/ 250 OVERRIDE (IP): Performed by: HOSPITALIST

## 2018-07-04 PROCEDURE — 83540 ASSAY OF IRON: CPT

## 2018-07-04 PROCEDURE — 82962 GLUCOSE BLOOD TEST: CPT

## 2018-07-04 PROCEDURE — 85007 BL SMEAR W/DIFF WBC COUNT: CPT

## 2018-07-04 PROCEDURE — 86923 COMPATIBILITY TEST ELECTRIC: CPT

## 2018-07-04 RX ORDER — POTASSIUM CHLORIDE 7.45 MG/ML
10 INJECTION INTRAVENOUS
Status: COMPLETED | OUTPATIENT
Start: 2018-07-04 | End: 2018-07-04

## 2018-07-04 RX ORDER — SODIUM CHLORIDE 9 MG/ML
INJECTION, SOLUTION INTRAVENOUS
Status: ACTIVE
Start: 2018-07-04 | End: 2018-07-04

## 2018-07-04 RX ORDER — MAGNESIUM SULFATE HEPTAHYDRATE 40 MG/ML
4 INJECTION, SOLUTION INTRAVENOUS ONCE
Status: COMPLETED | OUTPATIENT
Start: 2018-07-04 | End: 2018-07-04

## 2018-07-04 RX ORDER — SODIUM CHLORIDE 9 MG/ML
INJECTION, SOLUTION INTRAVENOUS CONTINUOUS
Status: DISCONTINUED | OUTPATIENT
Start: 2018-07-04 | End: 2018-07-18 | Stop reason: HOSPADM

## 2018-07-04 RX ORDER — SODIUM CHLORIDE 9 MG/ML
INJECTION, SOLUTION INTRAVENOUS
Status: ACTIVE
Start: 2018-07-04 | End: 2018-07-05

## 2018-07-04 RX ADMIN — MORPHINE SULFATE 4 MG: 4 INJECTION INTRAVENOUS at 09:42

## 2018-07-04 RX ADMIN — POTASSIUM CHLORIDE 10 MEQ: 7.46 INJECTION, SOLUTION INTRAVENOUS at 11:56

## 2018-07-04 RX ADMIN — MORPHINE SULFATE 4 MG: 4 INJECTION INTRAVENOUS at 16:04

## 2018-07-04 RX ADMIN — POTASSIUM CHLORIDE 10 MEQ: 7.46 INJECTION, SOLUTION INTRAVENOUS at 13:01

## 2018-07-04 RX ADMIN — SODIUM CHLORIDE: 9 INJECTION, SOLUTION INTRAVENOUS at 12:27

## 2018-07-04 RX ADMIN — MORPHINE SULFATE 4 MG: 4 INJECTION INTRAVENOUS at 03:24

## 2018-07-04 RX ADMIN — POTASSIUM CHLORIDE: 2 INJECTION, SOLUTION, CONCENTRATE INTRAVENOUS at 20:23

## 2018-07-04 RX ADMIN — MAGNESIUM SULFATE IN WATER 4 G: 40 INJECTION, SOLUTION INTRAVENOUS at 12:12

## 2018-07-04 RX ADMIN — INSULIN HUMAN 2 UNITS: 100 INJECTION, SOLUTION PARENTERAL at 05:28

## 2018-07-04 RX ADMIN — MORPHINE SULFATE 4 MG: 4 INJECTION INTRAVENOUS at 13:03

## 2018-07-04 RX ADMIN — POTASSIUM CHLORIDE 10 MEQ: 7.46 INJECTION, SOLUTION INTRAVENOUS at 14:14

## 2018-07-04 RX ADMIN — ONDANSETRON 4 MG: 2 INJECTION INTRAMUSCULAR; INTRAVENOUS at 16:11

## 2018-07-04 ASSESSMENT — ENCOUNTER SYMPTOMS
BRUISES/BLEEDS EASILY: 0
PALPITATIONS: 0
NAUSEA: 0
LOSS OF CONSCIOUSNESS: 0
CONSTIPATION: 1
ABDOMINAL PAIN: 1
FOCAL WEAKNESS: 0
CONSTITUTIONAL NEGATIVE: 1
RESPIRATORY NEGATIVE: 1
DIAPHORESIS: 0
WHEEZING: 0
DIARRHEA: 0
NERVOUS/ANXIOUS: 0
DOUBLE VISION: 0
CHILLS: 0
PSYCHIATRIC NEGATIVE: 1
MUSCULOSKELETAL NEGATIVE: 1
DIZZINESS: 0
COUGH: 0
NEUROLOGICAL NEGATIVE: 1
FEVER: 0
SEIZURES: 0
HEMOPTYSIS: 0
BLOOD IN STOOL: 0
HEARTBURN: 0
VOMITING: 0
HEADACHES: 0
EYES NEGATIVE: 1
CARDIOVASCULAR NEGATIVE: 1

## 2018-07-04 ASSESSMENT — PAIN SCALES - GENERAL
PAINLEVEL_OUTOF10: 8
PAINLEVEL_OUTOF10: 4
PAINLEVEL_OUTOF10: 4
PAINLEVEL_OUTOF10: 7
PAINLEVEL_OUTOF10: 3
PAINLEVEL_OUTOF10: 3
PAINLEVEL_OUTOF10: 7

## 2018-07-04 NOTE — PROGRESS NOTES
Report received, poc discussed, assumed care of pt.   Call light in reach, hourly rounding in place.   Pt gets up x 1.   NPO diet. TPN running through RUE PICC. NGT to LCWS, - flatus. Distended.  + output from RLQ urostomy. LBM 6/29.   Morphine for pain.  No further needs.    MD at bedside, went over labs, pt a bit more tachy than yesterday. PRBCs ordered.

## 2018-07-04 NOTE — PROGRESS NOTES
[facility] Hospitalist Progress Note    Date of Service: 2018    Chief Complaint  83 y.o. male admitted 2018 with abdominal pain.    Interval Problem Update  Patient is an 83-year-old male who has recurrent abdominal obstruction. The patient underwent lysis of adhesion with exploratory laparotomy on 2018. Patient now again has small bowel obstruction and at this point is managed conservatively with NG tube suctioning, fluids and TPN for nutritional improvement. Patient will continue at this point with pain management as well. We are trying to avoid repeat surgery.    Consultants/Specialty  Surgery    Disposition  To be determined, he lives at home with his wife        Review of Systems   Constitutional: Negative.  Negative for chills, diaphoresis and fever.   HENT: Negative.    Eyes: Negative.  Negative for double vision.   Respiratory: Negative.  Negative for cough, hemoptysis and wheezing.    Cardiovascular: Negative.  Negative for chest pain, palpitations and leg swelling.   Gastrointestinal: Positive for abdominal pain and constipation. Negative for blood in stool, diarrhea, heartburn, nausea and vomiting.        NPO with NG to suction   Genitourinary: Negative.  Negative for frequency, hematuria and urgency.   Musculoskeletal: Negative.  Negative for joint pain.   Skin: Negative.  Negative for itching and rash.   Neurological: Negative.  Negative for dizziness, focal weakness, seizures, loss of consciousness and headaches.   Endo/Heme/Allergies: Negative.  Does not bruise/bleed easily.   Psychiatric/Behavioral: Negative.  Negative for suicidal ideas. The patient is not nervous/anxious.    All other systems reviewed and are negative.     Physical Exam  Laboratory/Imaging   Hemodynamics  Temp (24hrs), Av.6 °C (99.6 °F), Min:36.1 °C (97 °F), Max:38.4 °C (101.2 °F)   Temperature: 37.4 °C (99.3 °F)  Pulse  Av.4  Min: 41  Max: 125    Blood Pressure : 138/89      Respiratory      Respiration: 18,  Pulse Oximetry: 91 %, O2 Daily Delivery Respiratory : Silicone Nasal Cannula     Work Of Breathing / Effort: Mild  RUL Breath Sounds: Clear, RML Breath Sounds: Diminished, RLL Breath Sounds: Diminished, LEXA Breath Sounds: Clear, LLL Breath Sounds: Diminished    Fluids    Intake/Output Summary (Last 24 hours) at 07/04/18 1356  Last data filed at 07/04/18 1300   Gross per 24 hour   Intake             3176 ml   Output             2800 ml   Net              376 ml       Nutrition  Orders Placed This Encounter   Procedures   • Diet NPO     Standing Status:   Standing     Number of Occurrences:   1     Order Specific Question:   Restrict to:     Answer:   Ice Chips [2]     Physical Exam   Constitutional: He is oriented to person, place, and time. He appears well-developed and well-nourished.   HENT:   Head: Normocephalic and atraumatic.   Right Ear: External ear normal.   Left Ear: External ear normal.   Nose: Nose normal.   Mouth/Throat: Oropharynx is clear and moist.   Eyes: Conjunctivae and EOM are normal. Pupils are equal, round, and reactive to light.   Neck: Normal range of motion. Neck supple. No JVD present. No thyromegaly present.   Cardiovascular: Normal rate, regular rhythm, normal heart sounds and intact distal pulses.  Exam reveals no friction rub.    No murmur heard.  Pulmonary/Chest: Effort normal and breath sounds normal. He has no wheezes. He has no rales. He exhibits no tenderness.   Abdominal: Soft. He exhibits no distension. Bowel sounds are decreased. There is generalized tenderness. There is rebound and guarding.       Musculoskeletal: Normal range of motion. He exhibits no edema or tenderness.   Lymphadenopathy:     He has no cervical adenopathy.   Neurological: He is alert and oriented to person, place, and time. He has normal reflexes. No cranial nerve deficit.   Skin: Skin is warm and dry. No rash noted. No erythema.   Psychiatric: He has a normal mood and affect. His behavior is normal. Judgment  and thought content normal.   Nursing note and vitals reviewed.      Recent Labs      07/02/18 0335 07/03/18 0335  07/04/18   0334   WBC  3.4*  4.5*  6.5   RBC  3.24*  2.52*  2.54*   HEMOGLOBIN  9.7*  7.7*  7.6*   HEMATOCRIT  30.7*  23.7*  23.5*   MCV  94.1  94.0  92.5   MCH  30.2  30.6  29.9   MCHC  32.1*  32.5*  32.3*   RDW  58.0*  56.7*  55.2*   PLATELETCT  69*  109*  126*   MPV  9.6  10.3  9.3     Recent Labs      07/02/18   0335  07/03/18 0335 07/04/18   0334   SODIUM  142  139  142   POTASSIUM  3.7  3.4*  3.2*   CHLORIDE  111  107  106   CO2  26  27  30   GLUCOSE  110*  151*  133*   BUN  19  20  21   CREATININE  0.85  0.92  0.92   CALCIUM  8.6  8.2*  7.7*             Recent Labs      07/02/18   0335   TRIGLYCERIDE  121          Assessment/Plan     * SBO (small bowel obstruction) (HCC)- (present on admission)   Assessment & Plan    Patient has long course of small bowel obstruction without resolution and had exploratory laparotomy on 6/29/2018.  Patient had previous lysis of adhesion with small bowel resection.  Remains at this point nothing by mouth with suctioning to low intermittent canister. Patient is on TPN actively. Continuing pain management, for now conservative management to allow possible recovery.        Hypokalemia   Assessment & Plan    Patient's potassium today lower at 3.2 patient is receiving 3 K riders with the TPN having potassium in it as well. Repeat levels in the morning        Normocytic anemia- (present on admission)   Assessment & Plan    Patient's anemia has significantly worsened. The patient is now tachycardic, the hemoglobin is 7.6 at this point patient will be receiving 1 unit of packed red blood cells to correct current situation.        Renal insufficiency   Assessment & Plan    Renal functions are back to normal with a BUN of 21 a creatinine of 0.92        Severe protein-calorie malnutrition (HCC)- (present on admission)   Assessment & Plan    Patient has low albumin,  prealbumin, 14% weight loss, remains on TPN for full nutritional support.        T2DM (type 2 diabetes mellitus) (HCC)- (present on admission)   Assessment & Plan    -accus with sliding scale coverage   -diabetic diet on hold with TPN  -diabetic education  -follow glycohemoglobin levels long term, current like a hemoglobin A1c level is pending  -monitor for hypoglycemic episodes and adjust control if he should get low        HLD (hyperlipidemia)- (present on admission)   Assessment & Plan    Currently patient is not receiving any statins as the patient is nothing by mouth status.  Monitor long-term lipids  Lab Results   Component Value Date/Time    CHOLSTRLTOT 78 (L) 07/02/2018 03:35 AM    TRIGLYCERIDE 121 07/02/2018 03:35 AM              HTN (hypertension)- (present on admission)   Assessment & Plan    Patient currently being covered by IV labetalol as needed only for his blood pressure management with him being nothing by mouth.          Quality-Core Measures   Reviewed items::  EKG reviewed, Labs reviewed, Medications reviewed and Radiology images reviewed  Carter catheter::  Neurogenic Bladder  DVT prophylaxis pharmacological::  Enoxaparin (Lovenox)  Ulcer Prophylaxis::  Not indicated  Assessed for rehabilitation services:  Patient was assess for and/or received rehabilitation services during this hospitalization

## 2018-07-04 NOTE — CARE PLAN
Problem: Safety  Goal: Will remain free from injury  Outcome: PROGRESSING AS EXPECTED  Patient updated on POC. Bed in locked and lowest position with head of bed at 30 degrees. Call light and belongings within reach.    Problem: Urinary Elimination:  Goal: Ability to reestablish a normal urinary elimination pattern will improve  Outcome: PROGRESSING AS EXPECTED  Urostomy in place, draining into down drain bag. + urine output.

## 2018-07-04 NOTE — PROGRESS NOTES
Pharmacy TPN Day # 4       2018    Dosing Weight   67 kg  TPN currently providing 100% of goal      TPN goal: 1980-9534 kcal/day including 1.2-1.4 gm/kg/day Protein      TPN indication: SBO     Pertinent PMH: Patient has recent history of admission to @ [facility] x[facility] days for SBO - was discharged and had recurrence of abdominal pain with vomiting. With poor nutrition over preceding weeks, limited signs of current bowel function and anticipated poor enteral access in coming days, TPN initiated for nutritional support    Temp (24hrs), Av.6 °C (99.6 °F), Min:36.1 °C (97 °F), Max:38.4 °C (101.2 °F)  .  Recent Labs      18   0335  18   0335  18   0334   SODIUM  142  139  142   POTASSIUM  3.7  3.4*  3.2*   CHLORIDE  111  107  106   CO2  26  27  30   BUN  19  20  21   CREATININE  0.85  0.92  0.92   GLUCOSE  110*  151*  133*   CALCIUM  8.6  8.2*  7.7*   ASTSGOT  12  13   --    ALTSGPT  11  10   --    ALBUMIN  1.9*  2.0*   --    TBILIRUBIN  0.4  0.6   --    PHOSPHORUS  3.1  2.5  2.9   MAGNESIUM  1.6  1.6  1.4*     Accu-Checks  Recent Labs      18   1747  18   2347  18   0525   POCGLUCOSE  140*  148*  151*       Vitals:    18 0705 18 0947 18 1014 18 1032   BP: 138/91 151/83 136/79 146/89   Weight:       Height:           Intake/Output Summary (Last 24 hours) at 18 1109  Last data filed at 18 0830   Gross per 24 hour   Intake             2260 ml   Output             2800 ml   Net             -540 ml       Orders Placed This Encounter   Procedures   • Diet NPO     Standing Status:   Standing     Number of Occurrences:   1     Order Specific Question:   Restrict to:     Answer:   Ice Chips [2]         TPN for past 72 hours (Show up to 3 orders; newest on the left. Changes between the two most recent orders are indicated.)     Start date and time   20182018 2000      TPN Central Line Formulation [[id number]] TPN Central Line  Formulation [[id number]] TPN Central Line Formulation [[id number]]    Order Status  Active Last Dose in Progress Completed    Last Given   07/03/2018 2008 07/02/2018 2040       Base    Clinisol 15%  90 g 90 g 90 g    dextrose 70%  270 g 270 g 270 g    fat emulsions 20%  40 g 40 g 40 g       Additives    potassium phosphates  30 mmol 30 mmol 15 mmol    potassium chloride  60 mEq 40 mEq 40 mEq    sodium acetate  150 mEq 150 mEq 75 mEq    sodium chloride  150 mEq 150 mEq 75 mEq    calcium GLUConate  4.65 mEq 4.65 mEq 4.65 mEq    M.T.E. -5 Adult  1 mL 1 mL 1 mL    M.V.I. ADULT  10 mL 10 mL 10 mL    famotidine  40 mg 40 mg 40 mg    thiamine  100 mg 100 mg 100 mg    folic acid  1 mg 1 mg 1 mg       Energy Contribution    Proteins  -- -- --    Dextrose  -- -- --    Lipids  -- -- --    Total  0 kcal 0 kcal 0 kcal       Electrolyte Ion Calculated Amount    Sodium  -- -- --    Potassium  -- -- --    Calcium  -- -- --    Magnesium  -- -- --    Aluminum  -- -- --    Phosphate  -- -- --    Chloride  -- -- --    Acetate  -- -- --       Other    Total Protein  -- -- --    Total Protein/kg  -- -- --    Glucose Infusion Rate  -- -- --    Osmolarity  -- -- --    Volume  1,992 mL 1,992 mL 1,992 mL    Rate  83 mL/hr 83 mL/hr 83 mL/hr    Dosing Weight  66.8 kg 66.8 kg 66.8 kg    Infusion Site  Central Central Central            This formula provides:  % kcal as lipids = 30  Grams protein/kg = 1.3  Non-protein calories = 1318  Kcals/kg = 25  Total daily calories = 1678    Comments:  1) K+ low at 3.2 -- KCL 10 meq ivpb x 3 doses ordered per protocol and KCL will be increased by 20 meq in next TPN (will have 104 meq K+ between KPhos and KCl). Mag level 1.4 -- Mag 4 g ivpb ordered. We are currently unable to provide Mag in TPNs due to national shortages, but we are able to replete using mag riders outside of the TPN at this point in time.  2) TPN advanced to 100% nutritional goal with last night's bag.  3) Today will be day 3 of 3 of thiamine  "and folic acid (will remove from TPN on 7/5)  4) FSBS OK  5) per Surgery note: \"Awaiting bowel function. Continue NGT, NPO, TPN\"        Jesus Tracey PharmD        "

## 2018-07-04 NOTE — PROGRESS NOTES
"Surgical Progress Note:      Still no BM or flatus    PE:  /89   Pulse (!) 101   Temp 37.4 °C (99.3 °F)   Resp 18   Ht 1.854 m (6' 1\")   Wt 66.8 kg (147 lb 4.3 oz)   SpO2 91%   BMI 19.43 kg/m²     I/O:   Intake/Output Summary (Last 24 hours) at 07/04/18 1300  Last data filed at 07/04/18 1300   Gross per 24 hour   Intake             3176 ml   Output             2800 ml   Net              376 ml     UOP:  PO:  IV:    GEN: NAD  COR: RRR  PULM: CTA  ABD: soft, more distended      Labs:  Recent Labs      07/02/18   0335  07/03/18   0335  07/04/18   0334   WBC  3.4*  4.5*  6.5   RBC  3.24*  2.52*  2.54*   HEMOGLOBIN  9.7*  7.7*  7.6*   HEMATOCRIT  30.7*  23.7*  23.5*   MCV  94.1  94.0  92.5   MCH  30.2  30.6  29.9   RDW  58.0*  56.7*  55.2*   PLATELETCT  69*  109*  126*   MPV  9.6  10.3  9.3   NEUTSPOLYS  77.80*  82.90*  86.50*   LYMPHOCYTES  11.50*  9.00*  7.20*   MONOCYTES  7.70  5.40  4.50   EOSINOPHILS  0.90  0.90  0.90   BASOPHILS  0.30  0.00  0.00   RBCMORPHOLO   --   Present  Normal     Recent Labs      07/02/18   0335  07/03/18   0335  07/04/18   0334   SODIUM  142  139  142   POTASSIUM  3.7  3.4*  3.2*   CHLORIDE  111  107  106   CO2  26  27  30   GLUCOSE  110*  151*  133*   BUN  19  20  21         A/P: POD# 5  S/P ex lap bowel resection  Still significant ileus  Check abdomen films  Possible CT tomorrow  Continue TPN     Emanuel Bobby M.D.  [facility]  [phone]    "

## 2018-07-04 NOTE — CARE PLAN
Problem: Nutritional:  Goal: Achieve adequate nutritional intake  Patient will advance diet or start Nutrition Support   Outcome: PROGRESSING AS EXPECTED  TPN providing ~50% of pt's estimated needs     Intervention: Advance diet as tolerated  RD following

## 2018-07-04 NOTE — PROGRESS NOTES
Pt A&O x 4. Very fatigued at this time.     Pt rates pain 3 out of 10. Pt repositioned in bed.      Neuro: ONEAL. Denies new onset of numbness/ tingling.     Cardiac: Denies new onset of chest pain.     Vascular: Pulses 1+ BUE, BLE. No edema noted.     Respiratory: Lungs sound clear/diminished to auscultation. Pulling 1250 on IS, effective, strong effort. On 0.5L of O2 via silicone nasal cannula.  on, satting in 90's. Denies SOB.     GI: Abdomen distended, semi-firm, and tender. Hypoactive bowel sounds, - flatus, - BM. Denies nausea/ vomiting. Pt currently NPO with ice chips. NG tube to low suction in R nare, + dark green/brown output.     : RLQ urostomy to down drain bag. + clear yellow urine.      MSK: Pt up to bathroom with one assist, tolerating well.     Integumentary: RLQ urostomy, stoma is pink. Midline abdominal incison, island dressing in place, CDI, no drainage noted. NG tube to R nare, no skin breakdown noted. Mepilex in place over sacral area for prevention.       Labs noted. Hgb 7.7. Labs to be drawn in AM.     Fall precautions in place: Bed locked in lowest position, Upper bed rails up, treaded socks in place, personal belongings within reach, call light within reach, appropriate mobility signs in place, - bed alarm. Pt calls appropriately.      Pt updated on POC.

## 2018-07-05 ENCOUNTER — APPOINTMENT (OUTPATIENT)
Dept: RADIOLOGY | Facility: MEDICAL CENTER | Age: 83
DRG: 329 | End: 2018-07-05
Attending: SURGERY
Payer: MEDICARE

## 2018-07-05 LAB
ALBUMIN SERPL BCP-MCNC: 2 G/DL (ref 3.2–4.9)
ALBUMIN/GLOB SERPL: 0.7 G/DL
ALP SERPL-CCNC: 88 U/L (ref 30–99)
ALT SERPL-CCNC: 30 U/L (ref 2–50)
ANION GAP SERPL CALC-SCNC: 5 MMOL/L (ref 0–11.9)
AST SERPL-CCNC: 24 U/L (ref 12–45)
BILIRUB SERPL-MCNC: 1.4 MG/DL (ref 0.1–1.5)
BUN SERPL-MCNC: 22 MG/DL (ref 8–22)
CALCIUM SERPL-MCNC: 7.9 MG/DL (ref 8.5–10.5)
CHLORIDE SERPL-SCNC: 105 MMOL/L (ref 96–112)
CO2 SERPL-SCNC: 32 MMOL/L (ref 20–33)
CREAT SERPL-MCNC: 0.83 MG/DL (ref 0.5–1.4)
ERYTHROCYTE [DISTWIDTH] IN BLOOD BY AUTOMATED COUNT: 56.4 FL (ref 35.9–50)
GLOBULIN SER CALC-MCNC: 2.9 G/DL (ref 1.9–3.5)
GLUCOSE BLD-MCNC: 135 MG/DL (ref 65–99)
GLUCOSE BLD-MCNC: 152 MG/DL (ref 65–99)
GLUCOSE BLD-MCNC: 164 MG/DL (ref 65–99)
GLUCOSE SERPL-MCNC: 147 MG/DL (ref 65–99)
HCT VFR BLD AUTO: 27.2 % (ref 42–52)
HGB BLD-MCNC: 8.8 G/DL (ref 14–18)
MAGNESIUM SERPL-MCNC: 1.8 MG/DL (ref 1.5–2.5)
MCH RBC QN AUTO: 29.5 PG (ref 27–33)
MCHC RBC AUTO-ENTMCNC: 32.4 G/DL (ref 33.7–35.3)
MCV RBC AUTO: 91.3 FL (ref 81.4–97.8)
PHOSPHATE SERPL-MCNC: 3.2 MG/DL (ref 2.5–4.5)
PLATELET # BLD AUTO: 172 K/UL (ref 164–446)
PMV BLD AUTO: 9.9 FL (ref 9–12.9)
POTASSIUM SERPL-SCNC: 3.6 MMOL/L (ref 3.6–5.5)
PROT SERPL-MCNC: 4.9 G/DL (ref 6–8.2)
RBC # BLD AUTO: 2.98 M/UL (ref 4.7–6.1)
SODIUM SERPL-SCNC: 142 MMOL/L (ref 135–145)
WBC # BLD AUTO: 9.5 K/UL (ref 4.8–10.8)

## 2018-07-05 PROCEDURE — 99232 SBSQ HOSP IP/OBS MODERATE 35: CPT | Performed by: HOSPITALIST

## 2018-07-05 PROCEDURE — 700105 HCHG RX REV CODE 258: Performed by: HOSPITALIST

## 2018-07-05 PROCEDURE — 80053 COMPREHEN METABOLIC PANEL: CPT

## 2018-07-05 PROCEDURE — 700111 HCHG RX REV CODE 636 W/ 250 OVERRIDE (IP): Performed by: SURGERY

## 2018-07-05 PROCEDURE — 83735 ASSAY OF MAGNESIUM: CPT

## 2018-07-05 PROCEDURE — 700101 HCHG RX REV CODE 250: Performed by: HOSPITALIST

## 2018-07-05 PROCEDURE — 700105 HCHG RX REV CODE 258

## 2018-07-05 PROCEDURE — 82962 GLUCOSE BLOOD TEST: CPT | Mod: 91

## 2018-07-05 PROCEDURE — 84100 ASSAY OF PHOSPHORUS: CPT

## 2018-07-05 PROCEDURE — 85027 COMPLETE CBC AUTOMATED: CPT

## 2018-07-05 PROCEDURE — 770001 HCHG ROOM/CARE - MED/SURG/GYN PRIV*

## 2018-07-05 PROCEDURE — 700111 HCHG RX REV CODE 636 W/ 250 OVERRIDE (IP): Performed by: HOSPITALIST

## 2018-07-05 RX ORDER — MAGNESIUM SULFATE 1 G/100ML
1 INJECTION INTRAVENOUS
Status: COMPLETED | OUTPATIENT
Start: 2018-07-05 | End: 2018-07-05

## 2018-07-05 RX ORDER — SODIUM CHLORIDE 9 MG/ML
INJECTION, SOLUTION INTRAVENOUS
Status: COMPLETED
Start: 2018-07-05 | End: 2018-07-05

## 2018-07-05 RX ADMIN — MORPHINE SULFATE 4 MG: 4 INJECTION INTRAVENOUS at 07:48

## 2018-07-05 RX ADMIN — SODIUM CHLORIDE: 9 INJECTION, SOLUTION INTRAVENOUS at 21:30

## 2018-07-05 RX ADMIN — MORPHINE SULFATE 4 MG: 4 INJECTION INTRAVENOUS at 18:35

## 2018-07-05 RX ADMIN — POTASSIUM CHLORIDE: 2 INJECTION, SOLUTION, CONCENTRATE INTRAVENOUS at 21:29

## 2018-07-05 RX ADMIN — INSULIN HUMAN 2 UNITS: 100 INJECTION, SOLUTION PARENTERAL at 05:36

## 2018-07-05 RX ADMIN — INSULIN HUMAN 2 UNITS: 100 INJECTION, SOLUTION PARENTERAL at 17:03

## 2018-07-05 RX ADMIN — MORPHINE SULFATE 4 MG: 4 INJECTION INTRAVENOUS at 11:30

## 2018-07-05 RX ADMIN — ENOXAPARIN SODIUM 40 MG: 100 INJECTION SUBCUTANEOUS at 11:20

## 2018-07-05 RX ADMIN — MAGNESIUM SULFATE IN DEXTROSE 1 G: 10 INJECTION, SOLUTION INTRAVENOUS at 11:20

## 2018-07-05 RX ADMIN — MORPHINE SULFATE 4 MG: 4 INJECTION INTRAVENOUS at 04:15

## 2018-07-05 RX ADMIN — MAGNESIUM SULFATE IN DEXTROSE 1 G: 10 INJECTION, SOLUTION INTRAVENOUS at 12:56

## 2018-07-05 RX ADMIN — MORPHINE SULFATE 4 MG: 4 INJECTION INTRAVENOUS at 00:18

## 2018-07-05 RX ADMIN — MORPHINE SULFATE 4 MG: 4 INJECTION INTRAVENOUS at 14:28

## 2018-07-05 ASSESSMENT — ENCOUNTER SYMPTOMS
RESPIRATORY NEGATIVE: 1
CONSTIPATION: 1
BLURRED VISION: 0
SHORTNESS OF BREATH: 0
DEPRESSION: 0
INSOMNIA: 0
CARDIOVASCULAR NEGATIVE: 1
MYALGIAS: 0
NEUROLOGICAL NEGATIVE: 1
CLAUDICATION: 0
COUGH: 0
WEIGHT LOSS: 0
EYES NEGATIVE: 1
DIZZINESS: 0
FALLS: 0
BRUISES/BLEEDS EASILY: 0
STRIDOR: 0
NECK PAIN: 0
HEARTBURN: 0
ABDOMINAL PAIN: 1
POLYDIPSIA: 0
PND: 0
PSYCHIATRIC NEGATIVE: 1
SPEECH CHANGE: 0
CONSTITUTIONAL NEGATIVE: 1
SPUTUM PRODUCTION: 0
MUSCULOSKELETAL NEGATIVE: 1
PHOTOPHOBIA: 0

## 2018-07-05 ASSESSMENT — PAIN SCALES - GENERAL
PAINLEVEL_OUTOF10: 3
PAINLEVEL_OUTOF10: 3
PAINLEVEL_OUTOF10: 7
PAINLEVEL_OUTOF10: 7
PAINLEVEL_OUTOF10: 4
PAINLEVEL_OUTOF10: 9
PAINLEVEL_OUTOF10: 4
PAINLEVEL_OUTOF10: 5
PAINLEVEL_OUTOF10: ASSUMED PAIN PRESENT

## 2018-07-05 ASSESSMENT — LIFESTYLE VARIABLES: SUBSTANCE_ABUSE: 0

## 2018-07-05 NOTE — PROGRESS NOTES
Called Dr. Bobby with residuals from NGT after being clamped 4 hours: ~30 mL. - N/V.  Orders to remove NGT and pt okay to continue with ice chips and small sips of water.

## 2018-07-05 NOTE — PROGRESS NOTES
"Pt A&O x 4.     Vitals: /68   Pulse 97   Temp 37.2 °C (99 °F)   Resp 18   Ht 1.854 m (6' 1\")   Wt 66.8 kg (147 lb 4.3 oz)   SpO2 92%   BMI 19.43 kg/m²       Pt rates pain 4 out of 10. Declines additional interventions at this time.      Neuro: ONEAL. Denies new onset of numbness/ tingling.     Cardiac: Denies new onset of chest pain.     Vascular: Pulses 1+ BUE, BLE. No edema noted.     Respiratory: Lungs sound clear/diminished to auscultation with mucus in upper airway. On 2L of O2 via silicone nasal cannula.  on, satting in 90's. Denies SOB. Pt pulling 1000 on IS, strong effort. Patient has moderate thick sputum, is able to cough most of it up using bedside suction.      GI: Abdomen soft, tender. Hypoactive bowel sounds, + flatus, - BM, last BM 6/29/2018 prior to surgery. Denies nausea/ vomiting. Pt currently NPO with ice chips. NG tube to R nare, low suction, + thin dark green/brown output.     : Pt voiding adequately through RLQ urostomy into a down draining bag. Clear katharine output.     MSK: Pt up to bathroom with one assist, tolerating well. Pt refusing to mobilize at this time despite education.     Integumentary: RUE PICC line in place running TPN. Midline abdominal incision, approzimated with staples, CHALO, no drainage noted. RLQ urostomy.        Labs noted.     Fall precautions in place: Bed locked in lowest position, Upper bed rails up, treaded socks in place, personal belongings within reach, call light within reach, appropriate mobility signs in place, - bed alarm. Pt calls appropriately.      Pt updated on POC.  "

## 2018-07-05 NOTE — THERAPY
OT tx attempted. Pt reports he has been OOB 3x today and refused any OOB activity. Pt agreeable to try tomorrow. Will attempt back tomorrow as able.

## 2018-07-05 NOTE — PROGRESS NOTES
Pharmacy TPN Day # 5       2018    Dosing Weight   67 kg  TPN currently providing 100% of goal      TPN goal: 2105-4538 kcal/day including 1.2-1.4 gm/kg/day Protein      TPN indication: SBO      Pertinent PMH: Patient has recent history of admission to @ [facility] x[facility] days for SBO - was discharged and had recurrence of abdominal pain with vomiting. With poor nutrition over preceding weeks, limited signs of current bowel function and anticipated poor enteral access in coming days, TPN initiated for nutritional support    Temp (24hrs), Av.2 °C (99 °F), Min:36.8 °C (98.2 °F), Max:37.6 °C (99.7 °F)  .  Recent Labs      18   0335  18   0334  18   0400   SODIUM  139  142  142   POTASSIUM  3.4*  3.2*  3.6   CHLORIDE  107  106  105   CO2  27  30  32   BUN  20  21  22   CREATININE  0.92  0.92  0.83   GLUCOSE  151*  133*  147*   CALCIUM  8.2*  7.7*  7.9*   ASTSGOT  13   --   24   ALTSGPT  10   --   30   ALBUMIN  2.0*   --   2.0*   TBILIRUBIN  0.6   --   1.4   PHOSPHORUS  2.5  2.9  3.2   MAGNESIUM  1.6  1.4*  1.8     Accu-Checks  Recent Labs      18   1801  18   2328  18   0533   POCGLUCOSE  142*  150*  152*       Vitals:    18 0000 18 0400 18 0701   BP: 123/79 127/68 140/78 113/76   Weight:       Height:           Intake/Output Summary (Last 24 hours) at 18 1057  Last data filed at 18 0701   Gross per 24 hour   Intake             2020 ml   Output             1525 ml   Net              495 ml       Orders Placed This Encounter   Procedures   • Diet NPO     Standing Status:   Standing     Number of Occurrences:   1     Order Specific Question:   Restrict to:     Answer:   Ice Chips [2]         TPN for past 72 hours (Show up to 3 orders; newest on the left. Changes between the two most recent orders are indicated.)     Start date and time   2018      TPN Central Line Formulation [[id number]] TPN Central Line  "Formulation [[id number]] TPN Central Line Formulation [[id number]]    Order Status  Active Last Dose in Progress Completed    Last Given   07/04/2018 2023 07/03/2018 2008       Base    Clinisol 15%  90 g 90 g 90 g    dextrose 70%  270 g 270 g 270 g    fat emulsions 20%  40 g 40 g 40 g       Additives    potassium phosphates  30 mmol 30 mmol 30 mmol    potassium chloride  60 mEq 60 mEq 40 mEq    sodium acetate  100 mEq 150 mEq 150 mEq    sodium chloride  200 mEq 150 mEq 150 mEq    calcium GLUConate  4.65 mEq 4.65 mEq 4.65 mEq    M.T.E. -5 Adult  1 mL 1 mL 1 mL    M.V.I. ADULT  10 mL 10 mL 10 mL    famotidine  40 mg 40 mg 40 mg    thiamine  100 mg 100 mg 100 mg    folic acid  1 mg 1 mg 1 mg       Energy Contribution    Proteins  -- -- --    Dextrose  -- -- --    Lipids  -- -- --    Total  0 kcal 0 kcal 0 kcal       Electrolyte Ion Calculated Amount    Sodium  -- -- --    Potassium  -- -- --    Calcium  -- -- --    Magnesium  -- -- --    Aluminum  -- -- --    Phosphate  -- -- --    Chloride  -- -- --    Acetate  -- -- --       Other    Total Protein  -- -- --    Total Protein/kg  -- -- --    Glucose Infusion Rate  -- -- --    Osmolarity  -- -- --    Volume  1,992 mL 1,992 mL 1,992 mL    Rate  83 mL/hr 83 mL/hr 83 mL/hr    Dosing Weight  66.8 kg 66.8 kg 66.8 kg    Infusion Site  Central Central Central            This formula provides:  % kcal as lipids = 30  Grams protein/kg = 1.3  Non-protein calories = 1318  Kcals/kg = 25  Total daily calories = 1678    Comments:  1) K+ improved. Mag improved to 1.8, will give Mag 2 g ivpb today, mag iv shortage continues so will replete Mag outside of TPN. CO2 trending up, will adjust NaAcetate and NaCl.  2) TPN at 100% goal  3) Pt has had 3 days of thiamine and folic acid, will take those out of the TPN  4) FSBS 140-152  5) Per Surgery note:\"Starting to have some bowel function but still distended. Will trial NGT clamped today, possibly dc later\"      Jesus Tracey, PharmD        "

## 2018-07-05 NOTE — CARE PLAN
Problem: Safety  Goal: Will remain free from injury  Outcome: PROGRESSING AS EXPECTED  Pt calls appropriately for assistance. Educated pt on calling before mobilizing. Bed in locked and lowest position. Call light and belongings within reach.    Problem: Bowel/Gastric:  Goal: Normal bowel function is maintained or improved  Outcome: PROGRESSING SLOWER THAN EXPECTED   07/04/18 2037   OTHER   Last BM 06/29/18   Number of Times Stooled 0     Hypoactive bowel sounds, - flatus.

## 2018-07-05 NOTE — PROGRESS NOTES
Report received, poc discussed, assumed care of pt.   Call light in reach, hourly rounding in place.   Pt gets up x 1. Refusing at this time.   NPO diet. TPN running through [facility]. NGT to LCWS, replaced last night, + flatus. Distended, semi firm. - N/V  + output from RLQ urostomy. LBM 6/29.   Morphine for pain.  No further needs.

## 2018-07-05 NOTE — PROGRESS NOTES
NG tube found to be out of patient. Explained procedure to patient, patient verbalized understanding. NG tube replaced. Pt tolerated procedure without any complaints. Abd xray confirmed proper placement. NG tube returned to low continuous suction. Pt now resting comfortably.

## 2018-07-05 NOTE — PROGRESS NOTES
"Surgical Progress Note:      Finally passing some gas    PE:  /76   Pulse 98   Temp 37.1 °C (98.8 °F)   Resp 20   Ht 1.854 m (6' 1\")   Wt 66.8 kg (147 lb 4.3 oz)   SpO2 95%   BMI 19.43 kg/m²     I/O:   Intake/Output Summary (Last 24 hours) at 07/05/18 0835  Last data filed at 07/05/18 0701   Gross per 24 hour   Intake             2020 ml   Output             1525 ml   Net              495 ml     UOP:  PO:  IV:    GEN: NAD  COR: RRR  PULM: CTA  ABD: soft, distended but improved      Labs:  Recent Labs      07/03/18   0335  07/04/18   0334  07/05/18   0400   WBC  4.5*  6.5  9.5   RBC  2.52*  2.54*  2.98*   HEMOGLOBIN  7.7*  7.6*  8.8*   HEMATOCRIT  23.7*  23.5*  27.2*   MCV  94.0  92.5  91.3   MCH  30.6  29.9  29.5   RDW  56.7*  55.2*  56.4*   PLATELETCT  109*  126*  172   MPV  10.3  9.3  9.9   NEUTSPOLYS  82.90*  86.50*   --    LYMPHOCYTES  9.00*  7.20*   --    MONOCYTES  5.40  4.50   --    EOSINOPHILS  0.90  0.90   --    BASOPHILS  0.00  0.00   --    RBCMORPHOLO  Present  Normal   --      Recent Labs      07/03/18   0335  07/04/18   0334  07/05/18   0400   SODIUM  139  142  142   POTASSIUM  3.4*  3.2*  3.6   CHLORIDE  107  106  105   CO2  27  30  32   GLUCOSE  151*  133*  147*   BUN  20  21  22         A/P: POD# 6  S/P ex lap bowel resection  Starting to have some bowel function but still distended  Will trial NGT clamped today, possibly dc later   Possible referral to [facility]    Emanuel Bobby M.D.  [facility]  [phone]    "

## 2018-07-05 NOTE — PROGRESS NOTES
[facility] Hospitalist Progress Note    Date of Service: 2018    Chief Complaint  83 y.o. male admitted 2018 with abdominal pain.    Interval Problem Update  83-year-old male who recently underwent abdominal surgery with adhesion lysis for small bowel obstruction. Patient postoperatively was doing well but then had to return back to the hospital as again the abdominal obstruction recurred. The patient at this point is on TPN for nutritional support. He remains with an NG tube with suctioning. Continue to pain management. He is passing gas but has not had significant bowel movements. Surgery at this point is still contemplating whether to go back in or not.    Consultants/Specialty  Surgery    Disposition  To be determined, he lives at home with his wife        Review of Systems   Constitutional: Negative.  Negative for malaise/fatigue and weight loss.   HENT: Negative.  Negative for hearing loss.    Eyes: Negative.  Negative for blurred vision and photophobia.   Respiratory: Negative.  Negative for cough, sputum production, shortness of breath and stridor.    Cardiovascular: Negative.  Negative for chest pain, claudication and PND.   Gastrointestinal: Positive for abdominal pain and constipation (but passing gas). Negative for heartburn.        NPO with NG to suction   Genitourinary: Negative.  Negative for dysuria.   Musculoskeletal: Negative.  Negative for falls, myalgias and neck pain.   Skin: Negative.    Neurological: Negative.  Negative for dizziness and speech change.   Endo/Heme/Allergies: Negative.  Negative for polydipsia. Does not bruise/bleed easily.   Psychiatric/Behavioral: Negative.  Negative for depression, substance abuse and suicidal ideas. The patient does not have insomnia.    All other systems reviewed and are negative.     Physical Exam  Laboratory/Imaging   Hemodynamics  Temp (24hrs), Av.2 °C (98.9 °F), Min:36.8 °C (98.2 °F), Max:37.6 °C (99.7 °F)   Temperature: 37.1 °C (98.8  °F)  Pulse  Av.1  Min: 41  Max: 125    Blood Pressure : 113/76      Respiratory      Respiration: 20, Pulse Oximetry: 95 %     Work Of Breathing / Effort: (P) Shallow;Tachypnea;Mild  RUL Breath Sounds: (P) Clear, RML Breath Sounds: (P) Diminished, RLL Breath Sounds: (P) Diminished, LEXA Breath Sounds: (P) Clear, LLL Breath Sounds: (P) Diminished    Fluids    Intake/Output Summary (Last 24 hours) at 18 1301  Last data filed at 18 1200   Gross per 24 hour   Intake             1004 ml   Output             1900 ml   Net             -896 ml       Nutrition  Orders Placed This Encounter   Procedures   • Diet NPO     Standing Status:   Standing     Number of Occurrences:   1     Order Specific Question:   Restrict to:     Answer:   Ice Chips [2]     Physical Exam   Constitutional: He is oriented to person, place, and time. He appears well-developed and well-nourished. No distress.   HENT:   Head: Normocephalic and atraumatic.   Right Ear: External ear normal.   Left Ear: External ear normal.   Eyes: Conjunctivae and EOM are normal. Pupils are equal, round, and reactive to light. Right eye exhibits no discharge. Left eye exhibits no discharge.   Neck: Normal range of motion. Neck supple.   Cardiovascular: Normal rate, regular rhythm, normal heart sounds and intact distal pulses.    Pulmonary/Chest: Effort normal and breath sounds normal. No stridor. No respiratory distress. He has no wheezes. He has no rales.   Abdominal: Soft. Bowel sounds are decreased. There is generalized tenderness. There is rebound and guarding.       Musculoskeletal: Normal range of motion. He exhibits no edema or deformity.   Neurological: He is alert and oriented to person, place, and time. He has normal reflexes. He displays normal reflexes. Coordination normal.   Skin: Skin is warm and dry. He is not diaphoretic. No erythema. No pallor.   Psychiatric: He has a normal mood and affect. His behavior is normal. Judgment and thought  content normal.   Nursing note and vitals reviewed.      Recent Labs      07/03/18 0335 07/04/18   0334  07/05/18   0400   WBC  4.5*  6.5  9.5   RBC  2.52*  2.54*  2.98*   HEMOGLOBIN  7.7*  7.6*  8.8*   HEMATOCRIT  23.7*  23.5*  27.2*   MCV  94.0  92.5  91.3   MCH  30.6  29.9  29.5   MCHC  32.5*  32.3*  32.4*   RDW  56.7*  55.2*  56.4*   PLATELETCT  109*  126*  172   MPV  10.3  9.3  9.9     Recent Labs      07/03/18 0335 07/04/18   0334  07/05/18   0400   SODIUM  139  142  142   POTASSIUM  3.4*  3.2*  3.6   CHLORIDE  107  106  105   CO2  27  30  32   GLUCOSE  151*  133*  147*   BUN  20  21  22   CREATININE  0.92  0.92  0.83   CALCIUM  8.2*  7.7*  7.9*                      Assessment/Plan     * SBO (small bowel obstruction) (HCC)- (present on admission)   Assessment & Plan    Today patient's abdominal pain is about the same, he says is 5 out of 10 in intensity.  The patient says he has had a few events where he passed gas but still has not had a significant bowel movement.  Patient has had recent exploratory laparotomy on 6/29/2018 with lysis of adhesions and small bowel resection at the time.  Continue with pain management.  Continue with TPN  Continue at this point with surgical follow-ups          Hypokalemia   Assessment & Plan    Corrected after being given potassium IV yesterday he is up to 3.6 from 3.2 yesterday        Normocytic anemia- (present on admission)   Assessment & Plan    Monitor hemoglobin and hematocrit most recently is 8.8 27.2, up from yesterday with significant improvement.        Renal insufficiency   Assessment & Plan    Renal insufficiency has resolved continue at this point monitoring renal functions.        Severe protein-calorie malnutrition (HCC)- (present on admission)   Assessment & Plan    Currently patient remains on TPN. The patient at this point has severely low albumin levels, most recently 2.0, prealbumin levels were also low.        T2DM (type 2 diabetes mellitus) (HCC)-  (present on admission)   Assessment & Plan    -accus with sliding scale coverage , currently patient is not on long-acting insulin, most recent blood sugars range from 135-150.  -diabetic diet on hold with TPN continued  -diabetic education provided  -follow glycohemoglobin levels long term, hemoglobin A1c still pending.  -monitor for hypoglycemic episodes and adjust control if he should get low        HLD (hyperlipidemia)- (present on admission)   Assessment & Plan    Currently patient is not receiving any statins as the patient is nothing by mouth status.  Monitor long-term lipids, no changes in current treatment plan  Lab Results   Component Value Date/Time    CHOLSTRLTOT 78 (L) 07/02/2018 03:35 AM    TRIGLYCERIDE 121 07/02/2018 03:35 AM              HTN (hypertension)- (present on admission)   Assessment & Plan    Continued blood pressure management optimization          Quality-Core Measures   Reviewed items::  EKG reviewed, Labs reviewed, Medications reviewed and Radiology images reviewed  Carter catheter::  Neurogenic Bladder  DVT prophylaxis pharmacological::  Enoxaparin (Lovenox)  Ulcer Prophylaxis::  Not indicated  Assessed for rehabilitation services:  Patient was assess for and/or received rehabilitation services during this hospitalization

## 2018-07-06 LAB
ANION GAP SERPL CALC-SCNC: 6 MMOL/L (ref 0–11.9)
ANISOCYTOSIS BLD QL SMEAR: ABNORMAL
BASOPHILS # BLD AUTO: 0 % (ref 0–1.8)
BASOPHILS # BLD: 0 K/UL (ref 0–0.12)
BUN SERPL-MCNC: 25 MG/DL (ref 8–22)
CALCIUM SERPL-MCNC: 7.5 MG/DL (ref 8.5–10.5)
CHLORIDE SERPL-SCNC: 106 MMOL/L (ref 96–112)
CO2 SERPL-SCNC: 32 MMOL/L (ref 20–33)
CREAT SERPL-MCNC: 0.75 MG/DL (ref 0.5–1.4)
EOSINOPHIL # BLD AUTO: 0 K/UL (ref 0–0.51)
EOSINOPHIL NFR BLD: 0 % (ref 0–6.9)
ERYTHROCYTE [DISTWIDTH] IN BLOOD BY AUTOMATED COUNT: 57.3 FL (ref 35.9–50)
EST. AVERAGE GLUCOSE BLD GHB EST-MCNC: 137 MG/DL
GLUCOSE BLD-MCNC: 133 MG/DL (ref 65–99)
GLUCOSE BLD-MCNC: 146 MG/DL (ref 65–99)
GLUCOSE BLD-MCNC: 147 MG/DL (ref 65–99)
GLUCOSE SERPL-MCNC: 144 MG/DL (ref 65–99)
HBA1C MFR BLD: 6.4 % (ref 0–5.6)
HCT VFR BLD AUTO: 26.2 % (ref 42–52)
HGB BLD-MCNC: 8.5 G/DL (ref 14–18)
HYPOCHROMIA BLD QL SMEAR: ABNORMAL
LYMPHOCYTES # BLD AUTO: 0.36 K/UL (ref 1–4.8)
LYMPHOCYTES NFR BLD: 3.5 % (ref 22–41)
MACROCYTES BLD QL SMEAR: ABNORMAL
MANUAL DIFF BLD: NORMAL
MCH RBC QN AUTO: 29.7 PG (ref 27–33)
MCHC RBC AUTO-ENTMCNC: 32.4 G/DL (ref 33.7–35.3)
MCV RBC AUTO: 91.6 FL (ref 81.4–97.8)
MICROCYTES BLD QL SMEAR: ABNORMAL
MONOCYTES # BLD AUTO: 0.27 K/UL (ref 0–0.85)
MONOCYTES NFR BLD AUTO: 2.6 % (ref 0–13.4)
MORPHOLOGY BLD-IMP: NORMAL
MYELOCYTES NFR BLD MANUAL: 1.8 %
NEUTROPHILS # BLD AUTO: 9.49 K/UL (ref 1.82–7.42)
NEUTROPHILS NFR BLD: 90.3 % (ref 44–72)
NEUTS BAND NFR BLD MANUAL: 1.8 % (ref 0–10)
NRBC # BLD AUTO: 0 K/UL
NRBC BLD-RTO: 0 /100 WBC
OVALOCYTES BLD QL SMEAR: NORMAL
PHOSPHATE SERPL-MCNC: 2.9 MG/DL (ref 2.5–4.5)
PLATELET # BLD AUTO: 222 K/UL (ref 164–446)
PLATELET BLD QL SMEAR: NORMAL
PMV BLD AUTO: 9.9 FL (ref 9–12.9)
POIKILOCYTOSIS BLD QL SMEAR: NORMAL
POLYCHROMASIA BLD QL SMEAR: NORMAL
POTASSIUM SERPL-SCNC: 3.6 MMOL/L (ref 3.6–5.5)
RBC # BLD AUTO: 2.86 M/UL (ref 4.7–6.1)
RBC BLD AUTO: PRESENT
SODIUM SERPL-SCNC: 144 MMOL/L (ref 135–145)
WBC # BLD AUTO: 10.3 K/UL (ref 4.8–10.8)

## 2018-07-06 PROCEDURE — 700111 HCHG RX REV CODE 636 W/ 250 OVERRIDE (IP): Performed by: HOSPITALIST

## 2018-07-06 PROCEDURE — 99232 SBSQ HOSP IP/OBS MODERATE 35: CPT | Performed by: HOSPITALIST

## 2018-07-06 PROCEDURE — 97530 THERAPEUTIC ACTIVITIES: CPT

## 2018-07-06 PROCEDURE — 82962 GLUCOSE BLOOD TEST: CPT | Mod: 91

## 2018-07-06 PROCEDURE — 84100 ASSAY OF PHOSPHORUS: CPT

## 2018-07-06 PROCEDURE — 700101 HCHG RX REV CODE 250: Performed by: HOSPITALIST

## 2018-07-06 PROCEDURE — 80048 BASIC METABOLIC PNL TOTAL CA: CPT

## 2018-07-06 PROCEDURE — 700111 HCHG RX REV CODE 636 W/ 250 OVERRIDE (IP): Performed by: SURGERY

## 2018-07-06 PROCEDURE — 97535 SELF CARE MNGMENT TRAINING: CPT

## 2018-07-06 PROCEDURE — 85027 COMPLETE CBC AUTOMATED: CPT

## 2018-07-06 PROCEDURE — 83036 HEMOGLOBIN GLYCOSYLATED A1C: CPT

## 2018-07-06 PROCEDURE — 770001 HCHG ROOM/CARE - MED/SURG/GYN PRIV*

## 2018-07-06 PROCEDURE — 85007 BL SMEAR W/DIFF WBC COUNT: CPT

## 2018-07-06 PROCEDURE — 700105 HCHG RX REV CODE 258: Performed by: HOSPITALIST

## 2018-07-06 RX ORDER — MAGNESIUM SULFATE 1 G/100ML
1 INJECTION INTRAVENOUS ONCE
Status: COMPLETED | OUTPATIENT
Start: 2018-07-06 | End: 2018-07-06

## 2018-07-06 RX ADMIN — MORPHINE SULFATE 4 MG: 4 INJECTION INTRAVENOUS at 21:19

## 2018-07-06 RX ADMIN — MORPHINE SULFATE 4 MG: 4 INJECTION INTRAVENOUS at 17:23

## 2018-07-06 RX ADMIN — POTASSIUM CHLORIDE: 2 INJECTION, SOLUTION, CONCENTRATE INTRAVENOUS at 21:23

## 2018-07-06 RX ADMIN — MORPHINE SULFATE 4 MG: 4 INJECTION INTRAVENOUS at 14:21

## 2018-07-06 RX ADMIN — MORPHINE SULFATE 4 MG: 4 INJECTION INTRAVENOUS at 04:40

## 2018-07-06 RX ADMIN — MORPHINE SULFATE 4 MG: 4 INJECTION INTRAVENOUS at 07:43

## 2018-07-06 RX ADMIN — MAGNESIUM SULFATE 1 G: 1 INJECTION INTRAVENOUS at 11:58

## 2018-07-06 RX ADMIN — ENOXAPARIN SODIUM 40 MG: 100 INJECTION SUBCUTANEOUS at 07:43

## 2018-07-06 ASSESSMENT — ENCOUNTER SYMPTOMS
EYE DISCHARGE: 0
HEARTBURN: 0
SHORTNESS OF BREATH: 0
HEADACHES: 0
DEPRESSION: 0
POLYDIPSIA: 0
CARDIOVASCULAR NEGATIVE: 1
PSYCHIATRIC NEGATIVE: 1
SEIZURES: 0
ORTHOPNEA: 0
HEMOPTYSIS: 0
RESPIRATORY NEGATIVE: 1
DIARRHEA: 0
WEAKNESS: 1
MUSCULOSKELETAL NEGATIVE: 1
PALPITATIONS: 0
FOCAL WEAKNESS: 0
FEVER: 0
BACK PAIN: 0
EYES NEGATIVE: 1
DIZZINESS: 0
HALLUCINATIONS: 0
BRUISES/BLEEDS EASILY: 0
DIAPHORESIS: 0
ABDOMINAL PAIN: 1
EYE REDNESS: 0
SPUTUM PRODUCTION: 0
CONSTIPATION: 0

## 2018-07-06 ASSESSMENT — COGNITIVE AND FUNCTIONAL STATUS - GENERAL
SUGGESTED CMS G CODE MODIFIER DAILY ACTIVITY: CK
DRESSING REGULAR UPPER BODY CLOTHING: A LITTLE
DAILY ACTIVITIY SCORE: 16
DRESSING REGULAR LOWER BODY CLOTHING: A LOT
HELP NEEDED FOR BATHING: A LOT
TOILETING: A LITTLE
EATING MEALS: A LITTLE
PERSONAL GROOMING: A LITTLE

## 2018-07-06 ASSESSMENT — PAIN SCALES - GENERAL
PAINLEVEL_OUTOF10: 4
PAINLEVEL_OUTOF10: 6
PAINLEVEL_OUTOF10: 5
PAINLEVEL_OUTOF10: 6

## 2018-07-06 ASSESSMENT — LIFESTYLE VARIABLES: SUBSTANCE_ABUSE: 0

## 2018-07-06 NOTE — DISCHARGE PLANNING
Anticipated Discharge Disposition: Home with     Action: This RN CM spoke with pt at bedside regarding choice for SNF.  Per the pt him and his wife Stacey ([phone]) they do NOT want a SNF.  Per the wife they have been accepted by [facility]  and will be able to accommodate any needs the pt has upon discharge. [facility]  has accepted the pt.    In addition, this RN CM explained to Stacey the pt needs to participate in therapy (PT/OT) to improve his strength and be able to go home on a safe discharge.  The pt's wife completely understood and stated she would be here today to encourage her  to get up and walk.    Barriers to Discharge: none    Plan: Pending pt's medical clearance

## 2018-07-06 NOTE — PROGRESS NOTES
"Surgical Progress Note:      Passing some gas and had BMs   Feels better    PE:  /90   Pulse 92   Temp 37.3 °C (99.2 °F)   Resp (!) 22   Ht 1.854 m (6' 1\")   Wt 66.8 kg (147 lb 4.3 oz)   SpO2 92%   BMI 19.43 kg/m²     I/O:   Intake/Output Summary (Last 24 hours) at 07/06/18 0941  Last data filed at 07/06/18 0721   Gross per 24 hour   Intake             1104 ml   Output             1975 ml   Net             -871 ml     UOP:  PO:  IV:    GEN: NAD  COR: RRR  PULM: CTA  ABD: soft, NT, mild distention, improved      Labs:  Recent Labs      07/04/18   0334  07/05/18   0400  07/06/18   0510   WBC  6.5  9.5  10.3   RBC  2.54*  2.98*  2.86*   HEMOGLOBIN  7.6*  8.8*  8.5*   HEMATOCRIT  23.5*  27.2*  26.2*   MCV  92.5  91.3  91.6   MCH  29.9  29.5  29.7   RDW  55.2*  56.4*  57.3*   PLATELETCT  126*  172  222   MPV  9.3  9.9  9.9   NEUTSPOLYS  86.50*   --   90.30*   LYMPHOCYTES  7.20*   --   3.50*   MONOCYTES  4.50   --   2.60   EOSINOPHILS  0.90   --   0.00   BASOPHILS  0.00   --   0.00   RBCMORPHOLO  Normal   --   Present     Recent Labs      07/04/18   0334  07/05/18   0400  07/06/18   0445   SODIUM  142  142  144   POTASSIUM  3.2*  3.6  3.6   CHLORIDE  106  105  106   CO2  30  32  32   GLUCOSE  133*  147*  144*   BUN  21  22  25*         A/P:   S/P ex lap bowel resection  Ileus beginning to improve  Advance to clear liquids     Emanuel Bobby M.D.  [facility]  [phone]    "

## 2018-07-06 NOTE — CARE PLAN
Problem: Safety  Goal: Will remain free from injury  Outcome: PROGRESSING AS EXPECTED    Goal: Will remain free from falls  Outcome: PROGRESSING AS EXPECTED      Problem: Skin Integrity  Goal: Risk for impaired skin integrity will decrease  Outcome: PROGRESSING AS EXPECTED

## 2018-07-06 NOTE — PROGRESS NOTES
Pharmacy TPN Day # 6       2018    Dosing Weight   67 kg  TPN currently providing 100% of goal      TPN goal: 6041-2945 kcal/day including 1.2-1.4 gm/kg/day Protein      TPN indication: SBO      Pertinent PMH: Patient has recent history of [facility] for [facility]BO - was discharged and had recurrence of abdominal pain with vomiting. With poor nutrition over preceding weeks, limited signs of current bowel function and anticipated poor enteral access in coming days, TPN initiated for nutritional support    Temp (24hrs), Av °C (98.6 °F), Min:36.4 °C (97.6 °F), Max:37.3 °C (99.2 °F)  .  Recent Labs      18   0334  18   0400  18   0445   SODIUM  142  142  144   POTASSIUM  3.2*  3.6  3.6   CHLORIDE  106  105  106   CO2  30  32  32   BUN  21  22  25*   CREATININE  0.92  0.83  0.75   GLUCOSE  133*  147*  144*   CALCIUM  7.7*  7.9*  7.5*   ASTSGOT   --   24   --    ALTSGPT   --   30   --    ALBUMIN   --   2.0*   --    TBILIRUBIN   --   1.4   --    PHOSPHORUS  2.9  3.2  2.9   MAGNESIUM  1.4*  1.8   --      Accu-Checks  Recent Labs      18   1119  18   1659  18   0107   POCGLUCOSE  135*  164*  146*       Vitals:    18 1542 18 0400 18 0721   BP: 115/74 117/60 134/70 147/90   Weight:       Height:           Intake/Output Summary (Last 24 hours) at 18 1022  Last data filed at 18 0721   Gross per 24 hour   Intake             1104 ml   Output             1975 ml   Net             -871 ml       Orders Placed This Encounter   Procedures   • Diet Order Clear Liquids - No Red Foods     Standing Status:   Standing     Number of Occurrences:   1     Order Specific Question:   Diet:     Answer:   Clear Liquids - No Red Foods [12]         TPN for past 72 hours (Show up to 3 orders; newest on the left. Changes between the two most recent orders are indicated.)     Start date and time   2018      TPN  Central Line Formulation [[id number]] TPN Central Line Formulation [[id number]] TPN Central Line Formulation [[id number]]    Order Status  Active Completed Completed    Last Given  07/05/2018 2129 07/04/2018 2023 07/03/2018 2008       Base    Clinisol 15%  90 g 90 g 90 g    dextrose 70%  270 g 270 g 270 g    fat emulsions 20%  40 g 40 g 40 g       Additives    potassium phosphates  30 mmol 30 mmol 30 mmol    potassium chloride  60 mEq 60 mEq 40 mEq    sodium acetate  100 mEq 150 mEq 150 mEq    sodium chloride  200 mEq 150 mEq 150 mEq    calcium GLUConate  4.65 mEq 4.65 mEq 4.65 mEq    M.T.E. -5 Adult  1 mL 1 mL 1 mL    M.V.I. ADULT  10 mL 10 mL 10 mL    famotidine  40 mg 40 mg 40 mg    thiamine  -- 100 mg 100 mg    folic acid  -- 1 mg 1 mg       Energy Contribution    Proteins  -- -- --    Dextrose  -- -- --    Lipids  -- -- --    Total  0 kcal 0 kcal 0 kcal       Electrolyte Ion Calculated Amount    Sodium  -- -- --    Potassium  -- -- --    Calcium  -- -- --    Magnesium  -- -- --    Aluminum  -- -- --    Phosphate  -- -- --    Chloride  -- -- --    Acetate  -- -- --       Other    Total Protein  -- -- --    Total Protein/kg  -- -- --    Glucose Infusion Rate  -- -- --    Osmolarity  -- -- --    Volume  1,992 mL 1,992 mL 1,992 mL    Rate  83 mL/hr 83 mL/hr 83 mL/hr    Dosing Weight  66.8 kg 66.8 kg 66.8 kg    Infusion Site  Central Central Central            This formula provides:  % kcal as lipids = 30  Grams protein/kg = 1.3  Non-protein calories = 1318  Kcals/kg = 25  Total daily calories = 1678    Comments:  1) Electrolytes stable. Na+ at 144, will check BMP tomorrow and adjust Na+ in TPN if needed, currently TPN at NS equivalent. K+ stable, Phos OK. No Mag level today. Will give Mag 1 g ivpb today since there is a mag shortage and mag is not able to be placed in TPN formulations. Will check Mag level tomorrow.  2) TPN at 100% goal  3) FSBS 130-160's, 2 units SSI 2-9 needed in last 24 hrs  4) Per Surgery, pt  passing gas and having BM's, CLD diet ordered.   5) Will continue current TPN formulation      Jesus Tracey, PharmD

## 2018-07-06 NOTE — CARE PLAN
Problem: Bowel/Gastric:  Goal: Normal bowel function is maintained or improved  Outcome: PROGRESSING AS EXPECTED  Patient had BM today.    Problem: Mobility  Goal: Risk for activity intolerance will decrease  Outcome: PROGRESSING AS EXPECTED    Intervention: Assess and monitor signs of activity intolerance  Patient requires assistance of one and FWW, tolerates well. Has general weakness  Intervention: Encourage patient to increase activity level in collaboration with Interdisciplinary Team  RN, CNA, MD, and PT/OT all encouraging patient to participate in ADL's and move.

## 2018-07-06 NOTE — THERAPY
"Attempted PT treatment session this pm. Upon arrival pt reports \"I am terrible, I haven't had pain meds for hours.\" Pt declined working with PT at this time due to pain. RN notified. Will complete PT treatment session as able.   "

## 2018-07-06 NOTE — PROGRESS NOTES
[facility] Hospitalist Progress Note    Date of Service: 2018    Chief Complaint  83 y.o. male admitted 2018 with abdominal pain.    Interval Problem Update  Postoperative day #5 after exploratory laparotomy. Patient had lysis of adhesions. Patient small bowel was resected and at this point the small bowel obstruction is resolving. NG has been removed these are now allowed to have clear liquids. Continue with pain management. Continue with TPN now for nutritional support. Patient adamantly refuses any kind of skilled placement thus at this point will make to make arrangements for home with home health.    Consultants/Specialty  Surgery    Disposition  To be determined, he lives at home with his wife        Review of Systems   Constitutional: Negative for diaphoresis and fever.   HENT: Negative.  Negative for congestion and nosebleeds.    Eyes: Negative.  Negative for discharge and redness.   Respiratory: Negative.  Negative for hemoptysis, sputum production and shortness of breath.    Cardiovascular: Negative.  Negative for palpitations, orthopnea and leg swelling.   Gastrointestinal: Positive for abdominal pain (5/10). Negative for constipation, diarrhea and heartburn.        NPO with NG to suction   Genitourinary: Negative.  Negative for dysuria, frequency and hematuria.   Musculoskeletal: Negative.  Negative for back pain and joint pain.   Skin: Negative.  Negative for itching and rash.   Neurological: Positive for weakness. Negative for dizziness, focal weakness, seizures and headaches.   Endo/Heme/Allergies: Negative.  Negative for polydipsia. Does not bruise/bleed easily.   Psychiatric/Behavioral: Negative.  Negative for depression, hallucinations, substance abuse and suicidal ideas.   All other systems reviewed and are negative.     Physical Exam  Laboratory/Imaging   Hemodynamics  Temp (24hrs), Av °C (98.6 °F), Min:36.4 °C (97.6 °F), Max:37.3 °C (99.2 °F)   Temperature: 37.3 °C (99.2 °F)  Pulse   Av.5  Min: 41  Max: 125    Blood Pressure : 147/90      Respiratory      Respiration: (!) 22, Pulse Oximetry: 92 %     Work Of Breathing / Effort: Shallow;Mild  RUL Breath Sounds: Clear, RML Breath Sounds: Diminished, RLL Breath Sounds: Diminished, LEXA Breath Sounds: Clear, LLL Breath Sounds: Diminished    Fluids    Intake/Output Summary (Last 24 hours) at 18 1515  Last data filed at 18 1200   Gross per 24 hour   Intake             1476 ml   Output             1900 ml   Net             -424 ml       Nutrition  Orders Placed This Encounter   Procedures   • Diet Order Clear Liquids - No Red Foods     Standing Status:   Standing     Number of Occurrences:   1     Order Specific Question:   Diet:     Answer:   Clear Liquids - No Red Foods [12]     Physical Exam   Constitutional: He is oriented to person, place, and time. He appears well-developed and well-nourished. No distress.   HENT:   Head: Normocephalic and atraumatic.   Right Ear: External ear normal.   Left Ear: External ear normal.   Nose: Nose normal.   Mouth/Throat: Oropharynx is clear and moist. No oropharyngeal exudate.   Eyes: Conjunctivae and EOM are normal. Pupils are equal, round, and reactive to light.   Neck: Normal range of motion. Neck supple. No tracheal deviation present. No thyromegaly present.   Cardiovascular: Normal rate, regular rhythm, normal heart sounds and intact distal pulses.  Exam reveals no friction rub.    No murmur heard.  Pulmonary/Chest: Effort normal and breath sounds normal. No respiratory distress. He has no wheezes. He exhibits no tenderness.   Abdominal: Soft. He exhibits no distension. Bowel sounds are decreased. There is generalized tenderness. There is rebound and guarding.       Musculoskeletal: Normal range of motion.   Neurological: He is alert and oriented to person, place, and time. He has normal reflexes. No cranial nerve deficit. Coordination normal.   Skin: Skin is warm and dry. No rash noted. He is  not diaphoretic. No erythema.   Psychiatric: He has a normal mood and affect. His behavior is normal. Judgment and thought content normal.   Nursing note and vitals reviewed.      Recent Labs      07/04/18 0334 07/05/18   0400  07/06/18   0510   WBC  6.5  9.5  10.3   RBC  2.54*  2.98*  2.86*   HEMOGLOBIN  7.6*  8.8*  8.5*   HEMATOCRIT  23.5*  27.2*  26.2*   MCV  92.5  91.3  91.6   MCH  29.9  29.5  29.7   MCHC  32.3*  32.4*  32.4*   RDW  55.2*  56.4*  57.3*   PLATELETCT  126*  172  222   MPV  9.3  9.9  9.9     Recent Labs      07/04/18 0334 07/05/18   0400  07/06/18   0445   SODIUM  142  142  144   POTASSIUM  3.2*  3.6  3.6   CHLORIDE  106  105  106   CO2  30  32  32   GLUCOSE  133*  147*  144*   BUN  21  22  25*   CREATININE  0.92  0.83  0.75   CALCIUM  7.7*  7.9*  7.5*                      Assessment/Plan     * SBO (small bowel obstruction) (HCC)- (present on admission)   Assessment & Plan    Small bowel obstruction is starting to resolve.  NG tube at this point has been removed.  Patient is starting on clear liquid diet.  Continue for now with TPN for nutritional optimization  Surgery is at this point following I discussed the case with them.          Hypokalemia   Assessment & Plan    Monitor potassium levels at this point potassium has been corrected to 3.6 and at this point potassium correction will need to continue.        Normocytic anemia- (present on admission)   Assessment & Plan    Secondary to a combination of factors including acute disease process along with chronic disease process H&H at this point has been monitored and is stable currently does not need a transfusion.        Renal insufficiency   Assessment & Plan    Renal insufficiency most likely secondary to dehydration. At this point patient has been adequately hydrated and his renal functions are coming back under normal control.        Severe protein-calorie malnutrition (HCC)- (present on admission)   Assessment & Plan    Continue at  this point on TPN for nutritional optimization to a PICC line.        T2DM (type 2 diabetes mellitus) (HCC)- (present on admission)   Assessment & Plan    Continue with Accu-Cheks with sliding scale coverage. At this point the patient has been stable with his TPN and insulin in it.        HLD (hyperlipidemia)- (present on admission)   Assessment & Plan    At this point patient will be able to restart his statins as long as liver functions are normal  Lab Results   Component Value Date/Time    CHOLSTRLTOT 78 (L) 07/02/2018 03:35 AM    TRIGLYCERIDE 121 07/02/2018 03:35 AM              HTN (hypertension)- (present on admission)   Assessment & Plan    Patient has chronic essential hypertension. His blood pressure at this point has been borderline. Now that he is allowed to take orally we may be able to restart his home medications for blood pressure management. Most recent blood pressure 147/90          Quality-Core Measures   Reviewed items::  EKG reviewed, Labs reviewed, Medications reviewed and Radiology images reviewed  Carter catheter::  Neurogenic Bladder  DVT prophylaxis pharmacological::  Enoxaparin (Lovenox)  Ulcer Prophylaxis::  Not indicated  Assessed for rehabilitation services:  Patient returned to prior level of function, rehabilitation not indicated at this time

## 2018-07-06 NOTE — CARE PLAN
Problem: Nutritional:  Goal: Achieve adequate nutritional intake  Patient will advance diet or start Nutrition Support   Outcome: MET Date Met: 07/06/18  TPN @100% EEN.

## 2018-07-06 NOTE — PROGRESS NOTES
Assume pt care. Pt a/o x3, VSS, No acute issues overnight. Pt rounds Q1-2hr with assessment of 4p's. T/P [id number] of skin breakdown. Incontinent care provided with Q2h T/P or PRN. urostomy care given. IV assessment and care given. Drain care and assessment given. Pt education provided base on admission, care plan, current medications, and PRN. Pt has a RLQ urostomy. Right arm PICC x3, running KVO x2 and TPN. Pt is resting comfortably in bed, Q2h t/p encouraged. +flatus, -BM, +BS. Pt has hx DM, FS Q6h. Mid abd incision, CDI and well approximated.     Noted that NG was pulled today by day RN.     Disposition  To be determined, he lives at home with his wife    PLAN - gen sx  Possible referral to SNF

## 2018-07-06 NOTE — PROGRESS NOTES
Bedside report received.  Assessment complete.  A&O x 4. Patient calls appropriately.  Patient up with one assist and FWW.   Patient has 5/10 pain. Pain medication given  Denies N&V. Tolerating ice chips and TPN diet.  Assuring 2qh turns in place for skin protection  Surgical midline with staples is CHALO, CDI. RLQ ostomy is CDI  + void, + flatus  Patient denies SOB.  Patient has q6h fingersticks.  Review plan with of care with patient. Call light and personal belongings with in reach. Hourly rounding in place. All needs met at this time.

## 2018-07-06 NOTE — THERAPY
"Occupational Therapy Treatment completed with focus on ADLs, ADL transfers and patient education.  Functional Status:  Pt was seen for Occupational Therapy treatment today, see Therapy Kardex for details. Treatment included education in breath control with activity and at rest, self pacing techs and energy conservation for pain management. Educated pt in safety awareness techs as well. Psychosocial intervention addressed. Pt demo set up tray for self feeding. Pt slow moving but can feed self.Pt needed increased encouragement for attempting self care tasks. Pt demo SBA for bed mobility and supine to sit at EOB with HOB elevated. Min A for sit to stand with FWW due to several lines and O2 tubing. Min A for ambulation to BR with FWW. Pt demo only fair- general endurance needed for simple self care tasks. Pt fatigues quickly/easily. Pt demonstrated Min A for UB dressing, Min/Mod A for LB dressing with fatigue seated base. Min A for toilet transfers using FWW and grab bar. SBA for full toilet hygiene seated base. CGA for clothing management standing with fatigue. Pt stood at the sink for oral hygiene and grooming with CGA. Pt stated he was \"exhausted \" after attempting simple self care tasks.  Pt also demonstrated  CGA/Min A for Ambulating ADL's with FWW. Pt was left up in chair , call light in reach, bedside table in reach  and nursing is aware.  RN updated Recommend continued OT services prior to d/c home. Pt stated he wanted to go home and that his \"wife can care for me\". Will speak with SW. Pt 's wife may not be able to fully care for pt.Pt did give good effort, low activity tolerance, safety is only fair with fatigue.  Continue Occupational Therapy services as per plan.    Plan of Care: Will benefit from Occupational Therapy 3 times per week  Discharge Recommendations:  Equipment Will Continue to Assess for Equipment Needs. Post-acute therapy Discharge to a transitional care facility for continued skilled therapy " "services.    See \"Rehab Therapy-Acute\" Patient Summary Report for complete documentation.   "

## 2018-07-07 LAB
ANION GAP SERPL CALC-SCNC: 7 MMOL/L (ref 0–11.9)
BUN SERPL-MCNC: 24 MG/DL (ref 8–22)
CALCIUM SERPL-MCNC: 8.1 MG/DL (ref 8.5–10.5)
CHLORIDE SERPL-SCNC: 106 MMOL/L (ref 96–112)
CO2 SERPL-SCNC: 30 MMOL/L (ref 20–33)
CREAT SERPL-MCNC: 0.81 MG/DL (ref 0.5–1.4)
ERYTHROCYTE [DISTWIDTH] IN BLOOD BY AUTOMATED COUNT: 57.1 FL (ref 35.9–50)
GLUCOSE BLD-MCNC: 147 MG/DL (ref 65–99)
GLUCOSE BLD-MCNC: 147 MG/DL (ref 65–99)
GLUCOSE BLD-MCNC: 163 MG/DL (ref 65–99)
GLUCOSE SERPL-MCNC: 140 MG/DL (ref 65–99)
HCT VFR BLD AUTO: 26.2 % (ref 42–52)
HGB BLD-MCNC: 8.5 G/DL (ref 14–18)
MAGNESIUM SERPL-MCNC: 1.5 MG/DL (ref 1.5–2.5)
MCH RBC QN AUTO: 29.6 PG (ref 27–33)
MCHC RBC AUTO-ENTMCNC: 32.4 G/DL (ref 33.7–35.3)
MCV RBC AUTO: 91.3 FL (ref 81.4–97.8)
PHOSPHATE SERPL-MCNC: 3.5 MG/DL (ref 2.5–4.5)
PLATELET # BLD AUTO: 331 K/UL (ref 164–446)
PMV BLD AUTO: 10.3 FL (ref 9–12.9)
POTASSIUM SERPL-SCNC: 3.8 MMOL/L (ref 3.6–5.5)
RBC # BLD AUTO: 2.87 M/UL (ref 4.7–6.1)
SODIUM SERPL-SCNC: 143 MMOL/L (ref 135–145)
WBC # BLD AUTO: 10.7 K/UL (ref 4.8–10.8)

## 2018-07-07 PROCEDURE — 700111 HCHG RX REV CODE 636 W/ 250 OVERRIDE (IP): Performed by: HOSPITALIST

## 2018-07-07 PROCEDURE — 84100 ASSAY OF PHOSPHORUS: CPT

## 2018-07-07 PROCEDURE — 700111 HCHG RX REV CODE 636 W/ 250 OVERRIDE (IP): Performed by: SURGERY

## 2018-07-07 PROCEDURE — 85027 COMPLETE CBC AUTOMATED: CPT

## 2018-07-07 PROCEDURE — 80048 BASIC METABOLIC PNL TOTAL CA: CPT

## 2018-07-07 PROCEDURE — 770001 HCHG ROOM/CARE - MED/SURG/GYN PRIV*

## 2018-07-07 PROCEDURE — 700101 HCHG RX REV CODE 250: Performed by: HOSPITALIST

## 2018-07-07 PROCEDURE — 82962 GLUCOSE BLOOD TEST: CPT | Mod: 91

## 2018-07-07 PROCEDURE — 99232 SBSQ HOSP IP/OBS MODERATE 35: CPT | Performed by: HOSPITALIST

## 2018-07-07 PROCEDURE — 700105 HCHG RX REV CODE 258: Performed by: HOSPITALIST

## 2018-07-07 PROCEDURE — 83735 ASSAY OF MAGNESIUM: CPT

## 2018-07-07 RX ORDER — MAGNESIUM SULFATE HEPTAHYDRATE 40 MG/ML
4 INJECTION, SOLUTION INTRAVENOUS ONCE
Status: COMPLETED | OUTPATIENT
Start: 2018-07-07 | End: 2018-07-07

## 2018-07-07 RX ADMIN — SODIUM CHLORIDE: 9 INJECTION, SOLUTION INTRAVENOUS at 09:26

## 2018-07-07 RX ADMIN — MORPHINE SULFATE 4 MG: 4 INJECTION INTRAVENOUS at 11:41

## 2018-07-07 RX ADMIN — MAGNESIUM SULFATE IN WATER 4 G: 40 INJECTION, SOLUTION INTRAVENOUS at 09:26

## 2018-07-07 RX ADMIN — MORPHINE SULFATE 4 MG: 4 INJECTION INTRAVENOUS at 20:23

## 2018-07-07 RX ADMIN — MORPHINE SULFATE 4 MG: 4 INJECTION INTRAVENOUS at 08:41

## 2018-07-07 RX ADMIN — INSULIN HUMAN 2 UNITS: 100 INJECTION, SOLUTION PARENTERAL at 17:02

## 2018-07-07 RX ADMIN — MORPHINE SULFATE 4 MG: 4 INJECTION INTRAVENOUS at 05:07

## 2018-07-07 RX ADMIN — POTASSIUM CHLORIDE: 2 INJECTION, SOLUTION, CONCENTRATE INTRAVENOUS at 20:17

## 2018-07-07 RX ADMIN — ENOXAPARIN SODIUM 40 MG: 100 INJECTION SUBCUTANEOUS at 08:41

## 2018-07-07 ASSESSMENT — PAIN SCALES - GENERAL
PAINLEVEL_OUTOF10: 4
PAINLEVEL_OUTOF10: 4
PAINLEVEL_OUTOF10: 5
PAINLEVEL_OUTOF10: 6
PAINLEVEL_OUTOF10: 2
PAINLEVEL_OUTOF10: 3

## 2018-07-07 ASSESSMENT — ENCOUNTER SYMPTOMS
BLURRED VISION: 0
PHOTOPHOBIA: 0
RESPIRATORY NEGATIVE: 1
TINGLING: 0
SORE THROAT: 0
FALLS: 0
MEMORY LOSS: 0
CARDIOVASCULAR NEGATIVE: 1
CONSTIPATION: 1
CLAUDICATION: 0
MYALGIAS: 0
ABDOMINAL PAIN: 1
WEAKNESS: 1
MUSCULOSKELETAL NEGATIVE: 1
EYES NEGATIVE: 1
FLANK PAIN: 0
SPUTUM PRODUCTION: 0
SINUS PAIN: 0
TREMORS: 0
DEPRESSION: 0
HEARTBURN: 0
SHORTNESS OF BREATH: 0
PSYCHIATRIC NEGATIVE: 1
SENSORY CHANGE: 0
PND: 0
VOMITING: 0
WEIGHT LOSS: 0

## 2018-07-07 ASSESSMENT — LIFESTYLE VARIABLES: SUBSTANCE_ABUSE: 0

## 2018-07-07 NOTE — PROGRESS NOTES
Assume pt care. Pt a/o x3, VSS, No acute issues overnight. Pt rounds Q1-2hr with assessment of 4p's. T/P [id number] of skin breakdown. Incontinent care provided with Q2h T/P or PRN. urostomy care given. IV assessment and care given. Drain care and assessment given. Pt education provided base on admission, care plan, current medications, and PRN. Pt has a RLQ urostomy. Right arm PICC x3, running KVO x2 and TPN. Pt is resting comfortably in bed, Q2h t/p encouraged. +flatus, -BM, +BS. Pt has hx DM, FS Q6h. Mid abd incision, CDI and well approximated.      Noted that NG was pulled today by day RN.      Disposition  To be determined, he lives at home with his wife     PLAN - gen sx  Possible referral to SNF  Surgery is at this point following    DCP - Plan: Pending pt's medical clearance, opt for HH

## 2018-07-07 NOTE — PROGRESS NOTES
Bedside report received.  Assessment complete.  A&O x 4. Patient calls appropriately.  Patient up with one and FWW assist.   Patient has 6/10 pain. Pain medication given  Denies N&V. Tolerating clears, no reds diet.  Surgical abdomen incision is CHALO, CDI. RLQ urostomy is CDI  + void, + flatus  Patient denies SOB.  SCD's on.  Patient has no complaints at this time.  Review plan with of care with patient. Call light and personal belongings with in reach. Hourly rounding in place. All needs met at this time.

## 2018-07-07 NOTE — PROGRESS NOTES
Pharmacy TPN Day # 7       2018    Dosing Weight   67 kg             TPN currently providing 100% of goal                                                  TPN goal: 8245-3703 kcal/day including 1.2-1.4 gm/kg/day Protein                                                  TPN indication: SBO      Pertinent PMH: Patient has recent history of admission to @ [facility] x11 days for SBO - was discharged and had recurrence of abdominal pain with vomiting. With poor nutrition over preceding weeks, limited signs of current bowel function and anticipated poor enteral access in coming days, TPN initiated for nutritional support    Temp (24hrs), Av.2 °C (99 °F), Min:36.9 °C (98.5 °F), Max:37.4 °C (99.4 °F)  .  Recent Labs      18   0400  18   0445  18   0516   SODIUM  142  144  143   POTASSIUM  3.6  3.6  3.8   CHLORIDE  105  106  106   CO2  32  32  30   BUN  22  25*  24*   CREATININE  0.83  0.75  0.81   GLUCOSE  147*  144*  140*   CALCIUM  7.9*  7.5*  8.1*   ASTSGOT  24   --    --    ALTSGPT  30   --    --    ALBUMIN  2.0*   --    --    TBILIRUBIN  1.4   --    --    PHOSPHORUS  3.2  2.9  3.5   MAGNESIUM  1.8   --   1.5     Accu-Checks  Recent Labs      18   1720  18   0030  18   1137   POCGLUCOSE  147*  147*  147*       Vitals:    18 1636 18 1905 18 0435 18 0700   BP: 160/100 130/79 154/90 143/85   Weight:       Height:           Intake/Output Summary (Last 24 hours) at 18 1346  Last data filed at 18 1200   Gross per 24 hour   Intake             2436 ml   Output             2600 ml   Net             -164 ml       Orders Placed This Encounter   Procedures   • Diet Order Full Liquid     Standing Status:   Standing     Number of Occurrences:   1     Order Specific Question:   Diet:     Answer:   Full Liquid [11]         TPN for past 72 hours (Show up to 3 orders; newest on the left. Changes between the two most recent orders are indicated.)     Start date and  "time   07/05/2018 2000 07/04/2018 2000 07/03/2018 2000      TPN Central Line Formulation [[id number]] TPN Central Line Formulation [[id number]] TPN Central Line Formulation [[id number]]    Order Status  Active Completed Completed    Last Given  07/06/2018 2123 07/04/2018 2023 07/03/2018 2008       Base    Clinisol 15%  90 g 90 g 90 g    dextrose 70%  270 g 270 g 270 g    fat emulsions 20%  40 g 40 g 40 g       Additives    potassium phosphates  30 mmol 30 mmol 30 mmol    potassium chloride  60 mEq 60 mEq 40 mEq    sodium acetate  100 mEq 150 mEq 150 mEq    sodium chloride  200 mEq 150 mEq 150 mEq    calcium GLUConate  4.65 mEq 4.65 mEq 4.65 mEq    M.T.E. -5 Adult  1 mL 1 mL 1 mL    M.V.I. ADULT  10 mL 10 mL 10 mL    famotidine  40 mg 40 mg 40 mg    thiamine  -- 100 mg 100 mg    folic acid  -- 1 mg 1 mg       Energy Contribution    Proteins  -- -- --    Dextrose  -- -- --    Lipids  -- -- --    Total  0 kcal 0 kcal 0 kcal       Electrolyte Ion Calculated Amount    Sodium  -- -- --    Potassium  -- -- --    Calcium  -- -- --    Magnesium  -- -- --    Aluminum  -- -- --    Phosphate  -- -- --    Chloride  -- -- --    Acetate  -- -- --       Other    Total Protein  -- -- --    Total Protein/kg  -- -- --    Glucose Infusion Rate  -- -- --    Osmolarity  -- -- --    Volume  1,992 mL 1,992 mL 1,992 mL    Rate  83 mL/hr 83 mL/hr 83 mL/hr    Dosing Weight  66.8 kg 66.8 kg 66.8 kg    Infusion Site  Central Central Central            This formula provides:  % kcal as lipids = 30  Grams protein/kg = 1.3  Non-protein calories = 1318  Kcals/kg = 25  Total daily calories = 1678    Comments:  1) Electrolytes WNL. Gave Mag 4 g ivpb x 1 due to national mag iv shortage.  2) TPN at 100% goal. Srgery advanced diet to FLD. If pt tolerates FLD, then will look to decrease TPN to 50% of goal.  3) FSBS 130-140's  4) Per Surgery Note:\"Beginning to have bowel function, still not hungry\"  5) Continue current TPN formulation    Jesus Tracey, " PharmD

## 2018-07-07 NOTE — CARE PLAN
Problem: Knowledge Deficit  Goal: Knowledge of disease process/condition, treatment plan, diagnostic tests, and medications will improve  Outcome: PROGRESSING AS EXPECTED    Intervention: Explain information regarding disease process/condition, treatment plan, diagnostic tests, and medications and document in education  Patient updated on plan of care, verbalized understanding. No questions at this time      Problem: Pain Management  Goal: Pain level will decrease to patient's comfort goal  Outcome: PROGRESSING AS EXPECTED    Intervention: Follow pain managment plan developed in collaboration with patient and Interdisciplinary Team  Patient has IV morphine for pain which has been adequate pain control. Continuing to monitor  Intervention: Educate and implement non-pharmacologic comfort measures. Examples: relaxation, distration, play therapy, activity therapy, massage, etc.  Patient given extra pillows and ice packs. Repositions frequently

## 2018-07-07 NOTE — PROGRESS NOTES
"Surgical Progress Note:      Feels well  Tolerating clear liquids  flatus    PE:  /85   Pulse 98   Temp 36.9 °C (98.5 °F)   Resp 18   Ht 1.854 m (6' 1\")   Wt 66.8 kg (147 lb 4.3 oz)   SpO2 93%   BMI 19.43 kg/m²     I/O:   Intake/Output Summary (Last 24 hours) at 07/07/18 1117  Last data filed at 07/07/18 0800   Gross per 24 hour   Intake             2608 ml   Output             2400 ml   Net              208 ml     UOP:  PO:  IV:    GEN: NAD  COR: RRR  PULM: CTA  ABD: soft, NT, slight distention      Labs:  Recent Labs      07/05/18   0400  07/06/18   0510  07/07/18   0516   WBC  9.5  10.3  10.7   RBC  2.98*  2.86*  2.87*   HEMOGLOBIN  8.8*  8.5*  8.5*   HEMATOCRIT  27.2*  26.2*  26.2*   MCV  91.3  91.6  91.3   MCH  29.5  29.7  29.6   RDW  56.4*  57.3*  57.1*   PLATELETCT  172  222  331   MPV  9.9  9.9  10.3   NEUTSPOLYS   --   90.30*   --    LYMPHOCYTES   --   3.50*   --    MONOCYTES   --   2.60   --    EOSINOPHILS   --   0.00   --    BASOPHILS   --   0.00   --    RBCMORPHOLO   --   Present   --      Recent Labs      07/05/18   0400  07/06/18   0445  07/07/18   0516   SODIUM  142  144  143   POTASSIUM  3.6  3.6  3.8   CHLORIDE  105  106  106   CO2  32  32  30   GLUCOSE  147*  144*  140*   BUN  22  25*  24*         A/P:   S/P ex lap bowel resection  Beginning to have bowel function, still not hungry  Will try full liquids today  Continue TPN     Emanuel Bobby M.D.  [facility]  [phone]    "

## 2018-07-07 NOTE — PROGRESS NOTES
[facility] Hospitalist Progress Note    Date of Service: 2018    Chief Complaint  83 y.o. male admitted 2018 with abdominal pain.    Interval Problem Update  Postoperative day #7 after exploratory laparotomy with small bowel resection and adhesion lysis. The patient at this point is recovering well. He is starting to this point had passed gas and anticipate he will have a bowel movement soon. The patient continues on TPN for nutritional support for now. Continue at this point with pain management. Continue with nutritional support. Monitor blood sugars. Adjust insulin as needed.    Consultants/Specialty  Surgery    Disposition  To be determined, he lives at home with his wife        Review of Systems   Constitutional: Negative for weight loss.   HENT: Negative.  Negative for sinus pain and sore throat.    Eyes: Negative.  Negative for blurred vision and photophobia.   Respiratory: Negative.  Negative for sputum production and shortness of breath.    Cardiovascular: Negative.  Negative for chest pain, claudication and PND.   Gastrointestinal: Positive for abdominal pain and constipation. Negative for heartburn and vomiting.   Genitourinary: Negative.  Negative for dysuria and flank pain.   Musculoskeletal: Negative.  Negative for falls and myalgias.   Skin: Negative.    Neurological: Positive for weakness. Negative for tingling, tremors and sensory change.   Endo/Heme/Allergies: Negative.    Psychiatric/Behavioral: Negative.  Negative for depression, memory loss, substance abuse and suicidal ideas.      Physical Exam  Laboratory/Imaging   Hemodynamics  Temp (24hrs), Av.2 °C (99 °F), Min:36.9 °C (98.5 °F), Max:37.4 °C (99.4 °F)   Temperature: 36.9 °C (98.5 °F)  Pulse  Av.9  Min: 41  Max: 125    Blood Pressure : 143/85      Respiratory      Respiration: 18, Pulse Oximetry: 93 %     Work Of Breathing / Effort: Shallow;Mild  RUL Breath Sounds: Clear, RML Breath Sounds: Diminished, RLL Breath Sounds:  Diminished, LEXA Breath Sounds: Clear, LLL Breath Sounds: Diminished    Fluids    Intake/Output Summary (Last 24 hours) at 07/07/18 1429  Last data filed at 07/07/18 1200   Gross per 24 hour   Intake             2436 ml   Output             2600 ml   Net             -164 ml       Nutrition  Orders Placed This Encounter   Procedures   • Diet Order Full Liquid     Standing Status:   Standing     Number of Occurrences:   1     Order Specific Question:   Diet:     Answer:   Full Liquid [11]     Physical Exam   Constitutional: He is oriented to person, place, and time. He appears well-developed and well-nourished.   HENT:   Head: Normocephalic and atraumatic.   Right Ear: External ear normal.   Left Ear: External ear normal.   Eyes: Conjunctivae and EOM are normal. Pupils are equal, round, and reactive to light. Right eye exhibits no discharge. Left eye exhibits no discharge.   Neck: Normal range of motion. Neck supple. No JVD present.   Cardiovascular: Normal rate, regular rhythm, normal heart sounds and intact distal pulses.    Pulmonary/Chest: Effort normal and breath sounds normal. No respiratory distress.   Abdominal: Soft. Bowel sounds are decreased. There is generalized tenderness. There is rebound and guarding.       Musculoskeletal: Normal range of motion. He exhibits no edema.   Lymphadenopathy:     He has no cervical adenopathy.   Neurological: He is alert and oriented to person, place, and time. He has normal reflexes. He displays normal reflexes. No cranial nerve deficit. He exhibits normal muscle tone.   Skin: Skin is warm and dry. No erythema.   Psychiatric: He has a normal mood and affect. His behavior is normal. Judgment and thought content normal.   Nursing note and vitals reviewed.      Recent Labs      07/05/18   0400  07/06/18   0510  07/07/18   0516   WBC  9.5  10.3  10.7   RBC  2.98*  2.86*  2.87*   HEMOGLOBIN  8.8*  8.5*  8.5*   HEMATOCRIT  27.2*  26.2*  26.2*   MCV  91.3  91.6  91.3   MCH  29.5   29.7  29.6   MCHC  32.4*  32.4*  32.4*   RDW  56.4*  57.3*  57.1*   PLATELETCT  172  222  331   MPV  9.9  9.9  10.3     Recent Labs      07/05/18   0400  07/06/18   0445  07/07/18   0516   SODIUM  142  144  143   POTASSIUM  3.6  3.6  3.8   CHLORIDE  105  106  106   CO2  32  32  30   GLUCOSE  147*  144*  140*   BUN  22  25*  24*   CREATININE  0.83  0.75  0.81   CALCIUM  7.9*  7.5*  8.1*                      Assessment/Plan     * SBO (small bowel obstruction) ([facility])- (present on admission)   Assessment & Plan    Small bowel obstruction at this point continues to stagnate. The patient is passing gas but has not had a decent bowel movement. He continues to have some abdominal pain.  For now continue with TPN.  Patient may at this point advanced slowly on his diet is tolerating  Continue with pain management              Hypokalemia   Assessment & Plan    Monitor potassium levels most recently 3.8 and a need for adjustment at this point in supplementation        Normocytic anemia- (present on admission)   Assessment & Plan    Monitor H&H if drops below 7 or 21 over patient is unstable transfuse        Renal insufficiency   Assessment & Plan    Resolved the most recent BNP 22 creatinine 0.83        Severe protein-calorie malnutrition (HCC)- (present on admission)   Assessment & Plan    To help with the severe protein, malocclusion patient continues on TPN. Now that he's starting D the patient will need nutritional supplementation.        T2DM (type 2 diabetes mellitus) (HCC)- (present on admission)   Assessment & Plan    Continue at this point with insulin support especially since the patient is on TPN.        HLD (hyperlipidemia)- (present on admission)   Assessment & Plan    At this point patient will be able to restart his statins as long as liver functions are normal  Lab Results   Component Value Date/Time    CHOLSTRLTOT 78 (L) 07/02/2018 03:35 AM    TRIGLYCERIDE 121 07/02/2018 03:35 AM              HTN  (hypertension)- (present on admission)   Assessment & Plan    Continued on blood pressure management optimization keep systolic blood pressure under 140 diastolic under 90.  Most recent blood pressure 143/85.  Patient currently is on as needed labetalol at home he was on metoprolol XL 25 mg daily          Quality-Core Measures   Reviewed items::  EKG reviewed, Labs reviewed, Medications reviewed and Radiology images reviewed  Carter catheter::  Neurogenic Bladder  DVT prophylaxis pharmacological::  Enoxaparin (Lovenox)  Ulcer Prophylaxis::  Not indicated  Assessed for rehabilitation services:  Patient returned to prior level of function, rehabilitation not indicated at this time

## 2018-07-08 LAB
ANION GAP SERPL CALC-SCNC: 6 MMOL/L (ref 0–11.9)
BUN SERPL-MCNC: 24 MG/DL (ref 8–22)
CALCIUM SERPL-MCNC: 7.7 MG/DL (ref 8.5–10.5)
CHLORIDE SERPL-SCNC: 106 MMOL/L (ref 96–112)
CO2 SERPL-SCNC: 28 MMOL/L (ref 20–33)
CREAT SERPL-MCNC: 0.63 MG/DL (ref 0.5–1.4)
ERYTHROCYTE [DISTWIDTH] IN BLOOD BY AUTOMATED COUNT: 56.6 FL (ref 35.9–50)
GLUCOSE BLD-MCNC: 114 MG/DL (ref 65–99)
GLUCOSE BLD-MCNC: 121 MG/DL (ref 65–99)
GLUCOSE BLD-MCNC: 123 MG/DL (ref 65–99)
GLUCOSE SERPL-MCNC: 138 MG/DL (ref 65–99)
HCT VFR BLD AUTO: 23.9 % (ref 42–52)
HGB BLD-MCNC: 7.9 G/DL (ref 14–18)
MAGNESIUM SERPL-MCNC: 1.6 MG/DL (ref 1.5–2.5)
MCH RBC QN AUTO: 30.2 PG (ref 27–33)
MCHC RBC AUTO-ENTMCNC: 33.1 G/DL (ref 33.7–35.3)
MCV RBC AUTO: 91.2 FL (ref 81.4–97.8)
PHOSPHATE SERPL-MCNC: 3.6 MG/DL (ref 2.5–4.5)
PLATELET # BLD AUTO: 351 K/UL (ref 164–446)
PMV BLD AUTO: 9.8 FL (ref 9–12.9)
POTASSIUM SERPL-SCNC: 3.8 MMOL/L (ref 3.6–5.5)
RBC # BLD AUTO: 2.62 M/UL (ref 4.7–6.1)
SODIUM SERPL-SCNC: 140 MMOL/L (ref 135–145)
WBC # BLD AUTO: 11.5 K/UL (ref 4.8–10.8)

## 2018-07-08 PROCEDURE — A9270 NON-COVERED ITEM OR SERVICE: HCPCS | Performed by: SURGERY

## 2018-07-08 PROCEDURE — 99232 SBSQ HOSP IP/OBS MODERATE 35: CPT | Performed by: HOSPITALIST

## 2018-07-08 PROCEDURE — 770001 HCHG ROOM/CARE - MED/SURG/GYN PRIV*

## 2018-07-08 PROCEDURE — 700105 HCHG RX REV CODE 258: Performed by: HOSPITALIST

## 2018-07-08 PROCEDURE — 700112 HCHG RX REV CODE 229: Performed by: SURGERY

## 2018-07-08 PROCEDURE — 85027 COMPLETE CBC AUTOMATED: CPT

## 2018-07-08 PROCEDURE — 700111 HCHG RX REV CODE 636 W/ 250 OVERRIDE (IP): Performed by: SURGERY

## 2018-07-08 PROCEDURE — 700101 HCHG RX REV CODE 250: Performed by: HOSPITALIST

## 2018-07-08 PROCEDURE — 80048 BASIC METABOLIC PNL TOTAL CA: CPT

## 2018-07-08 PROCEDURE — 82962 GLUCOSE BLOOD TEST: CPT | Mod: 91

## 2018-07-08 PROCEDURE — 83735 ASSAY OF MAGNESIUM: CPT

## 2018-07-08 PROCEDURE — 84100 ASSAY OF PHOSPHORUS: CPT

## 2018-07-08 PROCEDURE — 700111 HCHG RX REV CODE 636 W/ 250 OVERRIDE (IP): Performed by: HOSPITALIST

## 2018-07-08 RX ORDER — DOCUSATE SODIUM 100 MG/1
100 CAPSULE, LIQUID FILLED ORAL 2 TIMES DAILY
Status: DISCONTINUED | OUTPATIENT
Start: 2018-07-08 | End: 2018-07-18 | Stop reason: HOSPADM

## 2018-07-08 RX ORDER — POLYETHYLENE GLYCOL 3350 17 G/17G
1 POWDER, FOR SOLUTION ORAL
Status: DISCONTINUED | OUTPATIENT
Start: 2018-07-08 | End: 2018-07-18 | Stop reason: HOSPADM

## 2018-07-08 RX ORDER — MAGNESIUM SULFATE HEPTAHYDRATE 40 MG/ML
4 INJECTION, SOLUTION INTRAVENOUS ONCE
Status: COMPLETED | OUTPATIENT
Start: 2018-07-08 | End: 2018-07-08

## 2018-07-08 RX ADMIN — DOCUSATE SODIUM 100 MG: 100 CAPSULE, LIQUID FILLED ORAL at 11:09

## 2018-07-08 RX ADMIN — MAGNESIUM SULFATE IN WATER 4 G: 40 INJECTION, SOLUTION INTRAVENOUS at 16:03

## 2018-07-08 RX ADMIN — MORPHINE SULFATE 4 MG: 4 INJECTION INTRAVENOUS at 02:59

## 2018-07-08 RX ADMIN — ENOXAPARIN SODIUM 40 MG: 100 INJECTION SUBCUTANEOUS at 07:56

## 2018-07-08 RX ADMIN — POTASSIUM CHLORIDE: 2 INJECTION, SOLUTION, CONCENTRATE INTRAVENOUS at 20:41

## 2018-07-08 ASSESSMENT — ENCOUNTER SYMPTOMS
FEVER: 0
COUGH: 0
ABDOMINAL PAIN: 1
NECK PAIN: 0
ORTHOPNEA: 0
DIZZINESS: 0
DEPRESSION: 0
PSYCHIATRIC NEGATIVE: 1
DIARRHEA: 0
CARDIOVASCULAR NEGATIVE: 1
SENSORY CHANGE: 0
NAUSEA: 0
FLANK PAIN: 0
MUSCULOSKELETAL NEGATIVE: 1
TREMORS: 0
PHOTOPHOBIA: 0
SPEECH CHANGE: 0
LOSS OF CONSCIOUSNESS: 0
BACK PAIN: 0
SPUTUM PRODUCTION: 0
CONSTIPATION: 1
RESPIRATORY NEGATIVE: 1
VOMITING: 0
SHORTNESS OF BREATH: 0
WEAKNESS: 1
HEADACHES: 0
EYES NEGATIVE: 1
MYALGIAS: 0
BLURRED VISION: 0
PALPITATIONS: 0

## 2018-07-08 ASSESSMENT — LIFESTYLE VARIABLES: SUBSTANCE_ABUSE: 0

## 2018-07-08 ASSESSMENT — PAIN SCALES - GENERAL
PAINLEVEL_OUTOF10: 4
PAINLEVEL_OUTOF10: 4

## 2018-07-08 NOTE — PROGRESS NOTES
Pharmacy TPN Day # 8       2018    Dosing Weight   67 kg TPN currently providing 100% of goal, reducing to 75% of goal tonight      TPN goal: 1675 kcal/day including 1.5 gm/kg/day Protein      TPN indication: SBO      Pertinent PMH: Patient has recent history of admission to @ [facility] x11 days for SBO - was discharged and had recurrence of abdominal pain with vomiting. With poor nutrition over preceding weeks, limited signs of current bowel function and anticipated poor enteral access in coming days, TPN initiated for nutritional support    Temp (24hrs), Av °C (98.6 °F), Min:36.2 °C (97.2 °F), Max:37.7 °C (99.8 °F)  .  Recent Labs      18   0445  18   0516  18   0545   SODIUM  144  143  140   POTASSIUM  3.6  3.8  3.8   CHLORIDE  106  106  106   CO2  32  30  28   BUN  25*  24*  24*   CREATININE  0.75  0.81  0.63   GLUCOSE  144*  140*  138*   CALCIUM  7.5*  8.1*  7.7*   PHOSPHORUS  2.9  3.5  3.6   MAGNESIUM   --   1.5  1.6     Accu-Checks  Recent Labs      18   1700  18   0002  18   1112   POCGLUCOSE  163*  121*  123*       Vitals:    18 1526 18 1950 18 0245 18 0808   BP: 143/88 125/61 147/71 141/82   Weight:       Height:           Intake/Output Summary (Last 24 hours) at 18 1429  Last data filed at 18 1200   Gross per 24 hour   Intake             2316 ml   Output             3125 ml   Net             -809 ml       Orders Placed This Encounter   Procedures   • Diet Order Regular     Standing Status:   Standing     Number of Occurrences:   1     Order Specific Question:   Diet:     Answer:   Regular [1]         TPN for past 72 hours (Show up to 3 orders; newest on the left. Changes between the two most recent orders are indicated.)     Start date and time   2018      TPN Central Line Formulation [[id number]] TPN Central Line Formulation [[id number]] TPN Central Line Formulation [[id number]]    Order  Status  Active Last Dose in Progress Completed    Last Given   07/07/2018 2017 07/04/2018 2023       Base    Clinisol 15%  75 g 90 g 90 g    dextrose 70%  166 g 270 g 270 g    fat emulsions 20%  39 g 40 g 40 g       Additives    potassium phosphates  30 mmol 30 mmol 30 mmol    potassium chloride  80 mEq 60 mEq 60 mEq    sodium acetate  100 mEq 100 mEq 150 mEq    sodium chloride  200 mEq 200 mEq 150 mEq    calcium GLUConate  4.65 mEq 4.65 mEq 4.65 mEq    M.T.E. -5 Adult  1 mL 1 mL 1 mL    M.V.I. ADULT  10 mL 10 mL 10 mL    famotidine  40 mg 40 mg 40 mg    thiamine  -- -- 100 mg    folic acid  -- -- 1 mg       Energy Contribution    Proteins  -- -- --    Dextrose  -- -- --    Lipids  -- -- --    Total  0 kcal 0 kcal 0 kcal       Electrolyte Ion Calculated Amount    Sodium  -- -- --    Potassium  -- -- --    Calcium  -- -- --    Magnesium  -- -- --    Aluminum  -- -- --    Phosphate  -- -- --    Chloride  -- -- --    Acetate  -- -- --       Other    Total Protein  -- -- --    Total Protein/kg  -- -- --    Glucose Infusion Rate  -- -- --    Osmolarity  -- -- --    Volume  1,992 mL 1,992 mL 1,992 mL    Rate  83 mL/hr 83 mL/hr 83 mL/hr    Dosing Weight  66.8 kg 66.8 kg 66.8 kg    Infusion Site  Central Central Central            This formula provides:  % kcal as lipids = 28  Grams protein/kg = 1.1  Non-protein calories = 954  Kcals/kg = 19  Total daily calories = 1254    Comments:  Surgery advanced the diet to regular today. Per Dr. Camarillo, he is not eating much at all (only 5 to 10% of his meals). Per Dr. Camarillo, the advance was so that the patient would have more options. I told him that I was reducing the TPN to 75% tonight and we will monitor for intake. KCl increased in the TPN and magnesium 4 gm to be given outside the TPN.      Berenice Moody, JorgeD.

## 2018-07-08 NOTE — PROGRESS NOTES
"Surgical Progress Note:      Somewhat apathetic this AM  Pain controlled  Passing gas, no BM yesterday    PE:  /82   Pulse 95   Temp 37.7 °C (99.8 °F)   Resp 18   Ht 1.854 m (6' 1\")   Wt 66.8 kg (147 lb 4.3 oz)   SpO2 94%   BMI 19.43 kg/m²     I/O:   Intake/Output Summary (Last 24 hours) at 07/08/18 0935  Last data filed at 07/08/18 0808   Gross per 24 hour   Intake             2536 ml   Output             3025 ml   Net             -489 ml     UOP:  PO:  IV:    GEN: NAD  COR: RRR  PULM: CTA  ABD: soft, mild distention, stable, incision intact      Labs:  Recent Labs      07/06/18   0510  07/07/18   0516  07/08/18   0545   WBC  10.3  10.7  11.5*   RBC  2.86*  2.87*  2.62*   HEMOGLOBIN  8.5*  8.5*  7.9*   HEMATOCRIT  26.2*  26.2*  23.9*   MCV  91.6  91.3  91.2   MCH  29.7  29.6  30.2   RDW  57.3*  57.1*  56.6*   PLATELETCT  222  331  351   MPV  9.9  10.3  9.8   NEUTSPOLYS  90.30*   --    --    LYMPHOCYTES  3.50*   --    --    MONOCYTES  2.60   --    --    EOSINOPHILS  0.00   --    --    BASOPHILS  0.00   --    --    RBCMORPHOLO  Present   --    --      Recent Labs      07/06/18   0445  07/07/18   0516  07/08/18   0545   SODIUM  144  143  140   POTASSIUM  3.6  3.8  3.8   CHLORIDE  106  106  106   CO2  32  30  28   GLUCOSE  144*  140*  138*   BUN  25*  24*  24*         A/P:   S/P ex lap bowel resection  Advance to regular diet  Continue TPN for now until tolerating increased intake  Bowel regimen     Emanuel Bobby M.D.  [facility]  [phone]    "

## 2018-07-08 NOTE — PROGRESS NOTES
Bedside report received.  Assessment complete.  A&O x 4. Patient calls appropriately.  Patient up with one assist, FWW.   Patient has 4/10 pain. Declines pain medication at this time  Denies N&V. Tolerating full liquid and TPN diet.  Surgical midline to abdomen is CHALO with staples and CDI. RLQ urostomy is CDI  + void, + flatus  Patient denies SOB.  SCD's on.  Patient has no complaints or concerns at this time.  Review plan with of care with patient. Call light and personal belongings with in reach. Hourly rounding in place. All needs met at this time.

## 2018-07-08 NOTE — PROGRESS NOTES
Assume pt care. Pt a/o x3, VSS, No acute issues overnight. Pt rounds Q1-2hr with assessment of 4p's. T/P B9ozein of skin breakdown. Incontinent care provided with Q2h T/P or PRN. urostomy care given. IV assessment and care given. Drain care and assessment given. Pt education provided base on admission, care plan, current medications, and PRN. Pt has a RLQ urostomy. Right arm PICC x3, running KVO x2 and TPN. Pt is resting comfortably in bed, Q2h t/p encouraged. +flatus, -BM, +BS. Pt has hx DM, FS Q6h. Mid abd incision, CDI and well approximated.     POD #7  Cont to monitor    Disposition  To be determined, he lives at home with his wife     PLAN - gen sx  Possible referral to SNF  Surgery is at this point following     DCP - Plan: Pending pt's medical clearance, opt for HH

## 2018-07-08 NOTE — CARE PLAN
Problem: Pain Management  Goal: Pain level will decrease to patient's comfort goal  Outcome: PROGRESSING AS EXPECTED    Intervention: Follow pain managment plan developed in collaboration with patient and Interdisciplinary Team  Patient has had no complaints of pain so far throughout shift. Will continue to monitor. PRN Morphine available      Problem: Mobility  Goal: Risk for activity intolerance will decrease  Outcome: PROGRESSING AS EXPECTED    Intervention: Encourage patient to increase activity level in collaboration with Interdisciplinary Team  Patient encouraged to go on at least 3 walks a day. Patient is often resistant and refuses. RN, CNA, MD, and wife all encouraging frequently

## 2018-07-08 NOTE — PROGRESS NOTES
[facility] Hospitalist Progress Note    Date of Service: 7/8/2018    Chief Complaint  83 y.o. male admitted 6/26/2018 with abdominal pain.    Interval Problem Update  Postoperative day #8 after exploratory laparotomy.  Patient this morning is passing gas but he still has not had a bowel movement.  Remains on TPN for nutritional support.  Patient's pain is improved he is off the pain medications and is only 4 out of 10 in intensity.  Patient at this point will need continuation of his diet.  He is on a full liquid diet we anticipate he will have a bowel movement shortly.  Patient does not wish to go to a skilled facility he wishes to go home with his wife.  He will need PT and OT evaluations and possible home therapy.    Consultants/Specialty  Surgery    Disposition  To be determined, he lives at home with his wife        Review of Systems   Constitutional: Negative for fever and malaise/fatigue.   HENT: Negative.  Negative for ear pain, hearing loss and tinnitus.    Eyes: Negative.  Negative for blurred vision and photophobia.   Respiratory: Negative.  Negative for cough, sputum production and shortness of breath.    Cardiovascular: Negative.  Negative for chest pain, palpitations and orthopnea.   Gastrointestinal: Positive for abdominal pain and constipation. Negative for diarrhea, nausea and vomiting.        Passing gas, pain 4/10 but off the pain medications   Genitourinary: Negative.  Negative for dysuria and flank pain.   Musculoskeletal: Negative.  Negative for back pain, myalgias and neck pain.   Skin: Negative.  Negative for itching and rash.   Neurological: Positive for weakness. Negative for dizziness, tremors, sensory change, speech change, loss of consciousness and headaches.   Endo/Heme/Allergies: Negative.    Psychiatric/Behavioral: Negative.  Negative for depression, substance abuse and suicidal ideas.   All other systems reviewed and are negative.     Physical Exam  Laboratory/Imaging    Hemodynamics  Temp (24hrs), Av.8 °C (98.3 °F), Min:36.2 °C (97.2 °F), Max:37.1 °C (98.7 °F)   Temperature: 37 °C (98.6 °F)  Pulse  Av.2  Min: 41  Max: 125    Blood Pressure : 147/71      Respiratory      Respiration: 18, Pulse Oximetry: 93 %     Work Of Breathing / Effort: Shallow  RUL Breath Sounds: Clear, RML Breath Sounds: Diminished, RLL Breath Sounds: Diminished, LEXA Breath Sounds: Clear, LLL Breath Sounds: Diminished    Fluids    Intake/Output Summary (Last 24 hours) at 18 0656  Last data filed at 18 0245   Gross per 24 hour   Intake             1400 ml   Output             2825 ml   Net            -1425 ml       Nutrition  Orders Placed This Encounter   Procedures   • Diet Order Full Liquid     Standing Status:   Standing     Number of Occurrences:   1     Order Specific Question:   Diet:     Answer:   Full Liquid [11]     Physical Exam   Constitutional: He is oriented to person, place, and time. He appears well-developed and well-nourished. No distress.   HENT:   Head: Normocephalic and atraumatic.   Right Ear: External ear normal.   Left Ear: External ear normal.   Eyes: Conjunctivae and EOM are normal. Pupils are equal, round, and reactive to light.   Neck: Normal range of motion. Neck supple. No tracheal deviation present. No thyromegaly present.   Cardiovascular: Normal rate, regular rhythm, normal heart sounds and intact distal pulses.  Exam reveals no friction rub.    No murmur heard.  Pulmonary/Chest: Effort normal and breath sounds normal. No respiratory distress. He has no wheezes. He has no rales.   Abdominal: Soft. He exhibits no distension. Bowel sounds are decreased. There is generalized tenderness. There is rebound and guarding.       Musculoskeletal: Normal range of motion. He exhibits no edema or deformity.   Neurological: He is alert and oriented to person, place, and time. He has normal reflexes. No cranial nerve deficit. Coordination normal.   Skin: Skin is warm and  dry. No rash noted. He is not diaphoretic. No erythema.   Psychiatric: He has a normal mood and affect. His behavior is normal. Judgment and thought content normal.   Nursing note and vitals reviewed.      Recent Labs      07/06/18   0510  07/07/18   0516  07/08/18   0545   WBC  10.3  10.7  11.5*   RBC  2.86*  2.87*  2.62*   HEMOGLOBIN  8.5*  8.5*  7.9*   HEMATOCRIT  26.2*  26.2*  23.9*   MCV  91.6  91.3  91.2   MCH  29.7  29.6  30.2   MCHC  32.4*  32.4*  33.1*   RDW  57.3*  57.1*  56.6*   PLATELETCT  222  331  351   MPV  9.9  10.3  9.8     Recent Labs      07/06/18   0445  07/07/18   0516  07/08/18   0545   SODIUM  144  143  140   POTASSIUM  3.6  3.8  3.8   CHLORIDE  106  106  106   CO2  32  30  28   GLUCOSE  144*  140*  138*   BUN  25*  24*  24*   CREATININE  0.75  0.81  0.63   CALCIUM  7.5*  8.1*  7.7*                      Assessment/Plan     * SBO (small bowel obstruction) (HCC)- (present on admission)   Assessment & Plan    Postoperative day #8 after exploratory laparotomy.  Continue TPN for full nutritional support.  Has not had a bowel movement so far.  Passing gas.  Pain 4 out of 10 off pain medications.              Hypokalemia   Assessment & Plan    Continue to monitor potassium levels most recent potassium is stable at 3.8.        Normocytic anemia- (present on admission)   Assessment & Plan    At this point hemoglobin hematocrit continues to trend down.  Hemoglobin this morning is down 7.9 with hematocrit 23.9 at this point no transfusion indicated.        Renal insufficiency   Assessment & Plan    Renal insufficiency has resolved at this point BUN and creatinine are within normal limits        Severe protein-calorie malnutrition (HCC)- (present on admission)   Assessment & Plan    Continue at this point with TPN.  Monitor albumin and prealbumin.  Continue at this point with full liquid diet advance if tolerating        T2DM (type 2 diabetes mellitus) (HCC)- (present on admission)   Assessment & Plan     Patient at this point is on TPN and we are able to put the insulin into the TPN.  Most recent blood sugars are 123-161        HLD (hyperlipidemia)- (present on admission)   Assessment & Plan    At this point patient will be able to restart his statins as long as liver functions are normal  Lab Results   Component Value Date/Time    CHOLSTRLTOT 78 (L) 07/02/2018 03:35 AM    TRIGLYCERIDE 121 07/02/2018 03:35 AM              HTN (hypertension)- (present on admission)   Assessment & Plan    Blood pressure management continued at this point most recent blood pressure is 147/71  Patient was on metoprolol XL 25 mill grams daily at home here he is only on as needed labetalol.          Quality-Core Measures   Reviewed items::  EKG reviewed, Labs reviewed, Medications reviewed and Radiology images reviewed  Carter catheter::  Neurogenic Bladder  DVT prophylaxis pharmacological::  Enoxaparin (Lovenox)  Ulcer Prophylaxis::  Not indicated  Assessed for rehabilitation services:  Patient returned to prior level of function, rehabilitation not indicated at this time

## 2018-07-08 NOTE — CARE PLAN
Problem: Safety  Goal: Will remain free from injury  Outcome: PROGRESSING AS EXPECTED    Goal: Will remain free from falls  Outcome: PROGRESSING AS EXPECTED      Problem: Bowel/Gastric:  Goal: Normal bowel function is maintained or improved  Outcome: PROGRESSING SLOWER THAN EXPECTED    Goal: Will not experience complications related to bowel motility  Outcome: PROGRESSING SLOWER THAN EXPECTED

## 2018-07-09 LAB
ALBUMIN SERPL BCP-MCNC: 1.9 G/DL (ref 3.2–4.9)
ALBUMIN/GLOB SERPL: 0.6 G/DL
ALP SERPL-CCNC: 176 U/L (ref 30–99)
ALT SERPL-CCNC: 103 U/L (ref 2–50)
ANION GAP SERPL CALC-SCNC: 5 MMOL/L (ref 0–11.9)
AST SERPL-CCNC: 80 U/L (ref 12–45)
BILIRUB SERPL-MCNC: 0.9 MG/DL (ref 0.1–1.5)
BUN SERPL-MCNC: 24 MG/DL (ref 8–22)
CALCIUM SERPL-MCNC: 7.6 MG/DL (ref 8.5–10.5)
CHLORIDE SERPL-SCNC: 107 MMOL/L (ref 96–112)
CO2 SERPL-SCNC: 27 MMOL/L (ref 20–33)
CREAT SERPL-MCNC: 0.74 MG/DL (ref 0.5–1.4)
ERYTHROCYTE [DISTWIDTH] IN BLOOD BY AUTOMATED COUNT: 58 FL (ref 35.9–50)
GLOBULIN SER CALC-MCNC: 3 G/DL (ref 1.9–3.5)
GLUCOSE BLD-MCNC: 115 MG/DL (ref 65–99)
GLUCOSE BLD-MCNC: 117 MG/DL (ref 65–99)
GLUCOSE BLD-MCNC: 119 MG/DL (ref 65–99)
GLUCOSE BLD-MCNC: 121 MG/DL (ref 65–99)
GLUCOSE SERPL-MCNC: 124 MG/DL (ref 65–99)
HCT VFR BLD AUTO: 22.3 % (ref 42–52)
HGB BLD-MCNC: 7.5 G/DL (ref 14–18)
MAGNESIUM SERPL-MCNC: 1.8 MG/DL (ref 1.5–2.5)
MCH RBC QN AUTO: 30.6 PG (ref 27–33)
MCHC RBC AUTO-ENTMCNC: 33.6 G/DL (ref 33.7–35.3)
MCV RBC AUTO: 91 FL (ref 81.4–97.8)
PHOSPHATE SERPL-MCNC: 3.4 MG/DL (ref 2.5–4.5)
PHOSPHATE SERPL-MCNC: 3.5 MG/DL (ref 2.5–4.5)
PLATELET # BLD AUTO: 357 K/UL (ref 164–446)
PMV BLD AUTO: 9.8 FL (ref 9–12.9)
POTASSIUM SERPL-SCNC: 3.9 MMOL/L (ref 3.6–5.5)
PROT SERPL-MCNC: 4.9 G/DL (ref 6–8.2)
RBC # BLD AUTO: 2.45 M/UL (ref 4.7–6.1)
SODIUM SERPL-SCNC: 139 MMOL/L (ref 135–145)
WBC # BLD AUTO: 12.6 K/UL (ref 4.8–10.8)

## 2018-07-09 PROCEDURE — 85027 COMPLETE CBC AUTOMATED: CPT

## 2018-07-09 PROCEDURE — 80053 COMPREHEN METABOLIC PANEL: CPT

## 2018-07-09 PROCEDURE — 36415 COLL VENOUS BLD VENIPUNCTURE: CPT

## 2018-07-09 PROCEDURE — 700101 HCHG RX REV CODE 250: Performed by: HOSPITALIST

## 2018-07-09 PROCEDURE — 700111 HCHG RX REV CODE 636 W/ 250 OVERRIDE (IP): Performed by: SURGERY

## 2018-07-09 PROCEDURE — 770001 HCHG ROOM/CARE - MED/SURG/GYN PRIV*

## 2018-07-09 PROCEDURE — A9270 NON-COVERED ITEM OR SERVICE: HCPCS | Performed by: SURGERY

## 2018-07-09 PROCEDURE — 82962 GLUCOSE BLOOD TEST: CPT | Mod: 91

## 2018-07-09 PROCEDURE — 700105 HCHG RX REV CODE 258: Performed by: HOSPITALIST

## 2018-07-09 PROCEDURE — 84100 ASSAY OF PHOSPHORUS: CPT

## 2018-07-09 PROCEDURE — 700111 HCHG RX REV CODE 636 W/ 250 OVERRIDE (IP): Performed by: HOSPITALIST

## 2018-07-09 PROCEDURE — 99232 SBSQ HOSP IP/OBS MODERATE 35: CPT | Performed by: HOSPITALIST

## 2018-07-09 PROCEDURE — 83735 ASSAY OF MAGNESIUM: CPT

## 2018-07-09 PROCEDURE — 700112 HCHG RX REV CODE 229: Performed by: SURGERY

## 2018-07-09 RX ORDER — MAGNESIUM SULFATE HEPTAHYDRATE 40 MG/ML
4 INJECTION, SOLUTION INTRAVENOUS ONCE
Status: COMPLETED | OUTPATIENT
Start: 2018-07-09 | End: 2018-07-09

## 2018-07-09 RX ADMIN — MAGNESIUM SULFATE IN WATER 4 G: 40 INJECTION, SOLUTION INTRAVENOUS at 09:34

## 2018-07-09 RX ADMIN — ENOXAPARIN SODIUM 40 MG: 100 INJECTION SUBCUTANEOUS at 09:30

## 2018-07-09 RX ADMIN — MORPHINE SULFATE 4 MG: 4 INJECTION INTRAVENOUS at 02:41

## 2018-07-09 RX ADMIN — DOCUSATE SODIUM 100 MG: 100 CAPSULE, LIQUID FILLED ORAL at 21:09

## 2018-07-09 RX ADMIN — POTASSIUM CHLORIDE: 2 INJECTION, SOLUTION, CONCENTRATE INTRAVENOUS at 21:11

## 2018-07-09 ASSESSMENT — ENCOUNTER SYMPTOMS
EYES NEGATIVE: 1
NERVOUS/ANXIOUS: 0
ABDOMINAL PAIN: 1
WEAKNESS: 1
CHILLS: 0
COUGH: 0
DOUBLE VISION: 0
SHORTNESS OF BREATH: 0
SPEECH CHANGE: 0
MUSCULOSKELETAL NEGATIVE: 1
SEIZURES: 0
MYALGIAS: 0
DEPRESSION: 0
EYE REDNESS: 0
NECK PAIN: 0
DIARRHEA: 0
RESPIRATORY NEGATIVE: 1
CLAUDICATION: 0
VOMITING: 0
WEIGHT LOSS: 0
FALLS: 0
DIZZINESS: 0
CONSTIPATION: 1
PSYCHIATRIC NEGATIVE: 1
CARDIOVASCULAR NEGATIVE: 1
HEMOPTYSIS: 0
EYE DISCHARGE: 0
WHEEZING: 0
FOCAL WEAKNESS: 0
HEADACHES: 0
NAUSEA: 0

## 2018-07-09 ASSESSMENT — LIFESTYLE VARIABLES: SUBSTANCE_ABUSE: 0

## 2018-07-09 ASSESSMENT — PAIN SCALES - GENERAL
PAINLEVEL_OUTOF10: 3
PAINLEVEL_OUTOF10: 7

## 2018-07-09 NOTE — CARE PLAN
Problem: Safety  Goal: Will remain free from injury  Pt has refused the bed alarm and has been educated on its importance. Pt calls appropriately. Non slip socks on. Bed in the lowest locked position.      Problem: Pain Management  Goal: Pain level will decrease to patient's comfort goal  Will medicate for pain PRN see MAR

## 2018-07-09 NOTE — DIETARY
Nutrition support weekly update:  Day 13 of admit.  Florian Trevizo is a 83 y.o. male with admitting DX of SBO.  TPN initiated on 7/2.     Pt con't to receive TPN.  Per RPh, TPN to be reduced to provide 75% of EEN d/t pt receiving a regular diet.  Per ADLs, pt has a sub-optimal po intake of meals that are recorded.     Assessment:  Weight: 68.9 kg via bed scale on 7/9   Re-estimate of nutritional needs is not indicated at this time      Evaluation:   1. TPN remains appropriate d/t pt's poor po intake and poor enteral access  2. S/p ex lap bowel resection  3. Per MD note, TPN is top be weaned off and PO diet is to be encouraged   4. Serum Glucose 124, BUN 24, creat 0.74  5. Meds reviewed   6. Last BM 7/8   7. Noted with surgical incision to abdomen     Recommendations/Plan:  · TPN per RPh   · Encourage PO Intake of meals   · Nutrition rep to see pt daily for meal choices and help set up snacks as pt desires   · Obtain measured weights per protocol to help monitor fluid and nutritional status   · Monitor wt and lab trends     RD following

## 2018-07-09 NOTE — PROGRESS NOTES
[facility] Hospitalist Progress Note    Date of Service: 2018    Chief Complaint  83 y.o. male admitted 2018 with abdominal pain.    Interval Problem Update  Postoperative day #9 after exploratory laparotomy.  Patient at this point is doing well pain is down to 2 out of 10.  He has had several bowel movements through the night and bowel sounds at this point are adequate.  Patient will be able to discharge home once he is eating at least 50% of his meals, currently remains on TPN which we will wean off    Consultants/Specialty  Surgery    Disposition  To be determined, he lives at home with his wife        Review of Systems   Constitutional: Negative for chills and weight loss.   HENT: Negative.  Negative for hearing loss.    Eyes: Negative.  Negative for double vision, discharge and redness.   Respiratory: Negative.  Negative for cough, hemoptysis, shortness of breath and wheezing.    Cardiovascular: Negative.  Negative for chest pain, claudication and leg swelling.   Gastrointestinal: Positive for abdominal pain (2/10) and constipation. Negative for diarrhea, nausea and vomiting.        Passing gas, pain 4/10 but off the pain medications   Genitourinary: Negative.  Negative for dysuria, frequency and urgency.   Musculoskeletal: Negative.  Negative for falls, joint pain, myalgias and neck pain.   Skin: Negative.    Neurological: Positive for weakness. Negative for dizziness, speech change, focal weakness, seizures and headaches.   Endo/Heme/Allergies: Negative.    Psychiatric/Behavioral: Negative.  Negative for depression, substance abuse and suicidal ideas. The patient is not nervous/anxious.    All other systems reviewed and are negative.     Physical Exam  Laboratory/Imaging   Hemodynamics  Temp (24hrs), Av.3 °C (99.1 °F), Min:36.7 °C (98.1 °F), Max:37.7 °C (99.9 °F)   Temperature: 36.7 °C (98.1 °F)  Pulse  Av.5  Min: 41  Max: 125    Blood Pressure : 140/82      Respiratory      Respiration: 18,  Pulse Oximetry: 95 %     Work Of Breathing / Effort: Shallow  RUL Breath Sounds: Clear, RML Breath Sounds: Diminished, RLL Breath Sounds: Diminished, LEXA Breath Sounds: Clear, LLL Breath Sounds: Diminished    Fluids    Intake/Output Summary (Last 24 hours) at 07/09/18 0702  Last data filed at 07/09/18 0335   Gross per 24 hour   Intake              960 ml   Output             2925 ml   Net            -1965 ml       Nutrition  Orders Placed This Encounter   Procedures   • Diet Order Regular     Standing Status:   Standing     Number of Occurrences:   1     Order Specific Question:   Diet:     Answer:   Regular [1]     Physical Exam   Constitutional: He is oriented to person, place, and time. He appears well-developed and well-nourished.   HENT:   Head: Normocephalic and atraumatic.   Right Ear: External ear normal.   Left Ear: External ear normal.   Nose: Nose normal.   Mouth/Throat: Oropharynx is clear and moist.   Eyes: Conjunctivae and EOM are normal. Pupils are equal, round, and reactive to light. Right eye exhibits no discharge. Left eye exhibits no discharge.   Neck: Normal range of motion. Neck supple. No JVD present.   Cardiovascular: Normal rate, regular rhythm, normal heart sounds and intact distal pulses.    Pulmonary/Chest: Effort normal and breath sounds normal. No respiratory distress. He has no wheezes.   Abdominal: Soft. Bowel sounds are decreased. There is tenderness (2/10).       Musculoskeletal: Normal range of motion. He exhibits no edema or deformity.   Lymphadenopathy:     He has no cervical adenopathy.   Neurological: He is alert and oriented to person, place, and time. He has normal reflexes. No cranial nerve deficit. Coordination normal.   Skin: Skin is warm and dry. No rash noted. No erythema.   Psychiatric: He has a normal mood and affect. His behavior is normal. Judgment and thought content normal.   Nursing note and vitals reviewed.      Recent Labs      07/07/18   0516  07/08/18   0533   07/09/18   0617   WBC  10.7  11.5*  12.6*   RBC  2.87*  2.62*  2.45*   HEMOGLOBIN  8.5*  7.9*  7.5*   HEMATOCRIT  26.2*  23.9*  22.3*   MCV  91.3  91.2  91.0   MCH  29.6  30.2  30.6   MCHC  32.4*  33.1*  33.6*   RDW  57.1*  56.6*  58.0*   PLATELETCT  331  351  357   MPV  10.3  9.8  9.8     Recent Labs      07/07/18   0516  07/08/18   0545  07/09/18   0422   SODIUM  143  140  139   POTASSIUM  3.8  3.8  3.9   CHLORIDE  106  106  107   CO2  30  28  27   GLUCOSE  140*  138*  124*   BUN  24*  24*  24*   CREATININE  0.81  0.63  0.74   CALCIUM  8.1*  7.7*  7.6*                      Assessment/Plan     * SBO (small bowel obstruction) (HCC)- (present on admission)   Assessment & Plan    Postoperative day #9 after exploratory laparotomy.  Patient's pain is down to 2 out of 10 in the abdomen.  At this point he had 3 bowel movements last night and bowel sounds are adequate.  At this point the patient remains on TPN for nutritional support but this can be discontinued as long as he starts eating.  He is only eating about 10% of his meals right now if he gets to about 50% at that point he will be able to discharge home.              Hypokalemia   Assessment & Plan    Potassium from this morning pending        Normocytic anemia- (present on admission)   Assessment & Plan    Slowly declining most recent hemoglobin 7.5 hematocrit 22.3  Check iron panel        Renal insufficiency   Assessment & Plan    Resolved        Severe protein-calorie malnutrition (HCC)- (present on admission)   Assessment & Plan    For right now only eating about 10% of meals continue TPN        T2DM (type 2 diabetes mellitus) (HCC)- (present on admission)   Assessment & Plan    At this point insulin is in the TPN and we are continuing at this point to monitor blood sugars        HLD (hyperlipidemia)- (present on admission)   Assessment & Plan    At this point patient will be able to restart his statins as long as liver functions are normal  Lab Results    Component Value Date/Time    CHOLSTRLTOT 78 (L) 07/02/2018 03:35 AM    TRIGLYCERIDE 121 07/02/2018 03:35 AM              HTN (hypertension)- (present on admission)   Assessment & Plan    Continue with medical optimization of his blood pressure management most recent blood pressure 140/82          Quality-Core Measures   Reviewed items::  EKG reviewed, Labs reviewed, Medications reviewed and Radiology images reviewed  Carter catheter::  Neurogenic Bladder  DVT prophylaxis pharmacological::  Enoxaparin (Lovenox)  Ulcer Prophylaxis::  Not indicated  Assessed for rehabilitation services:  Patient returned to prior level of function, rehabilitation not indicated at this time

## 2018-07-09 NOTE — PROGRESS NOTES
Assumed care of patient at 1915, received report from day RN at that time. Communication board updated and reviewed with pt. New TPN started.  TKO fluids running. Pt has refused the bed alarm and has been educated on its importance. Pt denies pain at this time. Assessment complete. No other needs at this time. Call light and personal belongings within reach.

## 2018-07-09 NOTE — PROGRESS NOTES
Patient AOX4  Ambulates Self  1 person assist w/ drains tubes and other.  Midline surgical incision edges approximated. Area slighlty raised and red.. Urostomy draining concentrated urine.  Pt. Wearing adult diaper.  Pt. Has ulcer bandage on lower back above buttocks  Goal is to get pt. To eat 50% of meals  He likes cereal. Cheerios or corn flakes with whole milk and sugar.

## 2018-07-09 NOTE — PROGRESS NOTES
Pharmacy TPN Day # 9       2018    Dosing Weight   67 kg TPN currently providing 75% of goal      TPN goal: 1675 kcal/day including 1.5 gm/kg/day Protein      TPN indication: SBO      Pertinent PMH: Patient has recent history of admission to @ [facility] days for SBO - was discharged and had recurrence of abdominal pain with vomiting. With poor nutrition over preceding weeks, limited signs of current bowel function and anticipated poor enteral access in coming days, TPN initiated for nutritional support.    Temp (24hrs), Av.2 °C (99 °F), Min:36.7 °C (98.1 °F), Max:37.7 °C (99.9 °F)  .  Recent Labs      18   0516  18   0545  18   0422   SODIUM  143  140  139   POTASSIUM  3.8  3.8  3.9   CHLORIDE  106  106  107   CO2  30  28  27   BUN  24*  24*  24*   CREATININE  0.81  0.63  0.74   GLUCOSE  140*  138*  124*   CALCIUM  8.1*  7.7*  7.6*   ASTSGOT   --    --   80*   ALTSGPT   --    --   103*   ALBUMIN   --    --   1.9*   TBILIRUBIN   --    --   0.9   PHOSPHORUS  3.5  3.6  3.4  3.5   MAGNESIUM  1.5  1.6  1.8     Accu-Checks  Recent Labs      18   2329  18   0540  18   1211   POCGLUCOSE  117*  115*  121*       Vitals:    18 1513 18 0335 18 0708   BP: 137/81 120/73 140/82 127/74   Weight:   68.9 kg (151 lb 14.4 oz)    Height:           Intake/Output Summary (Last 24 hours) at 18 1444  Last data filed at 18 1200   Gross per 24 hour   Intake              960 ml   Output             3125 ml   Net            -2165 ml       Orders Placed This Encounter   Procedures   • Diet Order Regular     Standing Status:   Standing     Number of Occurrences:   1     Order Specific Question:   Diet:     Answer:   Regular [1]         TPN for past 72 hours (Show up to 3 orders; newest on the left. Changes between the two most recent orders are indicated.)     Start date and time   2018      TPN Central Line Formulation  [[id number]] TPN Central Line Formulation [[id number]] TPN Central Line Formulation [[id number]]    Order Status  Active Completed Completed    Last Given  07/08/2018 2041 07/07/2018 2017 07/04/2018 2023       Base    Clinisol 15%  75 g 90 g 90 g    dextrose 70%  166 g 270 g 270 g    fat emulsions 20%  39 g 40 g 40 g       Additives    potassium phosphates  30 mmol 30 mmol 30 mmol    potassium chloride  80 mEq 60 mEq 60 mEq    sodium acetate  100 mEq 100 mEq 150 mEq    sodium chloride  200 mEq 200 mEq 150 mEq    calcium GLUConate  4.65 mEq 4.65 mEq 4.65 mEq    M.T.E. -5 Adult  1 mL 1 mL 1 mL    M.V.I. ADULT  10 mL 10 mL 10 mL    famotidine  40 mg 40 mg 40 mg    thiamine  -- -- 100 mg    folic acid  -- -- 1 mg       Energy Contribution    Proteins  -- -- --    Dextrose  -- -- --    Lipids  -- -- --    Total  0 kcal 0 kcal 0 kcal       Electrolyte Ion Calculated Amount    Sodium  -- -- --    Potassium  -- -- --    Calcium  -- -- --    Magnesium  -- -- --    Aluminum  -- -- --    Phosphate  -- -- --    Chloride  -- -- --    Acetate  -- -- --       Other    Total Protein  -- -- --    Total Protein/kg  -- -- --    Glucose Infusion Rate  -- -- --    Osmolarity  -- -- --    Volume  1,992 mL 1,992 mL 1,992 mL    Rate  83 mL/hr 83 mL/hr 83 mL/hr    Dosing Weight  66.8 kg 66.8 kg 66.8 kg    Infusion Site  Central Central Central            This formula provides:  % kcal as lipids = 28  Grams protein/kg = 1.1  Non-protein calories = 954  Kcals/kg = 19  Total daily calories = 1254    Comments:  Per Dr. Camarillo, this patient is only eating ~ 10% of his meals. Continue TPN providing 75% caloric goal. FSBS = 114-121. Magnesium replaced outside TPN with 4 gm IV magnesium.      Berenice Moody PharmD.

## 2018-07-10 ENCOUNTER — APPOINTMENT (OUTPATIENT)
Dept: RADIOLOGY | Facility: MEDICAL CENTER | Age: 83
DRG: 329 | End: 2018-07-10
Attending: SURGERY
Payer: MEDICARE

## 2018-07-10 PROBLEM — D72.829 LEUKOCYTOSIS: Status: ACTIVE | Noted: 2018-07-10

## 2018-07-10 LAB
ANION GAP SERPL CALC-SCNC: 9 MMOL/L (ref 0–11.9)
BUN SERPL-MCNC: 21 MG/DL (ref 8–22)
CALCIUM SERPL-MCNC: 7.7 MG/DL (ref 8.5–10.5)
CHLORIDE SERPL-SCNC: 107 MMOL/L (ref 96–112)
CO2 SERPL-SCNC: 24 MMOL/L (ref 20–33)
CREAT SERPL-MCNC: 0.79 MG/DL (ref 0.5–1.4)
ERYTHROCYTE [DISTWIDTH] IN BLOOD BY AUTOMATED COUNT: 57 FL (ref 35.9–50)
GLUCOSE BLD-MCNC: 100 MG/DL (ref 65–99)
GLUCOSE BLD-MCNC: 115 MG/DL (ref 65–99)
GLUCOSE BLD-MCNC: 125 MG/DL (ref 65–99)
GLUCOSE BLD-MCNC: 133 MG/DL (ref 65–99)
GLUCOSE SERPL-MCNC: 106 MG/DL (ref 65–99)
HCT VFR BLD AUTO: 23.7 % (ref 42–52)
HGB BLD-MCNC: 7.8 G/DL (ref 14–18)
MCH RBC QN AUTO: 30.1 PG (ref 27–33)
MCHC RBC AUTO-ENTMCNC: 32.9 G/DL (ref 33.7–35.3)
MCV RBC AUTO: 91.5 FL (ref 81.4–97.8)
PHOSPHATE SERPL-MCNC: 3.4 MG/DL (ref 2.5–4.5)
PLATELET # BLD AUTO: 416 K/UL (ref 164–446)
PMV BLD AUTO: 9.6 FL (ref 9–12.9)
POTASSIUM SERPL-SCNC: 3.9 MMOL/L (ref 3.6–5.5)
RBC # BLD AUTO: 2.59 M/UL (ref 4.7–6.1)
SODIUM SERPL-SCNC: 140 MMOL/L (ref 135–145)
WBC # BLD AUTO: 17.1 K/UL (ref 4.8–10.8)

## 2018-07-10 PROCEDURE — 700111 HCHG RX REV CODE 636 W/ 250 OVERRIDE (IP): Performed by: HOSPITALIST

## 2018-07-10 PROCEDURE — 700111 HCHG RX REV CODE 636 W/ 250 OVERRIDE (IP): Performed by: SURGERY

## 2018-07-10 PROCEDURE — 700101 HCHG RX REV CODE 250: Performed by: HOSPITALIST

## 2018-07-10 PROCEDURE — 770001 HCHG ROOM/CARE - MED/SURG/GYN PRIV*

## 2018-07-10 PROCEDURE — 99233 SBSQ HOSP IP/OBS HIGH 50: CPT | Performed by: INTERNAL MEDICINE

## 2018-07-10 PROCEDURE — 700105 HCHG RX REV CODE 258: Performed by: HOSPITALIST

## 2018-07-10 PROCEDURE — 84100 ASSAY OF PHOSPHORUS: CPT

## 2018-07-10 PROCEDURE — 700102 HCHG RX REV CODE 250 W/ 637 OVERRIDE(OP): Performed by: INTERNAL MEDICINE

## 2018-07-10 PROCEDURE — 71046 X-RAY EXAM CHEST 2 VIEWS: CPT

## 2018-07-10 PROCEDURE — 80048 BASIC METABOLIC PNL TOTAL CA: CPT

## 2018-07-10 PROCEDURE — 85027 COMPLETE CBC AUTOMATED: CPT

## 2018-07-10 PROCEDURE — A9270 NON-COVERED ITEM OR SERVICE: HCPCS | Performed by: INTERNAL MEDICINE

## 2018-07-10 PROCEDURE — 82962 GLUCOSE BLOOD TEST: CPT

## 2018-07-10 RX ORDER — OXYCODONE HYDROCHLORIDE 10 MG/1
10 TABLET ORAL EVERY 4 HOURS PRN
Status: DISCONTINUED | OUTPATIENT
Start: 2018-07-10 | End: 2018-07-15

## 2018-07-10 RX ORDER — OXYCODONE HYDROCHLORIDE 5 MG/1
5 TABLET ORAL EVERY 4 HOURS PRN
Status: DISCONTINUED | OUTPATIENT
Start: 2018-07-10 | End: 2018-07-15

## 2018-07-10 RX ADMIN — MORPHINE SULFATE 2 MG: 4 INJECTION INTRAVENOUS at 06:13

## 2018-07-10 RX ADMIN — POTASSIUM CHLORIDE: 2 INJECTION, SOLUTION, CONCENTRATE INTRAVENOUS at 20:48

## 2018-07-10 RX ADMIN — ENOXAPARIN SODIUM 40 MG: 100 INJECTION SUBCUTANEOUS at 06:09

## 2018-07-10 RX ADMIN — OXYCODONE HYDROCHLORIDE 10 MG: 10 TABLET ORAL at 10:53

## 2018-07-10 ASSESSMENT — ENCOUNTER SYMPTOMS
MUSCULOSKELETAL NEGATIVE: 1
NERVOUS/ANXIOUS: 0
DOUBLE VISION: 0
WEAKNESS: 1
PSYCHIATRIC NEGATIVE: 1
FEVER: 0
HEADACHES: 0
COUGH: 0
ABDOMINAL PAIN: 1
EYES NEGATIVE: 1
NAUSEA: 0
EYE DISCHARGE: 0
SEIZURES: 0
HEMOPTYSIS: 0
EYE REDNESS: 0
SHORTNESS OF BREATH: 0
CLAUDICATION: 0
RESPIRATORY NEGATIVE: 1
MYALGIAS: 0
CONSTIPATION: 0
CARDIOVASCULAR NEGATIVE: 1
NECK PAIN: 0

## 2018-07-10 ASSESSMENT — PAIN SCALES - GENERAL
PAINLEVEL_OUTOF10: 4
PAINLEVEL_OUTOF10: 7

## 2018-07-10 ASSESSMENT — LIFESTYLE VARIABLES: SUBSTANCE_ABUSE: 0

## 2018-07-10 NOTE — PROGRESS NOTES
Bedside report received.    Assessment complete.  A&O x 4. Patient calls appropriately.  Patient ambulates with front wheel walker and one person assist.   Patient has 7/10 pain. Declines pain medication at this time.   Denies N&V. Reports poor appetite. Eats about 10% of meals. Had about 2/3 of Boost supplement this AM. TPN in use  Surgical incision midline, approximated with staples. Redness noted, especially at base of incision.  + void (urostomy to down drain bag), + flatus (patient reports multiple loose BMS overnight)  Patient denies SOB.  Patient reports he 'just doesn't feel good'.    Review plan with of care with patient. Call light and personal belongings with in reach. Hourly rounding in place. All needs met at this time.

## 2018-07-10 NOTE — DISCHARGE PLANNING
Anticipated Discharge Disposition: Home with TPN    Action: This RN CM met with pt at bedside regarding home TPN.  Pt chose [facility].    Barriers to Discharge: Pending Insurance Auth    Plan: Faxed choice form to GAYATRI Nolasco

## 2018-07-10 NOTE — PROGRESS NOTES
Pharmacy TPN Day # 10       7/10/2018    Dosing Weight   67 kg   TPN currently providing 75% of goal      TPN goal: 1675 kcal/day including 1.5 gm/kg/day Protein      TPN indication: SBO      Pertinent PMH: Patient has recent history of admission to [facility] for SBO - was discharged and had recurrence of abdominal pain with vomiting. With poor nutrition over preceding weeks, limited signs of current bowel function and anticipated poor enteral access in coming days, TPN initiated for nutritional support.    Temp (24hrs), Av.2 °C (99 °F), Min:37.1 °C (98.8 °F), Max:37.3 °C (99.2 °F)  .  Recent Labs      18   0545  18   0422  07/10/18   0343   SODIUM  140  139  140   POTASSIUM  3.8  3.9  3.9   CHLORIDE  106  107  107   CO2  28  27  24   BUN  24*  24*  21   CREATININE  0.63  0.74  0.79   GLUCOSE  138*  124*  106*   CALCIUM  7.7*  7.6*  7.7*   ASTSGOT   --   80*   --    ALTSGPT   --   103*   --    ALBUMIN   --   1.9*   --    TBILIRUBIN   --   0.9   --    PHOSPHORUS  3.6  3.4  3.5  3.4   MAGNESIUM  1.6  1.8   --      Accu-Checks  Recent Labs      07/10/18   0007  07/10/18   0616  07/10/18   1155   POCGLUCOSE  125*  115*  100*       Vitals:    18 1635 18 1900 07/10/18 0330 07/10/18 0700   BP: 139/77 138/87 141/73 132/80   Weight:       Height:           Intake/Output Summary (Last 24 hours) at 07/10/18 1453  Last data filed at 07/10/18 0800   Gross per 24 hour   Intake             2376 ml   Output             2100 ml   Net              276 ml       Orders Placed This Encounter   Procedures   • Diet Order Regular     Standing Status:   Standing     Number of Occurrences:   1     Order Specific Question:   Diet:     Answer:   Regular [1]         TPN for past 72 hours (Show up to 3 orders; newest on the left. Changes between the two most recent orders are indicated.)     Start date and time   2018      TPN Central Line Formulation [[id number]] TPN  Central Line Formulation [[id number]] TPN Central Line Formulation [[id number]]    Order Status  Active Completed Completed    Last Given  07/09/2018 2111 07/07/2018 2017 07/04/2018 2023       Base    Clinisol 15%  75 g 90 g 90 g    dextrose 70%  166 g 270 g 270 g    fat emulsions 20%  39 g 40 g 40 g       Additives    potassium phosphates  30 mmol 30 mmol 30 mmol    potassium chloride  80 mEq 60 mEq 60 mEq    sodium acetate  100 mEq 100 mEq 150 mEq    sodium chloride  200 mEq 200 mEq 150 mEq    calcium GLUConate  4.65 mEq 4.65 mEq 4.65 mEq    M.T.E. -5 Adult  1 mL 1 mL 1 mL    M.V.I. ADULT  10 mL 10 mL 10 mL    famotidine  40 mg 40 mg 40 mg    thiamine  -- -- 100 mg    folic acid  -- -- 1 mg       Energy Contribution    Proteins  -- -- --    Dextrose  -- -- --    Lipids  -- -- --    Total  0 kcal 0 kcal 0 kcal       Electrolyte Ion Calculated Amount    Sodium  -- -- --    Potassium  -- -- --    Calcium  -- -- --    Magnesium  -- -- --    Aluminum  -- -- --    Phosphate  -- -- --    Chloride  -- -- --    Acetate  -- -- --       Other    Total Protein  -- -- --    Total Protein/kg  -- -- --    Glucose Infusion Rate  -- -- --    Osmolarity  -- -- --    Volume  1,992 mL 1,992 mL 1,992 mL    Rate  83 mL/hr 83 mL/hr 83 mL/hr    Dosing Weight  66.8 kg 66.8 kg 66.8 kg    Infusion Site  Central Central Central            This formula provides:  % kcal as lipids = 28  Grams protein/kg = 1.1  Non-protein calories = 954  Kcals/kg = 19  Total daily calories = 1254    Comments:  Referral to [facility] for home TPN. Pt still not eating well. Electrolytes are stable. Continue same TPN.      Jorge SmallsD.

## 2018-07-10 NOTE — CARE PLAN
Problem: Safety  Goal: Will remain free from falls  Outcome: PROGRESSING AS EXPECTED    Intervention: Assess risk factors for falls   07/10/18 0301   OTHER   Fall Risk Risk to Fall - 0 - 1 point   Risk for Injury-Any positive answers results in the pt being at high risk for fall related injury Surgery: Thoracic or Abdominal Surgery or Lower Limb Amputation   Mobility Status Assessment 1-1 Healthcare Provider Required for Assistance with Ambulation & Transfer   History of fall 0   Pt Calls for Assistance Yes     Intervention: Implement fall precautions   07/09/18 1941 07/10/18 0301   OTHER   Environmental Precautions Treaded Slipper Socks on Patient;Personal Belongings, Wastebasket, Call Bell etc. in Easy Reach;Transferred to Stronger Side;Report Given to Other Health Care Providers Regarding Fall Risk;Bed in Low Position;Communication Sign for Patients & Families;Mobility Assessed & Appropriate Sign Placed --    IV Pole on Same Side of Bed as Bathroom --  Yes   Bedrails --  Bedrails Closest to Bathroom Down         Problem: Venous Thromboembolism (VTW)/Deep Vein Thrombosis (DVT) Prevention:  Goal: Patient will participate in Venous Thrombosis (VTE)/Deep Vein Thrombosis (DVT)Prevention Measures  Outcome: PROGRESSING AS EXPECTED   07/09/18 1941   OTHER   Risk Assessment Score 3   VTE RISK High   Pharmacologic Prophylaxis Used LMWH: Enoxaparin(Lovenox)       Problem: Bowel/Gastric:  Goal: Normal bowel function is maintained or improved  Outcome: PROGRESSING AS EXPECTED   07/10/18 0000   OTHER   Last BM 07/10/18   Number of Times Stooled 1

## 2018-07-10 NOTE — PROGRESS NOTES
"Bedside Report received   Assumed care of Mr \"Al\" at 1900.    Pt is A&O x4.  Pain denied at this time  Nausea denied at this time  Tolerating Diet with very poor appetite. TPN running via central line.   Surgical incision to midline abd. CDI. CHALO. Approximated with staples. No drainage noted  Urostomy to RLQ. CDI. Running to down drain bag.   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   + Urine output via urostomy - hazy/ yellow output  + BM   + Flatus  Up x 1 assistance with front wheel walker  SCD's refused despite education on DVT risk.   Bed in lowest position and locked.  Bed alarm refused despite education on fall risk.   Pt resting comfortably now.  Review plan of care with patient  Call light within reach  Hourly rounds in place  All needs met at this time  "

## 2018-07-10 NOTE — PROGRESS NOTES
"Surgical Progress Note:      No acute events  Multiple BMs yesterday      PE:  /80   Pulse 97   Temp 37.2 °C (99 °F)   Resp (!) 22   Ht 1.854 m (6' 1\")   Wt 68.9 kg (151 lb 14.4 oz)   SpO2 95%   BMI 20.04 kg/m²     I/O:   Intake/Output Summary (Last 24 hours) at 07/10/18 0903  Last data filed at 07/10/18 0700   Gross per 24 hour   Intake             2556 ml   Output             2900 ml   Net             -344 ml     UOP:  PO:  IV:    GEN: NAD  COR: mildly tachy  PULM:   ABD: soft, less distended, incision intact      Labs:  Recent Labs      07/08/18   0545  07/09/18   0617  07/10/18   0343   WBC  11.5*  12.6*  17.1*   RBC  2.62*  2.45*  2.59*   HEMOGLOBIN  7.9*  7.5*  7.8*   HEMATOCRIT  23.9*  22.3*  23.7*   MCV  91.2  91.0  91.5   MCH  30.2  30.6  30.1   RDW  56.6*  58.0*  57.0*   PLATELETCT  351  357  416   MPV  9.8  9.8  9.6     Recent Labs      07/08/18   0545  07/09/18   0422  07/10/18   0343   SODIUM  140  139  140   POTASSIUM  3.8  3.9  3.9   CHLORIDE  106  107  107   CO2  28  27  24   GLUCOSE  138*  124*  106*   BUN  24*  24*  21         A/P:   S/P ex lap bowel resection  Not eating much, consider decreasing TPN  Leukocytosis today, check CXR, consider CT tomorrow if remains elevated     Emanuel Bobby M.D.  [facility]  [phone]    "

## 2018-07-11 LAB
ABO GROUP BLD: NORMAL
BARCODED ABORH UBTYP: 5100
BARCODED PRD CODE UBPRD: NORMAL
BARCODED UNIT NUM UBUNT: NORMAL
BLD GP AB SCN SERPL QL: NORMAL
COMPONENT R 8504R: NORMAL
ERYTHROCYTE [DISTWIDTH] IN BLOOD BY AUTOMATED COUNT: 58.4 FL (ref 35.9–50)
GLUCOSE BLD-MCNC: 117 MG/DL (ref 65–99)
GLUCOSE BLD-MCNC: 117 MG/DL (ref 65–99)
GLUCOSE BLD-MCNC: 119 MG/DL (ref 65–99)
GLUCOSE BLD-MCNC: 125 MG/DL (ref 65–99)
HCT VFR BLD AUTO: 19 % (ref 42–52)
HGB BLD-MCNC: 6.2 G/DL (ref 14–18)
MCH RBC QN AUTO: 30.5 PG (ref 27–33)
MCHC RBC AUTO-ENTMCNC: 32.6 G/DL (ref 33.7–35.3)
MCV RBC AUTO: 93.6 FL (ref 81.4–97.8)
PHOSPHATE SERPL-MCNC: 3.4 MG/DL (ref 2.5–4.5)
PLATELET # BLD AUTO: 456 K/UL (ref 164–446)
PMV BLD AUTO: 9.9 FL (ref 9–12.9)
PRODUCT TYPE UPROD: NORMAL
RBC # BLD AUTO: 2.03 M/UL (ref 4.7–6.1)
RH BLD: NORMAL
UNIT STATUS USTAT: NORMAL
WBC # BLD AUTO: 16.7 K/UL (ref 4.8–10.8)

## 2018-07-11 PROCEDURE — 86901 BLOOD TYPING SEROLOGIC RH(D): CPT

## 2018-07-11 PROCEDURE — 82962 GLUCOSE BLOOD TEST: CPT

## 2018-07-11 PROCEDURE — A9270 NON-COVERED ITEM OR SERVICE: HCPCS | Performed by: INTERNAL MEDICINE

## 2018-07-11 PROCEDURE — 85027 COMPLETE CBC AUTOMATED: CPT

## 2018-07-11 PROCEDURE — 700112 HCHG RX REV CODE 229: Performed by: SURGERY

## 2018-07-11 PROCEDURE — 700111 HCHG RX REV CODE 636 W/ 250 OVERRIDE (IP): Performed by: HOSPITALIST

## 2018-07-11 PROCEDURE — 700102 HCHG RX REV CODE 250 W/ 637 OVERRIDE(OP): Performed by: INTERNAL MEDICINE

## 2018-07-11 PROCEDURE — 700111 HCHG RX REV CODE 636 W/ 250 OVERRIDE (IP): Performed by: SURGERY

## 2018-07-11 PROCEDURE — P9016 RBC LEUKOCYTES REDUCED: HCPCS

## 2018-07-11 PROCEDURE — 86900 BLOOD TYPING SEROLOGIC ABO: CPT

## 2018-07-11 PROCEDURE — 86850 RBC ANTIBODY SCREEN: CPT

## 2018-07-11 PROCEDURE — 86923 COMPATIBILITY TEST ELECTRIC: CPT

## 2018-07-11 PROCEDURE — 770001 HCHG ROOM/CARE - MED/SURG/GYN PRIV*

## 2018-07-11 PROCEDURE — 84100 ASSAY OF PHOSPHORUS: CPT

## 2018-07-11 PROCEDURE — 700105 HCHG RX REV CODE 258

## 2018-07-11 PROCEDURE — A9270 NON-COVERED ITEM OR SERVICE: HCPCS | Performed by: SURGERY

## 2018-07-11 PROCEDURE — 700105 HCHG RX REV CODE 258: Performed by: HOSPITALIST

## 2018-07-11 PROCEDURE — 700101 HCHG RX REV CODE 250: Performed by: HOSPITALIST

## 2018-07-11 PROCEDURE — 36430 TRANSFUSION BLD/BLD COMPNT: CPT

## 2018-07-11 PROCEDURE — 99233 SBSQ HOSP IP/OBS HIGH 50: CPT | Performed by: INTERNAL MEDICINE

## 2018-07-11 RX ORDER — SODIUM CHLORIDE 9 MG/ML
INJECTION, SOLUTION INTRAVENOUS
Status: COMPLETED
Start: 2018-07-11 | End: 2018-07-11

## 2018-07-11 RX ORDER — FERROUS SULFATE 325(65) MG
325 TABLET ORAL
Status: DISCONTINUED | OUTPATIENT
Start: 2018-07-11 | End: 2018-07-18 | Stop reason: HOSPADM

## 2018-07-11 RX ADMIN — OXYCODONE HYDROCHLORIDE 10 MG: 10 TABLET ORAL at 20:02

## 2018-07-11 RX ADMIN — ENOXAPARIN SODIUM 40 MG: 100 INJECTION SUBCUTANEOUS at 05:30

## 2018-07-11 RX ADMIN — SODIUM CHLORIDE: 9 INJECTION, SOLUTION INTRAVENOUS at 16:30

## 2018-07-11 RX ADMIN — Medication 325 MG: at 16:29

## 2018-07-11 RX ADMIN — POTASSIUM CHLORIDE: 2 INJECTION, SOLUTION, CONCENTRATE INTRAVENOUS at 20:04

## 2018-07-11 RX ADMIN — DOCUSATE SODIUM 100 MG: 100 CAPSULE, LIQUID FILLED ORAL at 05:30

## 2018-07-11 RX ADMIN — SODIUM CHLORIDE: 9 INJECTION, SOLUTION INTRAVENOUS at 04:30

## 2018-07-11 ASSESSMENT — ENCOUNTER SYMPTOMS
DOUBLE VISION: 0
MUSCULOSKELETAL NEGATIVE: 1
WEAKNESS: 1
RESPIRATORY NEGATIVE: 1
ABDOMINAL PAIN: 1
MYALGIAS: 0
HEMOPTYSIS: 0
SEIZURES: 0
CHILLS: 0
HEARTBURN: 0
SHORTNESS OF BREATH: 0
CONSTIPATION: 0
DIAPHORESIS: 0
NECK PAIN: 0
NERVOUS/ANXIOUS: 0
FEVER: 0
CLAUDICATION: 0
NAUSEA: 0
EYES NEGATIVE: 1
CARDIOVASCULAR NEGATIVE: 1
PSYCHIATRIC NEGATIVE: 1

## 2018-07-11 ASSESSMENT — PAIN SCALES - GENERAL
PAINLEVEL_OUTOF10: 3
PAINLEVEL_OUTOF10: 6
PAINLEVEL_OUTOF10: 3
PAINLEVEL_OUTOF10: 3

## 2018-07-11 ASSESSMENT — LIFESTYLE VARIABLES: SUBSTANCE_ABUSE: 0

## 2018-07-11 NOTE — PROGRESS NOTES
TPN Pharmacy Note Addendum:    Received order to start cyclic TPN. Continuous TPN already compounded for tonight. Will obtain labs in AM and transition to cyclic TPN tomorrow.    Verna Shane, Pharm.D, BCPS

## 2018-07-11 NOTE — PROGRESS NOTES
Report received from day RN. Assumed care at 1915.  A/O x4. Calls appropriately before getting OOB.  On 1L NC. Saturation above 90%.  Reports pain 4/10 out of 0-10 pain scale at this time. No pain medication needed at this time.  No c/o of N/V. Tolerating regular diet fine, but is having poor appetite during the day. TPN infusion in place. New bag hung and verified with 2nd RN.  (+) flatus, (-) BM, pt states he hasn't had any BM today. normoactive BS. RLQ urostomy in place, + urine output through urostomy. Attached to down drain overnight.  Noted: midline abdomen incision with staples and open to air and approximated.  Ambulates with one person using front wheel walker, no attempts to get out of bed this evening. Fatigue.  VSS.  Reviewed POC for evening. All questions answered.   Call light within reach. Bed at lowest position w/ bed brakes locked. Hourly rounding in place.

## 2018-07-11 NOTE — PROGRESS NOTES
[facility] Hospitalist Progress Note    Date of Service: 2018    Chief Complaint  83 y.o. male admitted 2018 with abdominal pain.    Interval Problem Update  Postoperative day #9 after exploratory laparotomy.  Patient at this point is doing well pain is down to 2 out of 10.  He has had several bowel movements through the night and bowel sounds at this point are adequate.  Patient will be able to discharge home once he is eating at least 50% of his meals, currently remains on TPN which we will wean off     reports minimal abdominal discomfort  Tolerating 50% of meals  Increasing strength  Denies cough    Consultants/Specialty  Surgery    Disposition  To be determined, he lives at home with his wife  Accepted by home health        Review of Systems   Constitutional: Negative for chills, diaphoresis, fever and malaise/fatigue.   HENT: Negative.    Eyes: Negative.  Negative for double vision.   Respiratory: Negative.  Negative for hemoptysis and shortness of breath.    Cardiovascular: Negative.  Negative for chest pain and claudication.   Gastrointestinal: Positive for abdominal pain (2/10). Negative for constipation, heartburn and nausea.        Passing gas, pain 4/10 but off the pain medications   Genitourinary: Negative.  Negative for dysuria, frequency and urgency.   Musculoskeletal: Negative.  Negative for myalgias and neck pain.   Skin: Negative.    Neurological: Positive for weakness. Negative for seizures.   Endo/Heme/Allergies: Negative.    Psychiatric/Behavioral: Negative.  Negative for substance abuse. The patient is not nervous/anxious.    All other systems reviewed and are negative.     Physical Exam  Laboratory/Imaging   Hemodynamics  Temp (24hrs), Av.3 °C (99.1 °F), Min:36.6 °C (97.8 °F), Max:37.7 °C (99.9 °F)   Temperature: 37.4 °C (99.3 °F)  Pulse  Av.3  Min: 41  Max: 125    Blood Pressure : 134/82      Respiratory      Respiration: 20, Pulse Oximetry: 94 %     Work Of Breathing /  Effort: Mild  RUL Breath Sounds: Clear, RML Breath Sounds: Diminished, RLL Breath Sounds: Diminished, LEXA Breath Sounds: Clear, LLL Breath Sounds: Diminished    Fluids    Intake/Output Summary (Last 24 hours) at 07/11/18 1454  Last data filed at 07/11/18 1400   Gross per 24 hour   Intake             2916 ml   Output             2690 ml   Net              226 ml       Nutrition  Orders Placed This Encounter   Procedures   • Diet Order Regular     Standing Status:   Standing     Number of Occurrences:   1     Order Specific Question:   Diet:     Answer:   Regular [1]     Physical Exam   Constitutional: He is oriented to person, place, and time. No distress.   HENT:   Head: Normocephalic and atraumatic.   Right Ear: External ear normal.   Left Ear: External ear normal.   Mouth/Throat: Oropharynx is clear and moist.   Eyes: Conjunctivae and EOM are normal. Pupils are equal, round, and reactive to light.   Neck: Normal range of motion. Neck supple.   Cardiovascular: Normal rate, regular rhythm and intact distal pulses.    Pulmonary/Chest: Effort normal and breath sounds normal. No respiratory distress. He has no wheezes.   Abdominal: Soft. He exhibits no distension. Bowel sounds are decreased. Tenderness: 2/10.       Musculoskeletal: Normal range of motion. He exhibits no edema.   Neurological: He is alert and oriented to person, place, and time. He has normal reflexes. No cranial nerve deficit. Coordination normal.   Skin: Skin is warm and dry. He is not diaphoretic. No erythema.   Psychiatric: He has a normal mood and affect. His behavior is normal. Judgment and thought content normal.   Nursing note and vitals reviewed.      Recent Labs      07/09/18   0617  07/10/18   0343  07/11/18   0335   WBC  12.6*  17.1*  16.7*   RBC  2.45*  2.59*  2.03*   HEMOGLOBIN  7.5*  7.8*  6.2*   HEMATOCRIT  22.3*  23.7*  19.0*   MCV  91.0  91.5  93.6   MCH  30.6  30.1  30.5   MCHC  33.6*  32.9*  32.6*   RDW  58.0*  57.0*  58.4*    PLATELETCT  357  416  456*   MPV  9.8  9.6  9.9     Recent Labs      07/09/18   0422  07/10/18   0343   SODIUM  139  140   POTASSIUM  3.9  3.9   CHLORIDE  107  107   CO2  27  24   GLUCOSE  124*  106*   BUN  24*  21   CREATININE  0.74  0.79   CALCIUM  7.6*  7.7*                      Assessment/Plan     * SBO (small bowel obstruction) (HCC)- (present on admission)   Assessment & Plan    Postoperative day #10 after exploratory laparotomy.    + BM  At this point the patient remains on TPN for nutritional support but this can be discontinued as long as he starts eating.  He is only eating about 10% of his meals right now if he gets to about 50% at that point he will be able to discharge home.        Encourage activity  Tolerating 50% of meals now  We'll discuss with pharmacy to continue only evening TPN              Hypokalemia   Assessment & Plan    Potassium from this morning pending        Normocytic anemia- (present on admission)   Assessment & Plan    Slowly declining most recent hemoglobin 7.5 hematocrit 22.3  Check iron panel        Renal insufficiency   Assessment & Plan    Resolved        Severe protein-calorie malnutrition (HCC)- (present on admission)   Assessment & Plan    For right now only eating about 10% of meals continue TPN        Leukocytosis   Assessment & Plan    Unclear etiology  Slightly improving today  - CXR- bilateral atelectasis  We'll consider obtaining CT abdomen pelvis if ongoing leukocytosis        T2DM (type 2 diabetes mellitus) (HCC)- (present on admission)   Assessment & Plan    At this point insulin is in the TPN and we are continuing at this point to monitor blood sugars  Blood sugar control  We'll consider discontinue Accu-Chek        HLD (hyperlipidemia)- (present on admission)   Assessment & Plan    At this point patient will be able to restart his statins as long as liver functions are normal  Lab Results   Component Value Date/Time    CHOLSTRLTOT 78 (L) 07/02/2018 03:35 AM     TRIGLYCERIDE 121 07/02/2018 03:35 AM              HTN (hypertension)- (present on admission)   Assessment & Plan    Continue with medical optimization of his blood pressure management most recent blood pressure 140/82          Quality-Core Measures   Reviewed items::  EKG reviewed, Labs reviewed, Medications reviewed and Radiology images reviewed  Carter catheter::  Neurogenic Bladder  DVT prophylaxis pharmacological::  Enoxaparin (Lovenox)  Ulcer Prophylaxis::  Not indicated  Assessed for rehabilitation services:  Patient returned to prior level of function, rehabilitation not indicated at this time

## 2018-07-11 NOTE — ASSESSMENT & PLAN NOTE
Secondary to surgical wound cellulitis  -resolving, afebrile  Wound culture positive for MSSA and E. coli  -change abx's to Bactrim, since it covers both above organisms  -secretion from open wound has decreased considerably  -GS following

## 2018-07-11 NOTE — PROGRESS NOTES
[facility] Hospitalist Progress Note    Date of Service: 7/10/2018    Chief Complaint  83 y.o. male admitted 2018 with abdominal pain.    Interval Problem Update  Postoperative day #9 after exploratory laparotomy.  Patient at this point is doing well pain is down to 2 out of 10.  He has had several bowel movements through the night and bowel sounds at this point are adequate.  Patient will be able to discharge home once he is eating at least 50% of his meals, currently remains on TPN which we will wean off    7/10 improving abdominal pain and weakness  Poor oral intake  Encouraged oral intake   Denies cough or sob    Consultants/Specialty  Surgery    Disposition  To be determined, he lives at home with his wife  Accepted by home health        Review of Systems   Constitutional: Negative for fever.   HENT: Negative.  Negative for hearing loss.    Eyes: Negative.  Negative for double vision, discharge and redness.   Respiratory: Negative.  Negative for cough, hemoptysis and shortness of breath.    Cardiovascular: Negative.  Negative for chest pain, claudication and leg swelling.   Gastrointestinal: Positive for abdominal pain (2/10). Negative for constipation and nausea.        Passing gas, pain 4/10 but off the pain medications   Genitourinary: Negative.  Negative for dysuria, frequency and urgency.   Musculoskeletal: Negative.  Negative for joint pain, myalgias and neck pain.   Skin: Negative.    Neurological: Positive for weakness. Negative for seizures and headaches.   Endo/Heme/Allergies: Negative.    Psychiatric/Behavioral: Negative.  Negative for substance abuse and suicidal ideas. The patient is not nervous/anxious.    All other systems reviewed and are negative.     Physical Exam  Laboratory/Imaging   Hemodynamics  Temp (24hrs), Av.1 °C (98.8 °F), Min:36.6 °C (97.8 °F), Max:37.3 °C (99.2 °F)   Temperature: 36.6 °C (97.8 °F)  Pulse  Av.9  Min: 41  Max: 125    Blood Pressure : 124/80       Respiratory      Respiration: 20, Pulse Oximetry: 96 %     Work Of Breathing / Effort: Mild  RUL Breath Sounds: Clear, RML Breath Sounds: Diminished, RLL Breath Sounds: Diminished, LEXA Breath Sounds: Clear, LLL Breath Sounds: Diminished    Fluids    Intake/Output Summary (Last 24 hours) at 07/10/18 1822  Last data filed at 07/10/18 1515   Gross per 24 hour   Intake             2436 ml   Output             1900 ml   Net              536 ml       Nutrition  Orders Placed This Encounter   Procedures   • Diet Order Regular     Standing Status:   Standing     Number of Occurrences:   1     Order Specific Question:   Diet:     Answer:   Regular [1]     Physical Exam   Constitutional: He is oriented to person, place, and time. He appears well-developed. No distress.   HENT:   Head: Normocephalic and atraumatic.   Right Ear: External ear normal.   Left Ear: External ear normal.   Nose: Nose normal.   Mouth/Throat: Oropharynx is clear and moist.   Eyes: Conjunctivae and EOM are normal. Pupils are equal, round, and reactive to light.   Neck: Normal range of motion. Neck supple. No JVD present.   Cardiovascular: Normal rate, regular rhythm and intact distal pulses.    Pulmonary/Chest: Effort normal and breath sounds normal. No respiratory distress.   Abdominal: Soft. He exhibits no distension. Bowel sounds are decreased. There is no tenderness (2/10).       Musculoskeletal: Normal range of motion. He exhibits no edema or tenderness.   Neurological: He is alert and oriented to person, place, and time. He has normal reflexes. No cranial nerve deficit.   Skin: Skin is warm and dry. No rash noted. No erythema. No pallor.   Psychiatric: He has a normal mood and affect. His behavior is normal. Judgment and thought content normal.   Nursing note and vitals reviewed.      Recent Labs      07/08/18   0545  07/09/18   0617  07/10/18   0343   WBC  11.5*  12.6*  17.1*   RBC  2.62*  2.45*  2.59*   HEMOGLOBIN  7.9*  7.5*  7.8*    HEMATOCRIT  23.9*  22.3*  23.7*   MCV  91.2  91.0  91.5   MCH  30.2  30.6  30.1   MCHC  33.1*  33.6*  32.9*   RDW  56.6*  58.0*  57.0*   PLATELETCT  351  357  416   MPV  9.8  9.8  9.6     Recent Labs      07/08/18   0545  07/09/18   0422  07/10/18   0343   SODIUM  140  139  140   POTASSIUM  3.8  3.9  3.9   CHLORIDE  106  107  107   CO2  28  27  24   GLUCOSE  138*  124*  106*   BUN  24*  24*  21   CREATININE  0.63  0.74  0.79   CALCIUM  7.7*  7.6*  7.7*                      Assessment/Plan     * SBO (small bowel obstruction) (HCC)- (present on admission)   Assessment & Plan    Postoperative day #10 after exploratory laparotomy.    + BM  At this point the patient remains on TPN for nutritional support but this can be discontinued as long as he starts eating.  He is only eating about 10% of his meals right now if he gets to about 50% at that point he will be able to discharge home.    Will consider evening TPN, to encourage oral intake in the a.m.    Encourage activity  May be a  candidate for discharge to home with home health if increasing oral intake              Hypokalemia   Assessment & Plan    Potassium from this morning pending        Normocytic anemia- (present on admission)   Assessment & Plan    Slowly declining most recent hemoglobin 7.5 hematocrit 22.3  Check iron panel        Renal insufficiency   Assessment & Plan    Resolved        Severe protein-calorie malnutrition (HCC)- (present on admission)   Assessment & Plan    For right now only eating about 10% of meals continue TPN        Leukocytosis   Assessment & Plan    Unclear etiology  Monitor  f/u CXR        T2DM (type 2 diabetes mellitus) (HCC)- (present on admission)   Assessment & Plan    At this point insulin is in the TPN and we are continuing at this point to monitor blood sugars  Blood sugar control  We'll consider discontinue Accu-Chek        HLD (hyperlipidemia)- (present on admission)   Assessment & Plan    At this point patient will be  able to restart his statins as long as liver functions are normal  Lab Results   Component Value Date/Time    CHOLSTRLTOT 78 (L) 07/02/2018 03:35 AM    TRIGLYCERIDE 121 07/02/2018 03:35 AM              HTN (hypertension)- (present on admission)   Assessment & Plan    Continue with medical optimization of his blood pressure management most recent blood pressure 140/82          Quality-Core Measures   Reviewed items::  EKG reviewed, Labs reviewed, Medications reviewed and Radiology images reviewed  Carter catheter::  Neurogenic Bladder  DVT prophylaxis pharmacological::  Enoxaparin (Lovenox)  Ulcer Prophylaxis::  Not indicated  Assessed for rehabilitation services:  Patient returned to prior level of function, rehabilitation not indicated at this time

## 2018-07-11 NOTE — CARE PLAN
Problem: Pain Management  Goal: Pain level will decrease to patient's comfort goal  Patient denying pain interventions at this time.     Problem: Mobility  Goal: Risk for activity intolerance will decrease  Patient ambulated to doorway and slightly into hallway. Patient sitting up for meals.

## 2018-07-11 NOTE — CARE PLAN
Problem: Communication  Goal: The ability to communicate needs accurately and effectively will improve  Outcome: PROGRESSING AS EXPECTED  Reviewed plan of care. All questions answered and all needs met. Call light within reach. Hourly rounding in place.    Problem: Knowledge Deficit  Goal: Knowledge of disease process/condition, treatment plan, diagnostic tests, and medications will improve  Outcome: PROGRESSING AS EXPECTED  Continue with TPN, encourage pt to increase oral intake during meal time. Continue to monitor pt needs.

## 2018-07-11 NOTE — PROGRESS NOTES
Assumed care of patient. Patient denying pain or nausea. Midline incision to abdomen is approximated with staples and no signs of infection noted. Patient sitting up for meals. Poor intake still and TPN is running. Previously placed urostomy is producing clear, yellow urine. No other needs at this time. Call light within reach.

## 2018-07-11 NOTE — PROGRESS NOTES
Pharmacy TPN Day # 11       2018    Dosing Weight   67 kg  TPN currently providing 75% of goal      TPN goal: 1929-4678 kcal/day including 1.2-1.5 gm/kg/day Protein      TPN indication: SBO       Pertinent PMH: Patient has recent history of admission to [facility] x[facility] days for SBO, patient was discharged and had recurrence of abdominal pain with vomiting. With poor nutrition over preceding weeks, limited signs of current bowel function and anticipated poor enteral access in coming days, TPN initiated for nutritional support.  Temp (24hrs), Av.2 °C (99 °F), Min:36.6 °C (97.8 °F), Max:37.7 °C (99.9 °F)  .  Recent Labs      18   0422  07/10/18   0343  18   0335   SODIUM  139  140   --    POTASSIUM  3.9  3.9   --    CHLORIDE  107  107   --    CO2  27  24   --    BUN  24*  21   --    CREATININE  0.74  0.79   --    GLUCOSE  124*  106*   --    CALCIUM  7.6*  7.7*   --    ASTSGOT  80*   --    --    ALTSGPT  103*   --    --    ALBUMIN  1.9*   --    --    TBILIRUBIN  0.9   --    --    PHOSPHORUS  3.4  3.5  3.4  3.4   MAGNESIUM  1.8   --    --      Accu-Checks  Recent Labs      07/10/18   1737  18   0007  18   0529   POCGLUCOSE  133*  125*  119*       Vitals:    07/10/18 0700 07/10/18 1515 07/10/18 1935 18 0400   BP: 132/80 124/80 124/82 127/71   Weight:       Height:           Intake/Output Summary (Last 24 hours) at 18 1212  Last data filed at 18 0400   Gross per 24 hour   Intake             1956 ml   Output             1490 ml   Net              466 ml       Orders Placed This Encounter   Procedures   • Diet Order Regular     Standing Status:   Standing     Number of Occurrences:   1     Order Specific Question:   Diet:     Answer:   Regular [1]         TPN for past 72 hours (Show up to 3 orders; newest on the left. Changes between the two most recent orders are indicated.)     Start date and time   2018 2000      TPN Central Line Formulation  [[id number]] TPN Central Line Formulation [[id number]] TPN Central Line Formulation [[id number]]    Order Status  Active Completed Completed    Last Given  07/10/2018 2048 07/07/2018 2017 07/04/2018 2023       Base    Clinisol 15%  75 g 90 g 90 g    dextrose 70%  166 g 270 g 270 g    fat emulsions 20%  39 g 40 g 40 g       Additives    potassium phosphates  30 mmol 30 mmol 30 mmol    potassium chloride  80 mEq 60 mEq 60 mEq    sodium acetate  100 mEq 100 mEq 150 mEq    sodium chloride  200 mEq 200 mEq 150 mEq    calcium GLUConate  4.65 mEq 4.65 mEq 4.65 mEq    M.T.E. -5 Adult  1 mL 1 mL 1 mL    M.V.I. ADULT  10 mL 10 mL 10 mL    famotidine  40 mg 40 mg 40 mg    thiamine  -- -- 100 mg    folic acid  -- -- 1 mg             Other    Total Protein  -- -- --    Total Protein/kg  -- -- --    Glucose Infusion Rate  -- -- --    Osmolarity  -- -- --    Volume  1,992 mL 1,992 mL 1,992 mL    Rate  83 mL/hr 83 mL/hr 83 mL/hr    Dosing Weight  66.8 kg 66.8 kg 66.8 kg    Infusion Site  [facility]            This formula provides:  % kcal as lipids = 31  Grams protein/kg = 1.1  Non-protein calories = 954  Kcals/kg = 19  Total daily calories = 1254    Comments:  -No new labs.   -FSBS <150 on insulin sliding scale. HgA1c on 7/6/18 6.4. Will monitor - if continues to be controlled, consider reducing frequency of accu-check or continued need.  -Patient on regular diet (documented <25%) with oral nutrition supplementation (50-75%). Continue TPN at 75% calorie goal. No changes made to TPN.      Verna Shane, Pharm.D, BCPS

## 2018-07-12 PROBLEM — E83.42 HYPOMAGNESEMIA: Status: ACTIVE | Noted: 2018-07-12

## 2018-07-12 LAB
ALBUMIN SERPL BCP-MCNC: 1.9 G/DL (ref 3.2–4.9)
ALBUMIN/GLOB SERPL: 0.6 G/DL
ALP SERPL-CCNC: 163 U/L (ref 30–99)
ALT SERPL-CCNC: 79 U/L (ref 2–50)
ANION GAP SERPL CALC-SCNC: 5 MMOL/L (ref 0–11.9)
AST SERPL-CCNC: 47 U/L (ref 12–45)
BASOPHILS # BLD AUTO: 0.2 % (ref 0–1.8)
BASOPHILS # BLD: 0.03 K/UL (ref 0–0.12)
BILIRUB SERPL-MCNC: 1.2 MG/DL (ref 0.1–1.5)
BUN SERPL-MCNC: 18 MG/DL (ref 8–22)
CALCIUM SERPL-MCNC: 7.7 MG/DL (ref 8.5–10.5)
CHLORIDE SERPL-SCNC: 104 MMOL/L (ref 96–112)
CO2 SERPL-SCNC: 27 MMOL/L (ref 20–33)
CREAT SERPL-MCNC: 0.81 MG/DL (ref 0.5–1.4)
EOSINOPHIL # BLD AUTO: 0.16 K/UL (ref 0–0.51)
EOSINOPHIL NFR BLD: 1.2 % (ref 0–6.9)
ERYTHROCYTE [DISTWIDTH] IN BLOOD BY AUTOMATED COUNT: 56.3 FL (ref 35.9–50)
GLOBULIN SER CALC-MCNC: 3.2 G/DL (ref 1.9–3.5)
GLUCOSE BLD-MCNC: 104 MG/DL (ref 65–99)
GLUCOSE BLD-MCNC: 109 MG/DL (ref 65–99)
GLUCOSE BLD-MCNC: 111 MG/DL (ref 65–99)
GLUCOSE BLD-MCNC: 133 MG/DL (ref 65–99)
GLUCOSE BLD-MCNC: 142 MG/DL (ref 65–99)
GLUCOSE SERPL-MCNC: 104 MG/DL (ref 65–99)
HCT VFR BLD AUTO: 25.1 % (ref 42–52)
HGB BLD-MCNC: 8 G/DL (ref 14–18)
IMM GRANULOCYTES # BLD AUTO: 0.22 K/UL (ref 0–0.11)
IMM GRANULOCYTES NFR BLD AUTO: 1.7 % (ref 0–0.9)
LYMPHOCYTES # BLD AUTO: 0.81 K/UL (ref 1–4.8)
LYMPHOCYTES NFR BLD: 6.1 % (ref 22–41)
MAGNESIUM SERPL-MCNC: 1.3 MG/DL (ref 1.5–2.5)
MCH RBC QN AUTO: 29.5 PG (ref 27–33)
MCHC RBC AUTO-ENTMCNC: 31.9 G/DL (ref 33.7–35.3)
MCV RBC AUTO: 92.6 FL (ref 81.4–97.8)
MONOCYTES # BLD AUTO: 1.35 K/UL (ref 0–0.85)
MONOCYTES NFR BLD AUTO: 10.2 % (ref 0–13.4)
NEUTROPHILS # BLD AUTO: 10.67 K/UL (ref 1.82–7.42)
NEUTROPHILS NFR BLD: 80.6 % (ref 44–72)
NRBC # BLD AUTO: 0 K/UL
NRBC BLD-RTO: 0 /100 WBC
PHOSPHATE SERPL-MCNC: 3.6 MG/DL (ref 2.5–4.5)
PLATELET # BLD AUTO: 431 K/UL (ref 164–446)
PMV BLD AUTO: 9.4 FL (ref 9–12.9)
POTASSIUM SERPL-SCNC: 3.9 MMOL/L (ref 3.6–5.5)
PROT SERPL-MCNC: 5.1 G/DL (ref 6–8.2)
RBC # BLD AUTO: 2.71 M/UL (ref 4.7–6.1)
SODIUM SERPL-SCNC: 136 MMOL/L (ref 135–145)
WBC # BLD AUTO: 13.2 K/UL (ref 4.8–10.8)

## 2018-07-12 PROCEDURE — 700111 HCHG RX REV CODE 636 W/ 250 OVERRIDE (IP): Performed by: INTERNAL MEDICINE

## 2018-07-12 PROCEDURE — 87205 SMEAR GRAM STAIN: CPT

## 2018-07-12 PROCEDURE — G8979 MOBILITY GOAL STATUS: HCPCS | Mod: CI

## 2018-07-12 PROCEDURE — 87077 CULTURE AEROBIC IDENTIFY: CPT

## 2018-07-12 PROCEDURE — 82962 GLUCOSE BLOOD TEST: CPT | Mod: 91

## 2018-07-12 PROCEDURE — 700102 HCHG RX REV CODE 250 W/ 637 OVERRIDE(OP): Performed by: INTERNAL MEDICINE

## 2018-07-12 PROCEDURE — 700105 HCHG RX REV CODE 258

## 2018-07-12 PROCEDURE — 85025 COMPLETE CBC W/AUTO DIFF WBC: CPT

## 2018-07-12 PROCEDURE — 700105 HCHG RX REV CODE 258: Performed by: INTERNAL MEDICINE

## 2018-07-12 PROCEDURE — 84100 ASSAY OF PHOSPHORUS: CPT

## 2018-07-12 PROCEDURE — 97530 THERAPEUTIC ACTIVITIES: CPT

## 2018-07-12 PROCEDURE — 700111 HCHG RX REV CODE 636 W/ 250 OVERRIDE (IP): Performed by: SURGERY

## 2018-07-12 PROCEDURE — 83735 ASSAY OF MAGNESIUM: CPT

## 2018-07-12 PROCEDURE — A9270 NON-COVERED ITEM OR SERVICE: HCPCS | Performed by: INTERNAL MEDICINE

## 2018-07-12 PROCEDURE — 87070 CULTURE OTHR SPECIMN AEROBIC: CPT

## 2018-07-12 PROCEDURE — A9270 NON-COVERED ITEM OR SERVICE: HCPCS | Performed by: SURGERY

## 2018-07-12 PROCEDURE — G8980 MOBILITY D/C STATUS: HCPCS | Mod: CI

## 2018-07-12 PROCEDURE — 700101 HCHG RX REV CODE 250: Performed by: INTERNAL MEDICINE

## 2018-07-12 PROCEDURE — 87186 SC STD MICRODIL/AGAR DIL: CPT | Mod: 91

## 2018-07-12 PROCEDURE — 97116 GAIT TRAINING THERAPY: CPT

## 2018-07-12 PROCEDURE — 770001 HCHG ROOM/CARE - MED/SURG/GYN PRIV*

## 2018-07-12 PROCEDURE — 80053 COMPREHEN METABOLIC PANEL: CPT

## 2018-07-12 PROCEDURE — 700102 HCHG RX REV CODE 250 W/ 637 OVERRIDE(OP): Performed by: SURGERY

## 2018-07-12 PROCEDURE — 700105 HCHG RX REV CODE 258: Performed by: HOSPITALIST

## 2018-07-12 PROCEDURE — 99233 SBSQ HOSP IP/OBS HIGH 50: CPT | Performed by: INTERNAL MEDICINE

## 2018-07-12 RX ORDER — CEFDINIR 300 MG/1
300 CAPSULE ORAL EVERY 12 HOURS
Status: DISCONTINUED | OUTPATIENT
Start: 2018-07-12 | End: 2018-07-17

## 2018-07-12 RX ORDER — MAGNESIUM SULFATE HEPTAHYDRATE 40 MG/ML
4 INJECTION, SOLUTION INTRAVENOUS ONCE
Status: COMPLETED | OUTPATIENT
Start: 2018-07-12 | End: 2018-07-12

## 2018-07-12 RX ORDER — SODIUM CHLORIDE 9 MG/ML
INJECTION, SOLUTION INTRAVENOUS
Status: COMPLETED
Start: 2018-07-12 | End: 2018-07-12

## 2018-07-12 RX ADMIN — Medication 325 MG: at 18:00

## 2018-07-12 RX ADMIN — SODIUM CHLORIDE: 9 INJECTION, SOLUTION INTRAVENOUS at 08:00

## 2018-07-12 RX ADMIN — OXYCODONE HYDROCHLORIDE 10 MG: 10 TABLET ORAL at 20:27

## 2018-07-12 RX ADMIN — ENOXAPARIN SODIUM 40 MG: 100 INJECTION SUBCUTANEOUS at 06:26

## 2018-07-12 RX ADMIN — MAGNESIUM SULFATE IN WATER 4 G: 40 INJECTION, SOLUTION INTRAVENOUS at 08:25

## 2018-07-12 RX ADMIN — SODIUM CHLORIDE: 9 INJECTION, SOLUTION INTRAVENOUS at 08:04

## 2018-07-12 RX ADMIN — Medication 325 MG: at 08:03

## 2018-07-12 RX ADMIN — Medication 325 MG: at 12:03

## 2018-07-12 RX ADMIN — MAGNESIUM GLUCONATE 500 MG ORAL TABLET 400 MG: 500 TABLET ORAL at 08:28

## 2018-07-12 RX ADMIN — CEFDINIR 300 MG: 300 CAPSULE ORAL at 12:03

## 2018-07-12 RX ADMIN — POTASSIUM CHLORIDE: 2 INJECTION, SOLUTION, CONCENTRATE INTRAVENOUS at 20:28

## 2018-07-12 RX ADMIN — CEFDINIR 300 MG: 300 CAPSULE ORAL at 18:00

## 2018-07-12 ASSESSMENT — COGNITIVE AND FUNCTIONAL STATUS - GENERAL
WALKING IN HOSPITAL ROOM: A LITTLE
CLIMB 3 TO 5 STEPS WITH RAILING: A LITTLE
SUGGESTED CMS G CODE MODIFIER MOBILITY: CJ
MOBILITY SCORE: 20
STANDING UP FROM CHAIR USING ARMS: A LITTLE
MOVING FROM LYING ON BACK TO SITTING ON SIDE OF FLAT BED: A LITTLE

## 2018-07-12 ASSESSMENT — ENCOUNTER SYMPTOMS
HEMOPTYSIS: 0
CLAUDICATION: 0
VOMITING: 0
DIARRHEA: 0
CHILLS: 0
NAUSEA: 0
EYES NEGATIVE: 1
WEAKNESS: 1
CARDIOVASCULAR NEGATIVE: 1
CONSTIPATION: 0
ABDOMINAL PAIN: 1
DEPRESSION: 0
FOCAL WEAKNESS: 0
PSYCHIATRIC NEGATIVE: 1
MYALGIAS: 0
RESPIRATORY NEGATIVE: 1
FEVER: 0
NERVOUS/ANXIOUS: 0
MUSCULOSKELETAL NEGATIVE: 1
SEIZURES: 0

## 2018-07-12 ASSESSMENT — PAIN SCALES - GENERAL
PAINLEVEL_OUTOF10: 5
PAINLEVEL_OUTOF10: 6
PAINLEVEL_OUTOF10: 6

## 2018-07-12 ASSESSMENT — GAIT ASSESSMENTS
ASSISTIVE DEVICE: FRONT WHEEL WALKER
DEVIATION: OTHER (COMMENT)
GAIT LEVEL OF ASSIST: SUPERVISED
DISTANCE (FEET): 100

## 2018-07-12 NOTE — PROGRESS NOTES
[facility] Hospitalist Progress Note    Date of Service: 2018    Chief Complaint  83 y.o. male admitted 2018 with abdominal pain.    Interval Problem Update  Postoperative, exploratory laparotomy.  Patient at this point is doing well pain is down to 2 out of 10.  He has had several bowel movements through the night and bowel sounds at this point are adequate.  Patient will be able to discharge home once he is eating at least 50% of his meals, currently remains on TPN which we will wean off     50% of meals, excited to go home soon, min pustular drainage at surgical wound site, denies pain    Consultants/Specialty  Surgery    Disposition  To be determined, he lives at home with his wife  Accepted by home health, tpn pending  Out of bed 3 times a day  Encourage activity        Review of Systems   Constitutional: Negative for chills, fever and malaise/fatigue.   HENT: Negative.    Eyes: Negative.    Respiratory: Negative.  Negative for hemoptysis.    Cardiovascular: Negative.  Negative for chest pain and claudication.   Gastrointestinal: Positive for abdominal pain (2/10). Negative for constipation, diarrhea, nausea and vomiting.        Passing gas, pain 4/10 but off the pain medications   Genitourinary:        Urostomy   Musculoskeletal: Negative.  Negative for myalgias.   Skin: Negative.    Neurological: Positive for weakness. Negative for focal weakness and seizures.   Endo/Heme/Allergies: Negative.    Psychiatric/Behavioral: Negative.  Negative for depression. The patient is not nervous/anxious.    All other systems reviewed and are negative.     Physical Exam  Laboratory/Imaging   Hemodynamics  Temp (24hrs), Av.6 °C (99.7 °F), Min:37.2 °C (98.9 °F), Max:38.1 °C (100.6 °F)   Temperature: 37.8 °C (100.1 °F)  Pulse  Av.8  Min: 41  Max: 125    Blood Pressure : 134/80      Respiratory      Respiration: 20, Pulse Oximetry: 94 %     Work Of Breathing / Effort: Mild  RUL Breath Sounds: Clear, RML Breath  Sounds: Diminished, RLL Breath Sounds: Diminished, LEXA Breath Sounds: Clear, LLL Breath Sounds: Diminished    Fluids    Intake/Output Summary (Last 24 hours) at 07/12/18 1146  Last data filed at 07/12/18 0900   Gross per 24 hour   Intake             2966 ml   Output             2500 ml   Net              466 ml       Nutrition  Orders Placed This Encounter   Procedures   • Diet Order Regular     Standing Status:   Standing     Number of Occurrences:   1     Order Specific Question:   Diet:     Answer:   Regular [1]     Physical Exam   Constitutional: He is oriented to person, place, and time. No distress.   HENT:   Head: Normocephalic and atraumatic.   Right Ear: External ear normal.   Left Ear: External ear normal.   Mouth/Throat: Oropharynx is clear and moist.   Eyes: Conjunctivae and EOM are normal. Pupils are equal, round, and reactive to light.   Neck: Normal range of motion. Neck supple.   Cardiovascular: Normal rate, regular rhythm and intact distal pulses.    Pulmonary/Chest: Effort normal and breath sounds normal. No respiratory distress.   Abdominal: Soft. He exhibits no distension and no mass. Bowel sounds are decreased. There is no tenderness (2/10).       Midline wound with improving surrounding erythema, wound opening < 1cm with pustular dc   Musculoskeletal: Normal range of motion. He exhibits no edema.   Neurological: He is alert and oriented to person, place, and time. He has normal reflexes. No cranial nerve deficit.   Skin: Skin is warm and dry. He is not diaphoretic. No erythema.   Psychiatric: He has a normal mood and affect. His behavior is normal. Judgment and thought content normal.   Nursing note and vitals reviewed.      Recent Labs      07/10/18   0343  07/11/18   0335  07/12/18   0410   WBC  17.1*  16.7*  13.2*   RBC  2.59*  2.03*  2.71*   HEMOGLOBIN  7.8*  6.2*  8.0*   HEMATOCRIT  23.7*  19.0*  25.1*   MCV  91.5  93.6  92.6   MCH  30.1  30.5  29.5   MCHC  32.9*  32.6*  31.9*   RDW  57.0*   58.4*  56.3*   PLATELETCT  416  456*  431   MPV  9.6  9.9  9.4     Recent Labs      07/10/18   0343  07/12/18   0410   SODIUM  140  136   POTASSIUM  3.9  3.9   CHLORIDE  107  104   CO2  24  27   GLUCOSE  106*  104*   BUN  21  18   CREATININE  0.79  0.81   CALCIUM  7.7*  7.7*                      Assessment/Plan     * SBO (small bowel obstruction) (HCC)- (present on admission)   Assessment & Plan    Postoperative day #11 after exploratory laparotomy.    + BM  At this point the patient remains on TPN for nutritional support but this can be discontinued as long as he starts eating.  He is only eating about 10% of his meals right now if he gets to about 50% at that point he will be able to discharge home.        Encourage activity/oob  Tolerating 50% of meals now  Cyclical/ evening TPN    7/12 midline wound with pustular drainage and small opening  Staples in place, start abx, wound cx  Surgery aware            Hypokalemia   Assessment & Plan    Resolved        Normocytic anemia- (present on admission)   Assessment & Plan    hgb 6 to 8  s/p  1 u prbc   Iron <10,   On oral supplements  monitor        Renal insufficiency   Assessment & Plan    Resolved        Severe protein-calorie malnutrition (HCC)- (present on admission)   Assessment & Plan    For right now only eating about 10% of meals continue TPN        Hypomagnesemia   Assessment & Plan    Start oral supplements        Leukocytosis   Assessment & Plan    Unclear etiology  improving today  - CXR- bilateral atelectasis          T2DM (type 2 diabetes mellitus) (HCC)- (present on admission)   Assessment & Plan    At this point insulin is in the TPN and we are continuing at this point to monitor blood sugars  Blood sugar control  We'll consider discontinue Accu-Chek        HLD (hyperlipidemia)- (present on admission)   Assessment & Plan    At this point patient will be able to restart his statins as long as liver functions are normal  Lab Results   Component Value  Date/Time    CHOLSTRLTOT 78 (L) 07/02/2018 03:35 AM    TRIGLYCERIDE 121 07/02/2018 03:35 AM              HTN (hypertension)- (present on admission)   Assessment & Plan    Continue with medical optimization of his blood pressure management most recent blood pressure 140/82          Quality-Core Measures   Reviewed items::  EKG reviewed, Labs reviewed, Medications reviewed and Radiology images reviewed  Carter catheter::  Neurogenic Bladder  DVT prophylaxis pharmacological::  Enoxaparin (Lovenox)  Ulcer Prophylaxis::  Not indicated  Assessed for rehabilitation services:  Patient returned to prior level of function, rehabilitation not indicated at this time

## 2018-07-12 NOTE — PROGRESS NOTES
"Surgical Progress Note:      Some abdominal pain   Passing gas and BM  Still not eating much, but drinking shakes      PE:  /80   Pulse (!) 101   Temp 37.8 °C (100.1 °F)   Resp 20   Ht 1.854 m (6' 1\")   Wt 74.1 kg (163 lb 5.8 oz)   SpO2 94%   BMI 21.55 kg/m²     I/O:   Intake/Output Summary (Last 24 hours) at 07/12/18 0852  Last data filed at 07/12/18 0800   Gross per 24 hour   Intake             2966 ml   Output             2500 ml   Net              466 ml     UOP:  PO:  IV:    GEN: NAD  COR: RRR  PULM: CTA  ABD: soft, NTND, incision with some surrounding erythema      Labs:  Recent Labs      07/10/18   0343  07/11/18   0335  07/12/18   0410   WBC  17.1*  16.7*  13.2*   RBC  2.59*  2.03*  2.71*   HEMOGLOBIN  7.8*  6.2*  8.0*   HEMATOCRIT  23.7*  19.0*  25.1*   MCV  91.5  93.6  92.6   MCH  30.1  30.5  29.5   RDW  57.0*  58.4*  56.3*   PLATELETCT  416  456*  431   MPV  9.6  9.9  9.4   NEUTSPOLYS   --    --   80.60*   LYMPHOCYTES   --    --   6.10*   MONOCYTES   --    --   10.20   EOSINOPHILS   --    --   1.20   BASOPHILS   --    --   0.20     Recent Labs      07/10/18   0343  07/12/18   0410   SODIUM  140  136   POTASSIUM  3.9  3.9   CHLORIDE  107  104   CO2  24  27   GLUCOSE  106*  104*   BUN  21  18         A/P:   S/P ex lap bowel resection  Bowel function returned but not eating much  Converting to cyclic TPN, which may help  Superficial wound infection, start antibiotics     Emanuel Bobby M.D.  [facility]  [phone]    "

## 2018-07-12 NOTE — PROGRESS NOTES
82 yo M  Full Code  NKA  Admit 6/26    Assessment done     A+Ox4    1L O2 NC    Pacemaker in place    Pt states pain 5/10, declines medication at this time    R Triple lumen PICC with 24 hour TPN running  White lumen flushes easily, brisk blood return present  Grey lumen flushes easily, brisk blood return present  Red lumen flushes easily, brisk blood return present  TKO to 2 unused lumens with NS at 30ml/hr  TPN running at 83 ml/hr through red lumen    Active BS x4  LBM 7/11  Regular diet with TPN    Urostomy in place to RLQ  Orders for urostomy care Q3-5 days and PRN  Will provide care per orders    Midline abd incision well approximated with staples, CHALO  Redness noted and marked  Pt states tender to palpation    Bed low and locked, call light and belongings in reach, hourly rounding in place, pt calls appropriately for assistance    Moderate fall risk per marcio ribera  Will place bed alarm    Discussed POC: daily weight, transition to cyclic TPN, possible D/C of [facility]    All needs met at this time

## 2018-07-12 NOTE — CARE PLAN
Problem: Safety  Goal: Will remain free from falls  Outcome: PROGRESSING AS EXPECTED  Bed at lowest position. Bed brakes locked. Upper side rails x 2. Call light within reach. Personal belongings at side table and within reach.     Problem: Knowledge Deficit  Goal: Knowledge of disease process/condition, treatment plan, diagnostic tests, and medications will improve  Outcome: PROGRESSING AS EXPECTED  Monitor daily labs including hgb levels for signs of blood loss/anemia. Reviewed with pt plans for evening, including lab draw from central line during the morning.

## 2018-07-12 NOTE — THERAPY
"Physical Therapy Treatment completed.   Bed Mobility:  Supine to Sit: Modified Independent  Transfers: Sit to Stand: Supervised  Gait: Level Of Assist: Supervised with Front-Wheel Walker       Plan of Care: Will benefit from Physical Therapy 3 times per week  Discharge Recommendations: Equipment: Will Continue to Assess for Equipment Needs.     See \"Rehab Therapy-Acute\" Patient Summary Report for complete documentation.     Pt presenting w/ increased functional mobility. Pt states his pain is more managed so he is able to move better. Pt was able to perform mobility at a SPV-SBA level. Pt mainly requires cues to slow down during ambulation and stairs. Pt able to walk short distances w/out the FWW however showed improved stability w/ the FWW. Pt states that he has 24/7 assist at home. At this time pt has met all goals and no longer requires skilled acute physical therapy. Recommend pt still get up w/ nursing staff to maintain mobility.  "

## 2018-07-12 NOTE — PROGRESS NOTES
Pharmacy TPN Day # 12       2018    Dosing Weight   67  kg TPN currently providing 75% of goal (transitioning to Cyclic TPN with tonight's bag)      TPN goal: 1347-2720 kcal/day including 1.2-1.5 gm/kg/day Protein                                                  TPN indication: SBO    Pertinent PMH: Patient has recent [facility]BO, patient was discharged and had recurrence of abdominal pain with vomiting. With poor nutrition over preceding weeks, limited signs of current bowel function and anticipated poor enteral access in coming days, TPN initiated for nutritional support    Temp (24hrs), Av.6 °C (99.7 °F), Min:37.2 °C (98.9 °F), Max:38.1 °C (100.6 °F)  .  Recent Labs      07/10/18   0343  18   0335  18   0410   SODIUM  140   --   136   POTASSIUM  3.9   --   3.9   CHLORIDE  107   --   104   CO2  24   --   27   BUN  21   --   18   CREATININE  0.79   --   0.81   GLUCOSE  106*   --   104*   CALCIUM  7.7*   --   7.7*   ASTSGOT   --    --   47*   ALTSGPT   --    --   79*   ALBUMIN   --    --   1.9*   TBILIRUBIN   --    --   1.2   PHOSPHORUS  3.4  3.4  3.6   MAGNESIUM   --    --   1.3*     Accu-Checks  Recent Labs      18   1633  18   0007  18   0626   POCGLUCOSE  117*  109*  133*       Vitals:    18 1800 18 1905 18 0320 18 0810   BP: 129/83 130/80 123/78 134/80   Weight:  74.1 kg (163 lb 5.8 oz)     Height:           Intake/Output Summary (Last 24 hours) at 18 1047  Last data filed at 18 0900   Gross per 24 hour   Intake             2966 ml   Output             2500 ml   Net              466 ml       Orders Placed This Encounter   Procedures   • Diet Order Regular     Standing Status:   Standing     Number of Occurrences:   1     Order Specific Question:   Diet:     Answer:   Regular [1]         TPN for past 72 hours (Show up to 3 orders; newest on the left. Changes between the two most recent orders are  indicated.)     Start date and time   07/12/2018 2000 07/08/2018 2000 07/05/2018 2000      TPN Central Line Formulation [[id number]] TPN Central Line Formulation [[id number]] TPN Central Line Formulation [[id number]]    Order Status  Active Last Dose in Progress Completed    Last Given   07/11/2018 2004 07/07/2018 2017       Base    Clinisol 15%  75 g 75 g 90 g    dextrose 70%  166 g 166 g 270 g    fat emulsions 20%  39 g 39 g 40 g       Additives    potassium phosphates  30 mmol 30 mmol 30 mmol    potassium chloride  80 mEq 80 mEq 60 mEq    sodium acetate  70 mEq 100 mEq 100 mEq    sodium chloride  145 mEq 200 mEq 200 mEq    calcium GLUConate  4.65 mEq 4.65 mEq 4.65 mEq    M.T.E. -5 Adult  1 mL 1 mL 1 mL    M.V.I. ADULT  10 mL 10 mL 10 mL    famotidine  40 mg 40 mg 40 mg       Energy Contribution    Proteins  -- -- --    Dextrose  -- -- --    Lipids  -- -- --    Total  0 kcal 0 kcal 0 kcal       Electrolyte Ion Calculated Amount    Sodium  -- -- --    Potassium  -- -- --    Calcium  -- -- --    Magnesium  -- -- --    Aluminum  -- -- --    Phosphate  -- -- --    Chloride  -- -- --    Acetate  -- -- --       Other    Total Protein  -- -- --    Total Protein/kg  -- -- --    Glucose Infusion Rate  -- -- --    Osmolarity  -- -- --    Volume  1,400 mL 1,992 mL 1,992 mL    Rate   mL/hr 83 mL/hr 83 mL/hr    Dosing Weight  74.1 kg 66.8 kg 66.8 kg    Infusion Site  Central Central Central            This formula provides:  % kcal as lipids = 31  Grams protein/kg = 1.1  Non-protein calories = 954  Kcals/kg = 19  Total daily calories = 1254    Comments:  1) Will transition to 12 hour Cyclic TPN tonight. Will start TPN at 75 ml/hr x 1 hr then 125 ml/hr x 10 hrs then 75 ml/hr x 1 hr then turn TPN off. Will have FSBS checked 2 hrs after the start of the TPN and 1 hr after TPN is turned off.  2) Mag low at 1.3, will give Mag 4 g ivpb per protocol. Will check mag level tomorrow. Note: Magnesium is not included in TPN formulation  due to national shortage, MAg being replaced outside of TPN.  3) Patient on regular diet (documented <25%) with oral nutrition supplementation (50-75%). Continue TPN at 75% calorie goal. No changes made to macronutrients in TPN.      Jesus Tracey, PharmD

## 2018-07-12 NOTE — PROGRESS NOTES
Report received from day RN. Assumed care at 1915.  A/O x4. Calls appropriately before getting OOB.  On 1L NC. Saturation above 90%.  Reports pain 6/10 out of 0-10 pain scale at this time. Medicated per pain.  No c/o of N/V. Tolerating regular diet fine. TPN infusion in place. New bag hung and verified with 2nd RN.  (+) flatus, (+) BM, normoactive BS. RLQ urostomy in place, + urine output through urostomy. Attached to down drain overnight.  Noted: midline abdomen incision with staples and open to air and approximated.   Ambulates with one person using front wheel walker, no attempts to get out of bed this evening. Fatigue.  SCDs off. VSS.  Reviewed POC for evening. All questions answered.   Call light within reach. Bed at lowest position w/ bed brakes locked. Hourly rounding in place.

## 2018-07-13 LAB
ERYTHROCYTE [DISTWIDTH] IN BLOOD BY AUTOMATED COUNT: 55.6 FL (ref 35.9–50)
GLUCOSE BLD-MCNC: 117 MG/DL (ref 65–99)
GLUCOSE BLD-MCNC: 118 MG/DL (ref 65–99)
GLUCOSE BLD-MCNC: 147 MG/DL (ref 65–99)
GLUCOSE BLD-MCNC: 88 MG/DL (ref 65–99)
GRAM STN SPEC: NORMAL
HCT VFR BLD AUTO: 25.9 % (ref 42–52)
HGB BLD-MCNC: 8.4 G/DL (ref 14–18)
MAGNESIUM SERPL-MCNC: 1.5 MG/DL (ref 1.5–2.5)
MCH RBC QN AUTO: 29.7 PG (ref 27–33)
MCHC RBC AUTO-ENTMCNC: 32.4 G/DL (ref 33.7–35.3)
MCV RBC AUTO: 91.5 FL (ref 81.4–97.8)
PHOSPHATE SERPL-MCNC: 3.6 MG/DL (ref 2.5–4.5)
PLATELET # BLD AUTO: 407 K/UL (ref 164–446)
PMV BLD AUTO: 8.9 FL (ref 9–12.9)
RBC # BLD AUTO: 2.83 M/UL (ref 4.7–6.1)
SIGNIFICANT IND 70042: NORMAL
SITE SITE: NORMAL
SOURCE SOURCE: NORMAL
WBC # BLD AUTO: 13.3 K/UL (ref 4.8–10.8)

## 2018-07-13 PROCEDURE — 83735 ASSAY OF MAGNESIUM: CPT

## 2018-07-13 PROCEDURE — A9270 NON-COVERED ITEM OR SERVICE: HCPCS | Performed by: INTERNAL MEDICINE

## 2018-07-13 PROCEDURE — 770001 HCHG ROOM/CARE - MED/SURG/GYN PRIV*

## 2018-07-13 PROCEDURE — 700105 HCHG RX REV CODE 258: Performed by: HOSPITALIST

## 2018-07-13 PROCEDURE — 82962 GLUCOSE BLOOD TEST: CPT

## 2018-07-13 PROCEDURE — 36415 COLL VENOUS BLD VENIPUNCTURE: CPT

## 2018-07-13 PROCEDURE — 99232 SBSQ HOSP IP/OBS MODERATE 35: CPT | Performed by: INTERNAL MEDICINE

## 2018-07-13 PROCEDURE — A9270 NON-COVERED ITEM OR SERVICE: HCPCS | Performed by: SURGERY

## 2018-07-13 PROCEDURE — 700102 HCHG RX REV CODE 250 W/ 637 OVERRIDE(OP): Performed by: INTERNAL MEDICINE

## 2018-07-13 PROCEDURE — 700111 HCHG RX REV CODE 636 W/ 250 OVERRIDE (IP): Performed by: SURGERY

## 2018-07-13 PROCEDURE — 700102 HCHG RX REV CODE 250 W/ 637 OVERRIDE(OP): Performed by: SURGERY

## 2018-07-13 PROCEDURE — 700101 HCHG RX REV CODE 250: Performed by: INTERNAL MEDICINE

## 2018-07-13 PROCEDURE — 84100 ASSAY OF PHOSPHORUS: CPT

## 2018-07-13 PROCEDURE — 700112 HCHG RX REV CODE 229: Performed by: SURGERY

## 2018-07-13 PROCEDURE — 85027 COMPLETE CBC AUTOMATED: CPT

## 2018-07-13 PROCEDURE — 700111 HCHG RX REV CODE 636 W/ 250 OVERRIDE (IP): Performed by: INTERNAL MEDICINE

## 2018-07-13 PROCEDURE — 700105 HCHG RX REV CODE 258: Performed by: INTERNAL MEDICINE

## 2018-07-13 PROCEDURE — 700105 HCHG RX REV CODE 258

## 2018-07-13 RX ORDER — SODIUM CHLORIDE 9 MG/ML
INJECTION, SOLUTION INTRAVENOUS
Status: COMPLETED
Start: 2018-07-13 | End: 2018-07-13

## 2018-07-13 RX ORDER — MAGNESIUM SULFATE HEPTAHYDRATE 40 MG/ML
4 INJECTION, SOLUTION INTRAVENOUS ONCE
Status: COMPLETED | OUTPATIENT
Start: 2018-07-13 | End: 2018-07-13

## 2018-07-13 RX ADMIN — SODIUM CHLORIDE: 9 INJECTION, SOLUTION INTRAVENOUS at 00:45

## 2018-07-13 RX ADMIN — Medication 325 MG: at 08:22

## 2018-07-13 RX ADMIN — ENOXAPARIN SODIUM 40 MG: 100 INJECTION SUBCUTANEOUS at 06:12

## 2018-07-13 RX ADMIN — SODIUM CHLORIDE: 9 INJECTION, SOLUTION INTRAVENOUS at 17:51

## 2018-07-13 RX ADMIN — CEFDINIR 300 MG: 300 CAPSULE ORAL at 06:12

## 2018-07-13 RX ADMIN — CEFDINIR 300 MG: 300 CAPSULE ORAL at 17:53

## 2018-07-13 RX ADMIN — MAGNESIUM SULFATE IN WATER 4 G: 40 INJECTION, SOLUTION INTRAVENOUS at 08:26

## 2018-07-13 RX ADMIN — DOCUSATE SODIUM 100 MG: 100 CAPSULE, LIQUID FILLED ORAL at 17:53

## 2018-07-13 RX ADMIN — Medication 325 MG: at 12:12

## 2018-07-13 RX ADMIN — POTASSIUM CHLORIDE: 2 INJECTION, SOLUTION, CONCENTRATE INTRAVENOUS at 20:58

## 2018-07-13 RX ADMIN — MAGNESIUM GLUCONATE 500 MG ORAL TABLET 400 MG: 500 TABLET ORAL at 06:12

## 2018-07-13 RX ADMIN — OXYCODONE HYDROCHLORIDE 10 MG: 10 TABLET ORAL at 12:17

## 2018-07-13 RX ADMIN — OXYCODONE HYDROCHLORIDE 5 MG: 5 TABLET ORAL at 17:53

## 2018-07-13 RX ADMIN — Medication 325 MG: at 17:53

## 2018-07-13 ASSESSMENT — PAIN SCALES - GENERAL
PAINLEVEL_OUTOF10: 6
PAINLEVEL_OUTOF10: 5
PAINLEVEL_OUTOF10: 5
PAINLEVEL_OUTOF10: 8

## 2018-07-13 ASSESSMENT — ENCOUNTER SYMPTOMS
EYES NEGATIVE: 1
CHILLS: 0
PSYCHIATRIC NEGATIVE: 1
SHORTNESS OF BREATH: 0
ABDOMINAL PAIN: 1
MUSCULOSKELETAL NEGATIVE: 1
RESPIRATORY NEGATIVE: 1
HEADACHES: 0
SEIZURES: 0
WEAKNESS: 1
VOMITING: 0
CONSTIPATION: 0
PALPITATIONS: 0
NERVOUS/ANXIOUS: 0
CARDIOVASCULAR NEGATIVE: 1
MYALGIAS: 0
NAUSEA: 0
FEVER: 0
CLAUDICATION: 0
HEMOPTYSIS: 0

## 2018-07-13 NOTE — PROGRESS NOTES
Pharmacy TPN Day # 13       2018    Dosing Weight   67 kg  TPN currently providing 75% of goal (12 hr cyclic TPN)      TPN goal: 5035-6014 kcal/day including 1.2-1.5 gm/kg/day Protein                                                  TPN indication: SBO     Pertinent PMH: Patient has recent [facility] of [facility], patient was discharged and had recurrence of abdominal pain with vomiting. With poor nutrition over preceding weeks, limited signs of current bowel function and anticipated poor enteral access in coming days, TPN initiated for nutritional support     Temp (24hrs), Av.2 °C (98.9 °F), Min:36.8 °C (98.3 °F), Max:37.7 °C (99.9 °F)  .  Recent Labs      18   0335  18   0410  18   0333   SODIUM   --   136   --    POTASSIUM   --   3.9   --    CHLORIDE   --   104   --    CO2   --   27   --    BUN   --   18   --    CREATININE   --   0.81   --    GLUCOSE   --   104*   --    CALCIUM   --   7.7*   --    ASTSGOT   --   47*   --    ALTSGPT   --   79*   --    ALBUMIN   --   1.9*   --    TBILIRUBIN   --   1.2   --    PHOSPHORUS  3.4  3.6  3.6   MAGNESIUM   --   1.3*  1.5     Accu-Checks  Recent Labs      18   1802  18   2241  18   0829   POCGLUCOSE  104*  142*  118*       Vitals:    18 1615 18 1925 18 0455 18 0700   BP: 130/78 138/85 121/72 140/86   Weight:   74.9 kg (165 lb 2 oz)    Height:           Intake/Output Summary (Last 24 hours) at 18 1055  Last data filed at 18 0830   Gross per 24 hour   Intake             4709 ml   Output             3375 ml   Net             1334 ml       Orders Placed This Encounter   Procedures   • Diet Order Regular     Standing Status:   Standing     Number of Occurrences:   1     Order Specific Question:   Diet:     Answer:   Regular [1]         TPN for past 72 hours (Show up to 3 orders; newest on the left. Changes between the two most recent orders are indicated.)     Start date and time    07/12/2018 2000 07/08/2018 2000 07/05/2018 2000      TPN Central Line Formulation [[id number]] TPN Central Line Formulation [[id number]] TPN Central Line Formulation [[id number]]    Order Status  Active Completed Completed    Last Given  07/12/2018 2028 07/11/2018 2004 07/07/2018 2017       Base    Clinisol 15%  75 g 75 g 90 g    dextrose 70%  166 g 166 g 270 g    fat emulsions 20%  39 g 39 g 40 g       Additives    potassium phosphates  30 mmol 30 mmol 30 mmol    potassium chloride  80 mEq 80 mEq 60 mEq    sodium acetate  70 mEq 100 mEq 100 mEq    sodium chloride  145 mEq 200 mEq 200 mEq    calcium GLUConate  4.65 mEq 4.65 mEq 4.65 mEq    M.T.E. -5 Adult  1 mL 1 mL 1 mL    M.V.I. ADULT  10 mL 10 mL 10 mL    famotidine  40 mg 40 mg 40 mg       Energy Contribution    Proteins  -- -- --    Dextrose  -- -- --    Lipids  -- -- --    Total  0 kcal 0 kcal 0 kcal       Electrolyte Ion Calculated Amount    Sodium  -- -- --    Potassium  -- -- --    Calcium  -- -- --    Magnesium  -- -- --    Aluminum  -- -- --    Phosphate  -- -- --    Chloride  -- -- --    Acetate  -- -- --       Other    Total Protein  -- -- --    Total Protein/kg  -- -- --    Glucose Infusion Rate  -- -- --    Osmolarity  -- -- --    Volume  1,400 mL 1,992 mL 1,992 mL    Rate   mL/hr 83 mL/hr 83 mL/hr    Dosing Weight  74.1 kg 66.8 kg 66.8 kg    Infusion Site  Central Central Central            This formula provides:  % kcal as lipids = 31  Grams protein/kg = 1.1  Non-protein calories = 954  Kcals/kg = 19  Total daily calories = 1254    Comments:  1) Pt transitioned to a 12 hr cyclic TPN with last night's bag. FSBS look good.  2) Mag level at 1.5. Mag 4 g ivpb ordered. Mag needs to be replaced outside of TPN due to national shortage of mag iv.  3) Patient on regular diet (documented <25%) with oral nutrition supplementation (50-75%). Continue TPN at 75% calorie goal. No changes made to macronutrients in TPN  4) Continue current 12 hr cyclic TPN  formulation      Jesus Tracey, PharmD

## 2018-07-13 NOTE — PROGRESS NOTES
"Pt states pain 6/10 but declines medication at this time stating \"I want to take it at 8 near my bedtime\"  "

## 2018-07-13 NOTE — PROGRESS NOTES
Report received from day RN. Assumed care at 1915.  A/O x4. Calls appropriately before getting OOB.  On 1L NC. Saturation above 90%.  Reports pain 6/10 out of 0-10 pain scale at this time. Medicated per pain.  No c/o of N/V. Tolerating regular diet fine. Initiation of TPN-cyclic infusion. New bag hung and verified with 2nd RN.  (+) flatus, (+) BM, normoactive BS. RLQ urostomy in place, (+) urine output through urostomy. Attached to down drain overnight.  Noted: midline abdomen incision with staples and open to air, approximated. However noted some scant pus drainage noted near umbilicus.  Ambulates with one person using front wheel walker, no attempts to get out of bed this evening. Fatigue.  SCDs on. VSS.  Reviewed POC for evening. All questions answered.   Call light within reach. Bed at lowest position w/ bed brakes locked. Hourly rounding in place.

## 2018-07-13 NOTE — PROGRESS NOTES
[facility] Hospitalist Progress Note    Date of Service: 2018    Chief Complaint  83 y.o. male admitted 2018 with abdominal pain.    Interval Problem Update  Postoperative, exploratory laparotomy.  Patient at this point is doing well pain is down to 2 out of 10.  He has had several bowel movements through the night and bowel sounds at this point are adequate.  Patient will be able to discharge home once he is eating at least 50% of his meals, currently remains on TPN which we will wean off     age that he did not want to eat breakfast today because the food makes him nauseous  Appears a bit more agitated today, stating that he will eat fine once he goes home  Discussed plan of care regarding ongoing TPN needs due to nutritional needs  Otherwise patient appears to have improved strength  Encouraged intake of Ensure  We'll likely need discharge to home with TPN  Denies abdominal pain    Consultants/Specialty  Surgery    Disposition  To home in 1-2 days with clinical improvement with home health and TPN  Accepted by home health, tpn pending  Out of bed 3 times a day  Encourage activity  FWW        Review of Systems   Constitutional: Negative for chills and fever.   Eyes: Negative.    Respiratory: Negative.  Negative for hemoptysis and shortness of breath.    Cardiovascular: Negative.  Negative for palpitations and claudication.   Gastrointestinal: Positive for abdominal pain (2/10). Negative for constipation, nausea and vomiting.        Passing gas, pain 4/10 but off the pain medications   Genitourinary:        Urostomy   Musculoskeletal: Negative.  Negative for myalgias.   Skin: Negative.    Neurological: Positive for weakness. Negative for seizures and headaches.   Endo/Heme/Allergies: Negative.    Psychiatric/Behavioral: Negative.  The patient is not nervous/anxious.    All other systems reviewed and are negative.     Physical Exam  Laboratory/Imaging   Hemodynamics  Temp (24hrs), Av.2 °C (98.9 °F),  Min:36.8 °C (98.3 °F), Max:37.7 °C (99.9 °F)   Temperature: 36.9 °C (98.5 °F)  Pulse  Av.3  Min: 41  Max: 125    Blood Pressure : 140/86      Respiratory      Respiration: 17, Pulse Oximetry: 92 %     Work Of Breathing / Effort: Mild  RUL Breath Sounds: Clear, RML Breath Sounds: Diminished, RLL Breath Sounds: Diminished, LEXA Breath Sounds: Clear, LLL Breath Sounds: Diminished    Fluids    Intake/Output Summary (Last 24 hours) at 18 1212  Last data filed at 18 0830   Gross per 24 hour   Intake             3797 ml   Output             2800 ml   Net              997 ml       Nutrition  Orders Placed This Encounter   Procedures   • Diet Order Regular     Standing Status:   Standing     Number of Occurrences:   1     Order Specific Question:   Diet:     Answer:   Regular [1]     Physical Exam   Constitutional: He is oriented to person, place, and time. No distress.   HENT:   Head: Normocephalic and atraumatic.   Right Ear: External ear normal.   Left Ear: External ear normal.   Eyes: EOM are normal. Pupils are equal, round, and reactive to light.   Cardiovascular: Normal rate, regular rhythm and intact distal pulses.    Pulmonary/Chest: Effort normal and breath sounds normal. No respiratory distress. He has no wheezes. He has no rales.   Abdominal: Soft. He exhibits no distension. Bowel sounds are decreased. There is no tenderness (2/10).       Midline wound with improving surrounding erythema, improved   Musculoskeletal: Normal range of motion. He exhibits no edema.   Neurological: He is alert and oriented to person, place, and time. He has normal reflexes. No cranial nerve deficit. Coordination normal.   Skin: Skin is warm and dry. No pallor.   Psychiatric: He has a normal mood and affect. His behavior is normal. Judgment and thought content normal.   Nursing note and vitals reviewed.      Recent Labs      18   0335  18   0410  18   0839   WBC  16.7*  13.2*  13.3*   RBC  2.03*  2.71*   2.83*   HEMOGLOBIN  6.2*  8.0*  8.4*   HEMATOCRIT  19.0*  25.1*  25.9*   MCV  93.6  92.6  91.5   MCH  30.5  29.5  29.7   MCHC  32.6*  31.9*  32.4*   RDW  58.4*  56.3*  55.6*   PLATELETCT  456*  431  407   MPV  9.9  9.4  8.9*     Recent Labs      07/12/18   0410   SODIUM  136   POTASSIUM  3.9   CHLORIDE  104   CO2  27   GLUCOSE  104*   BUN  18   CREATININE  0.81   CALCIUM  7.7*                      Assessment/Plan     * SBO (small bowel obstruction) (HCC)- (present on admission)   Assessment & Plan    Postoperative day #12 after exploratory laparotomy.    + BM  At this point the patient remains on TPN for nutritional support but this can be discontinued as long as he starts eating.  He is only eating about 10% of his meals right now if he gets to about 50% at that point he will be able to discharge home.        Encourage activity/oob  Tolerating 50% of meals now  Cyclical/ evening TPN    7/13 cellulitis at surgical wound site  erythema at site improved  Discharge resolved  On antibiotics            Hypokalemia   Assessment & Plan    Resolved        Normocytic anemia- (present on admission)   Assessment & Plan    h/h stable  s/p  1 u prbc   Iron <10,   On oral supplements  monitor        Renal insufficiency   Assessment & Plan    Resolved        Severe protein-calorie malnutrition (HCC)- (present on admission)   Assessment & Plan    For right now only eating about 10% of meals continue TPN        Hypomagnesemia   Assessment & Plan    oral supplements        Leukocytosis   Assessment & Plan    Secondary to surgical wound cellulitis  improving today  - CXR- bilateral atelectasis          T2DM (type 2 diabetes mellitus) (HCC)- (present on admission)   Assessment & Plan    At this point insulin is in the TPN and we are continuing at this point to monitor blood sugars  Blood sugar controlled  discontinue Accu-Chek        HLD (hyperlipidemia)- (present on admission)   Assessment & Plan    At this point patient will be able  to restart his statins as long as liver functions are normal  Lab Results   Component Value Date/Time    CHOLSTRLTOT 78 (L) 07/02/2018 03:35 AM    TRIGLYCERIDE 121 07/02/2018 03:35 AM              HTN (hypertension)- (present on admission)   Assessment & Plan    Continue with medical optimization of his blood pressure management most recent blood pressure 140/82          Quality-Core Measures   Reviewed items::  EKG reviewed, Labs reviewed, Medications reviewed and Radiology images reviewed  Carter catheter::  Neurogenic Bladder  DVT prophylaxis pharmacological::  Enoxaparin (Lovenox)  Ulcer Prophylaxis::  Not indicated  Assessed for rehabilitation services:  Patient returned to prior level of function, rehabilitation not indicated at this time

## 2018-07-13 NOTE — CARE PLAN
Problem: Discharge Barriers/Planning  Goal: Patient's continuum of care needs will be met  Outcome: PROGRESSING AS EXPECTED  Updated pt on POC regarding DC, home infusion and HH    Problem: Urinary Elimination:  Goal: Ability to reestablish a normal urinary elimination pattern will improve  Outcome: PROGRESSING AS EXPECTED  Urostomy drained

## 2018-07-14 LAB
ANION GAP SERPL CALC-SCNC: 7 MMOL/L (ref 0–11.9)
BACTERIA WND AEROBE CULT: ABNORMAL
BASOPHILS # BLD AUTO: 0.3 % (ref 0–1.8)
BASOPHILS # BLD: 0.03 K/UL (ref 0–0.12)
BUN SERPL-MCNC: 20 MG/DL (ref 8–22)
CALCIUM SERPL-MCNC: 7.4 MG/DL (ref 8.5–10.5)
CHLORIDE SERPL-SCNC: 100 MMOL/L (ref 96–112)
CO2 SERPL-SCNC: 27 MMOL/L (ref 20–33)
CREAT SERPL-MCNC: 0.63 MG/DL (ref 0.5–1.4)
EOSINOPHIL # BLD AUTO: 0.15 K/UL (ref 0–0.51)
EOSINOPHIL NFR BLD: 1.4 % (ref 0–6.9)
ERYTHROCYTE [DISTWIDTH] IN BLOOD BY AUTOMATED COUNT: 55.4 FL (ref 35.9–50)
GLUCOSE BLD-MCNC: 157 MG/DL (ref 65–99)
GLUCOSE SERPL-MCNC: 151 MG/DL (ref 65–99)
GRAM STN SPEC: ABNORMAL
HCT VFR BLD AUTO: 23 % (ref 42–52)
HGB BLD-MCNC: 7.6 G/DL (ref 14–18)
IMM GRANULOCYTES # BLD AUTO: 0.17 K/UL (ref 0–0.11)
IMM GRANULOCYTES NFR BLD AUTO: 1.6 % (ref 0–0.9)
LYMPHOCYTES # BLD AUTO: 0.7 K/UL (ref 1–4.8)
LYMPHOCYTES NFR BLD: 6.4 % (ref 22–41)
MAGNESIUM SERPL-MCNC: 1.6 MG/DL (ref 1.5–2.5)
MCH RBC QN AUTO: 30.5 PG (ref 27–33)
MCHC RBC AUTO-ENTMCNC: 33 G/DL (ref 33.7–35.3)
MCV RBC AUTO: 92.4 FL (ref 81.4–97.8)
MONOCYTES # BLD AUTO: 1.36 K/UL (ref 0–0.85)
MONOCYTES NFR BLD AUTO: 12.5 % (ref 0–13.4)
NEUTROPHILS # BLD AUTO: 8.47 K/UL (ref 1.82–7.42)
NEUTROPHILS NFR BLD: 77.8 % (ref 44–72)
NRBC # BLD AUTO: 0 K/UL
NRBC BLD-RTO: 0 /100 WBC
PHOSPHATE SERPL-MCNC: 3.2 MG/DL (ref 2.5–4.5)
PLATELET # BLD AUTO: 370 K/UL (ref 164–446)
PMV BLD AUTO: 9.2 FL (ref 9–12.9)
POTASSIUM SERPL-SCNC: 4.5 MMOL/L (ref 3.6–5.5)
RBC # BLD AUTO: 2.49 M/UL (ref 4.7–6.1)
SIGNIFICANT IND 70042: ABNORMAL
SITE SITE: ABNORMAL
SODIUM SERPL-SCNC: 134 MMOL/L (ref 135–145)
SOURCE SOURCE: ABNORMAL
WBC # BLD AUTO: 10.9 K/UL (ref 4.8–10.8)

## 2018-07-14 PROCEDURE — 770001 HCHG ROOM/CARE - MED/SURG/GYN PRIV*

## 2018-07-14 PROCEDURE — 83735 ASSAY OF MAGNESIUM: CPT

## 2018-07-14 PROCEDURE — 90471 IMMUNIZATION ADMIN: CPT

## 2018-07-14 PROCEDURE — 700102 HCHG RX REV CODE 250 W/ 637 OVERRIDE(OP): Performed by: INTERNAL MEDICINE

## 2018-07-14 PROCEDURE — 700105 HCHG RX REV CODE 258: Performed by: HOSPITALIST

## 2018-07-14 PROCEDURE — 84100 ASSAY OF PHOSPHORUS: CPT

## 2018-07-14 PROCEDURE — A9270 NON-COVERED ITEM OR SERVICE: HCPCS | Performed by: INTERNAL MEDICINE

## 2018-07-14 PROCEDURE — 99232 SBSQ HOSP IP/OBS MODERATE 35: CPT | Performed by: INTERNAL MEDICINE

## 2018-07-14 PROCEDURE — 700102 HCHG RX REV CODE 250 W/ 637 OVERRIDE(OP): Performed by: SURGERY

## 2018-07-14 PROCEDURE — A9270 NON-COVERED ITEM OR SERVICE: HCPCS | Performed by: SURGERY

## 2018-07-14 PROCEDURE — 700111 HCHG RX REV CODE 636 W/ 250 OVERRIDE (IP): Performed by: INTERNAL MEDICINE

## 2018-07-14 PROCEDURE — 700105 HCHG RX REV CODE 258: Performed by: INTERNAL MEDICINE

## 2018-07-14 PROCEDURE — 80048 BASIC METABOLIC PNL TOTAL CA: CPT

## 2018-07-14 PROCEDURE — 700101 HCHG RX REV CODE 250: Performed by: INTERNAL MEDICINE

## 2018-07-14 PROCEDURE — 90670 PCV13 VACCINE IM: CPT | Performed by: INTERNAL MEDICINE

## 2018-07-14 PROCEDURE — 700105 HCHG RX REV CODE 258

## 2018-07-14 PROCEDURE — 700111 HCHG RX REV CODE 636 W/ 250 OVERRIDE (IP): Performed by: SURGERY

## 2018-07-14 PROCEDURE — 85025 COMPLETE CBC W/AUTO DIFF WBC: CPT

## 2018-07-14 PROCEDURE — 82962 GLUCOSE BLOOD TEST: CPT

## 2018-07-14 PROCEDURE — 700112 HCHG RX REV CODE 229: Performed by: SURGERY

## 2018-07-14 RX ORDER — SODIUM CHLORIDE 9 MG/ML
INJECTION, SOLUTION INTRAVENOUS
Status: COMPLETED
Start: 2018-07-14 | End: 2018-07-14

## 2018-07-14 RX ADMIN — Medication 325 MG: at 07:13

## 2018-07-14 RX ADMIN — SODIUM CHLORIDE: 9 INJECTION, SOLUTION INTRAVENOUS at 17:36

## 2018-07-14 RX ADMIN — PNEUMOCOCCAL 13-VALENT CONJUGATE VACCINE 0.5 ML: 2.2; 2.2; 2.2; 2.2; 2.2; 4.4; 2.2; 2.2; 2.2; 2.2; 2.2; 2.2; 2.2 INJECTION, SUSPENSION INTRAMUSCULAR at 10:00

## 2018-07-14 RX ADMIN — ENOXAPARIN SODIUM 40 MG: 100 INJECTION SUBCUTANEOUS at 05:14

## 2018-07-14 RX ADMIN — OXYCODONE HYDROCHLORIDE 10 MG: 10 TABLET ORAL at 05:58

## 2018-07-14 RX ADMIN — CEFDINIR 300 MG: 300 CAPSULE ORAL at 17:39

## 2018-07-14 RX ADMIN — DOCUSATE SODIUM 100 MG: 100 CAPSULE, LIQUID FILLED ORAL at 19:35

## 2018-07-14 RX ADMIN — METOPROLOL TARTRATE 25 MG: 25 TABLET, FILM COATED ORAL at 17:37

## 2018-07-14 RX ADMIN — Medication 325 MG: at 17:37

## 2018-07-14 RX ADMIN — POLYETHYLENE GLYCOL 3350 1 PACKET: 17 POWDER, FOR SOLUTION ORAL at 05:57

## 2018-07-14 RX ADMIN — Medication 325 MG: at 10:00

## 2018-07-14 RX ADMIN — DOCUSATE SODIUM 100 MG: 100 CAPSULE, LIQUID FILLED ORAL at 05:14

## 2018-07-14 RX ADMIN — MAGNESIUM GLUCONATE 500 MG ORAL TABLET 400 MG: 500 TABLET ORAL at 05:14

## 2018-07-14 RX ADMIN — POTASSIUM CHLORIDE: 2 INJECTION, SOLUTION, CONCENTRATE INTRAVENOUS at 20:01

## 2018-07-14 RX ADMIN — SODIUM CHLORIDE 500 ML: 9 INJECTION, SOLUTION INTRAVENOUS at 05:18

## 2018-07-14 RX ADMIN — METOPROLOL TARTRATE 25 MG: 25 TABLET, FILM COATED ORAL at 10:00

## 2018-07-14 RX ADMIN — CEFDINIR 300 MG: 300 CAPSULE ORAL at 05:14

## 2018-07-14 ASSESSMENT — ENCOUNTER SYMPTOMS
ABDOMINAL PAIN: 1
MYALGIAS: 0
VOMITING: 0
CONSTIPATION: 0
PSYCHIATRIC NEGATIVE: 1
FEVER: 0
BACK PAIN: 0
WEAKNESS: 1
SEIZURES: 0
CLAUDICATION: 0
DEPRESSION: 0
SHORTNESS OF BREATH: 0
CHILLS: 0
NERVOUS/ANXIOUS: 0
NAUSEA: 0
HEARTBURN: 0
CARDIOVASCULAR NEGATIVE: 1
MUSCULOSKELETAL NEGATIVE: 1
RESPIRATORY NEGATIVE: 1
EYES NEGATIVE: 1

## 2018-07-14 NOTE — PROGRESS NOTES
[facility] Hospitalist Progress Note    Date of Service: 7/14/2018    Chief Complaint  83 y.o. male admitted 6/26/2018 with abdominal pain.    Interval Problem Update  Postoperative, exploratory laparotomy.  Patient at this point is doing well pain is down to 2 out of 10.  He has had several bowel movements through the night and bowel sounds at this point are adequate.  Patient will be able to discharge home once he is eating at least 50% of his meals, currently remains on TPN which we will wean off    7/13 age that he did not want to eat breakfast today because the food makes him nauseous  Appears a bit more agitated today, stating that he will eat fine once he goes home  Discussed plan of care regarding ongoing TPN needs due to nutritional needs  Otherwise patient appears to have improved strength  Encouraged intake of Ensure  We'll likely need discharge to home with TPN  Denies abdominal pain    7/14  States he does not like food offered here, and refusing to eat breakfast  Denies abd pain      Consultants/Specialty  Surgery    Disposition  To home in 1-2 days with clinical improvement with home health and TPN  Accepted by home health, tpn pending  Out of bed 3 times a day  Encourage activity  FWW        Review of Systems   Constitutional: Negative for chills, fever and malaise/fatigue.   Eyes: Negative.    Respiratory: Negative.  Negative for shortness of breath.    Cardiovascular: Negative.  Negative for chest pain and claudication.   Gastrointestinal: Positive for abdominal pain (2/10). Negative for constipation, heartburn, nausea and vomiting.        Passing gas, pain 4/10 but off the pain medications   Genitourinary:        Urostomy   Musculoskeletal: Negative.  Negative for back pain and myalgias.   Skin: Negative.    Neurological: Positive for weakness. Negative for seizures.   Endo/Heme/Allergies: Negative.    Psychiatric/Behavioral: Negative.  Negative for depression. The patient is not nervous/anxious.     All other systems reviewed and are negative.     Physical Exam  Laboratory/Imaging   Hemodynamics  Temp (24hrs), Av.1 °C (98.8 °F), Min:36.6 °C (97.9 °F), Max:37.6 °C (99.7 °F)   Temperature: 37.1 °C (98.8 °F)  Pulse  Av.7  Min: 41  Max: 125    Blood Pressure : 143/85      Respiratory      Respiration: 16, Pulse Oximetry: 96 %     Work Of Breathing / Effort: Mild  RUL Breath Sounds: Clear, RML Breath Sounds: Diminished, RLL Breath Sounds: Diminished, LEXA Breath Sounds: Clear, LLL Breath Sounds: Diminished    Fluids    Intake/Output Summary (Last 24 hours) at 18 1141  Last data filed at 18 0830   Gross per 24 hour   Intake             3845 ml   Output             2530 ml   Net             1315 ml       Nutrition  Orders Placed This Encounter   Procedures   • Diet Order Regular     Standing Status:   Standing     Number of Occurrences:   1     Order Specific Question:   Diet:     Answer:   Regular [1]     Physical Exam   Constitutional: He is oriented to person, place, and time. No distress.   HENT:   Head: Normocephalic and atraumatic.   Mouth/Throat: No oropharyngeal exudate.   Eyes: EOM are normal. Pupils are equal, round, and reactive to light. No scleral icterus.   Cardiovascular: Normal rate, regular rhythm and intact distal pulses.    Pulmonary/Chest: Effort normal and breath sounds normal. No respiratory distress. He has no wheezes.   Abdominal: Soft. He exhibits no distension. Bowel sounds are decreased. There is no tenderness (2/10).       Midline wound with improving surrounding erythema, improved   Musculoskeletal: Normal range of motion. He exhibits no edema.   Neurological: He is alert and oriented to person, place, and time. He has normal reflexes. No cranial nerve deficit. He exhibits normal muscle tone.   Skin: Skin is warm and dry. He is not diaphoretic.   Psychiatric: He has a normal mood and affect. His behavior is normal. Judgment and thought content normal.   Nursing note  and vitals reviewed.      Recent Labs      07/12/18   0410  07/13/18   0839  07/14/18   0350   WBC  13.2*  13.3*  10.9*   RBC  2.71*  2.83*  2.49*   HEMOGLOBIN  8.0*  8.4*  7.6*   HEMATOCRIT  25.1*  25.9*  23.0*   MCV  92.6  91.5  92.4   MCH  29.5  29.7  30.5   MCHC  31.9*  32.4*  33.0*   RDW  56.3*  55.6*  55.4*   PLATELETCT  431  407  370   MPV  9.4  8.9*  9.2     Recent Labs      07/12/18   0410  07/14/18   0350   SODIUM  136  134*   POTASSIUM  3.9  4.5   CHLORIDE  104  100   CO2  27  27   GLUCOSE  104*  151*   BUN  18  20   CREATININE  0.81  0.63   CALCIUM  7.7*  7.4*                      Assessment/Plan     * SBO (small bowel obstruction) (HCC)- (present on admission)   Assessment & Plan    Postoperative day #13 after exploratory laparotomy.    7/14   At this point the patient remains on TPN for nutritional support , apparently he does not like the food offered by the facility, but was able to tolerate [employer] per RN  Cont cyclical TPN      7/13 cellulitis at surgical wound site  erythema at site improved  Discharge resolved  On antibiotics  + Bm 7/12              Hypokalemia   Assessment & Plan    Resolved        Normocytic anemia- (present on admission)   Assessment & Plan    h/h stable  s/p  1 u prbc   Iron <10,   On oral supplements  monitor        Renal insufficiency   Assessment & Plan    Resolved        Severe protein-calorie malnutrition (HCC)- (present on admission)   Assessment & Plan    For right now only eating about 10% of meals continue TPN        Hypomagnesemia   Assessment & Plan    oral supplements        Leukocytosis   Assessment & Plan    Secondary to surgical wound cellulitis  improving today  - CXR- bilateral atelectasis          T2DM (type 2 diabetes mellitus) (HCC)- (present on admission)   Assessment & Plan    At this point insulin is in the TPN and we are continuing at this point to monitor blood sugars  Blood sugar controlled  discontinue Accu-Chek        HLD (hyperlipidemia)- (present on  admission)   Assessment & Plan    At this point patient will be able to restart his statins as long as liver functions are normal  Lab Results   Component Value Date/Time    CHOLSTRLTOT 78 (L) 07/02/2018 03:35 AM    TRIGLYCERIDE 121 07/02/2018 03:35 AM              HTN (hypertension)- (present on admission)   Assessment & Plan    Continue with medical optimization  Increase bb          Quality-Core Measures   Reviewed items::  EKG reviewed, Labs reviewed, Medications reviewed and Radiology images reviewed  Carter catheter::  Neurogenic Bladder  DVT prophylaxis pharmacological::  Enoxaparin (Lovenox)  Ulcer Prophylaxis::  Not indicated  Assessed for rehabilitation services:  Patient returned to prior level of function, rehabilitation not indicated at this time

## 2018-07-14 NOTE — PROGRESS NOTES
Pt aox4. No visible signs of distress. VSS.  Tolerating medication regimen without any signs or symptoms of adverse reactions.  Pt states pain is well managed at this time.  No reports of n/v. Tolerating IV TPN.  No signs or symptoms of hypoglycemia.  Labs drawn from PICC line.  Urostomy patent and draining to gravity.  Midline incision open to air. No decline in presentation.  +BS, +BM, +flatus  Bed locked and in low position.  Call light within reach and able to make all needs be known.  Will continue to monitor.

## 2018-07-14 NOTE — CARE PLAN
Problem: Safety  Goal: Will remain free from injury  Outcome: PROGRESSING AS EXPECTED  Pt room close to nursing station     Problem: Bowel/Gastric:  Goal: Normal bowel function is maintained or improved  Outcome: PROGRESSING AS EXPECTED  Bowel protocol advanced per pt request

## 2018-07-14 NOTE — PROGRESS NOTES
Bedside report received.  Assessment complete.  A&O x 4. Patient calls appropriately.  Patient up with 1 assist. Bed alarm NI.   Patient has 0/10 pain.   Denies N&V. Tolerating regular diet and cyclic TPN. Poor appetite.  + void to urostomy, + flatus  Patient denies SOB.  SCD's in place.  Patient requesting MOM. Miralax and docusate given. LBM 7/12, hyperactive bowel sounds.   Review plan with of care with patient. Call light and personal belongings with in reach. Hourly rounding in place. All needs met at this time.

## 2018-07-14 NOTE — PROGRESS NOTES
Pharmacy TPN Day # 14       2018    Dosing Weight   67 kg  TPN currently providing 75% of goal      TPN goal: 6460-3188 kcal/day including 1.2-1.5 gm/kg/day Protein      TPN indication: SBO         Pertinent PMH: Patient has recent history of admission to [facility] for SBO, patient was discharged and had recurrence of abdominal pain with vomiting. With poor nutrition over preceding weeks, limited signs of current bowel function and anticipated poor enteral intake, thus, TPN initiated for nutritional support until patient able to tolerate oral diet.    Temp (24hrs), Av.1 °C (98.8 °F), Min:36.6 °C (97.9 °F), Max:37.6 °C (99.7 °F)    Recent Labs      18   0410  18   0333  18   0350   SODIUM  136   --   134*   POTASSIUM  3.9   --   4.5   CHLORIDE  104   --   100   CO2  27   --   27   BUN  18   --   20   CREATININE  0.81   --   0.63   GLUCOSE  104*   --   151*   CALCIUM  7.7*   --   7.4*   ASTSGOT  47*   --    --    ALTSGPT  79*   --    --    ALBUMIN  1.9*   --    --    TBILIRUBIN  1.2   --    --    PHOSPHORUS  3.6  3.6  3.2   MAGNESIUM  1.3*  1.5  1.6     Accu-Checks  Recent Labs      18   1756  18   2305  18   0516   POCGLUCOSE  117*  147*  157*       Vitals:    18 1505 18 1905 18 0310 18 0655   BP: 106/68 128/77 142/82 143/85   Weight:       Height:           Intake/Output Summary (Last 24 hours) at 18 1154  Last data filed at 18 0830   Gross per 24 hour   Intake             3845 ml   Output             2530 ml   Net             1315 ml       Orders Placed This Encounter   Procedures   • Diet Order Regular     Standing Status:   Standing     Number of Occurrences:   1     Order Specific Question:   Diet:     Answer:   Regular [1]         TPN for past 72 hours (Show up to 3 orders; newest on the left. Changes between the two most recent orders are indicated.)     Start date and time   2018       TPN Central Line Formulation [[id number]] TPN Central Line Formulation [[id number]] TPN Central Line Formulation [[id number]]    Order Status  Active Completed Completed    Last Given  07/13/2018 2058 07/11/2018 2004 07/07/2018 2017       Base    Clinisol 15%  75 g 75 g 90 g    dextrose 70%  166 g 166 g 270 g    fat emulsions 20%  39 g 39 g 40 g       Additives    potassium phosphates  30 mmol 30 mmol 30 mmol    potassium chloride  80 mEq 80 mEq 60 mEq    sodium acetate  70 mEq 100 mEq 100 mEq    sodium chloride  145 mEq 200 mEq 200 mEq    calcium GLUConate  4.65 mEq 4.65 mEq 4.65 mEq    M.T.E. -5 Adult  1 mL 1 mL 1 mL    M.V.I. ADULT  10 mL 10 mL 10 mL    famotidine  40 mg 40 mg 40 mg       Other    Volume  1,400 mL 1,992 mL 1,992 mL    Rate   mL/hr 83 mL/hr 83 mL/hr    Dosing Weight  74.1 kg 66.8 kg 66.8 kg    Infusion Site  Central Central Central          This formula provides:  % kcal as lipids = 31  Grams protein/kg = 1.1  Non-protein calories = 954  Kcals/kg = 19  Total daily calories = 1254    Comments:  1. Patient stable after transition to cyclic TPN infusing over 12 hours daily. Patient refusing meals at [facility] but reportedly tolerated food brought in by family.   2. Micronutrients/Electrolytes: Patient's sodium slightly low this AM, sodium in TPN formulation currently 0.9% NS equivalent. Will continue to trend sodium levels prior to making adjustments. All other electrolytes wnl - no changes to next TPN formulation.  3. Macronutrients/glycemic control: BG , no changes to macronutrients in TPN. Will continue to monitor glycemic tolerance of formulation and consider addition of SSI coverage if appropriate.  4. Will continue TPN at 75% of estimated nutritional requirements and follow tolerance of enteral nutrition for further adjustments to TPN as needed.    Pharmacy will continue to follow.     Mckenna Sutton PharmD

## 2018-07-15 LAB
ERYTHROCYTE [DISTWIDTH] IN BLOOD BY AUTOMATED COUNT: 53.8 FL (ref 35.9–50)
GLUCOSE BLD-MCNC: 134 MG/DL (ref 65–99)
HCT VFR BLD AUTO: 23 % (ref 42–52)
HGB BLD-MCNC: 7.5 G/DL (ref 14–18)
MAGNESIUM SERPL-MCNC: 1.4 MG/DL (ref 1.5–2.5)
MCH RBC QN AUTO: 30 PG (ref 27–33)
MCHC RBC AUTO-ENTMCNC: 32.6 G/DL (ref 33.7–35.3)
MCV RBC AUTO: 92 FL (ref 81.4–97.8)
PHOSPHATE SERPL-MCNC: 3.3 MG/DL (ref 2.5–4.5)
PLATELET # BLD AUTO: 375 K/UL (ref 164–446)
PMV BLD AUTO: 9.3 FL (ref 9–12.9)
RBC # BLD AUTO: 2.5 M/UL (ref 4.7–6.1)
WBC # BLD AUTO: 10.9 K/UL (ref 4.8–10.8)

## 2018-07-15 PROCEDURE — 700102 HCHG RX REV CODE 250 W/ 637 OVERRIDE(OP): Performed by: INTERNAL MEDICINE

## 2018-07-15 PROCEDURE — 700105 HCHG RX REV CODE 258: Performed by: HOSPITALIST

## 2018-07-15 PROCEDURE — 84100 ASSAY OF PHOSPHORUS: CPT

## 2018-07-15 PROCEDURE — 700111 HCHG RX REV CODE 636 W/ 250 OVERRIDE (IP): Performed by: SURGERY

## 2018-07-15 PROCEDURE — 82962 GLUCOSE BLOOD TEST: CPT

## 2018-07-15 PROCEDURE — 700111 HCHG RX REV CODE 636 W/ 250 OVERRIDE (IP): Performed by: INTERNAL MEDICINE

## 2018-07-15 PROCEDURE — A9270 NON-COVERED ITEM OR SERVICE: HCPCS | Performed by: SURGERY

## 2018-07-15 PROCEDURE — 700102 HCHG RX REV CODE 250 W/ 637 OVERRIDE(OP): Performed by: SURGERY

## 2018-07-15 PROCEDURE — 99232 SBSQ HOSP IP/OBS MODERATE 35: CPT | Performed by: INTERNAL MEDICINE

## 2018-07-15 PROCEDURE — 700112 HCHG RX REV CODE 229: Performed by: SURGERY

## 2018-07-15 PROCEDURE — A9270 NON-COVERED ITEM OR SERVICE: HCPCS | Performed by: INTERNAL MEDICINE

## 2018-07-15 PROCEDURE — 770001 HCHG ROOM/CARE - MED/SURG/GYN PRIV*

## 2018-07-15 PROCEDURE — 83735 ASSAY OF MAGNESIUM: CPT

## 2018-07-15 PROCEDURE — 85027 COMPLETE CBC AUTOMATED: CPT

## 2018-07-15 RX ORDER — OXYCODONE HYDROCHLORIDE 5 MG/1
5 TABLET ORAL
Status: DISCONTINUED | OUTPATIENT
Start: 2018-07-15 | End: 2018-07-18 | Stop reason: HOSPADM

## 2018-07-15 RX ORDER — MAGNESIUM SULFATE HEPTAHYDRATE 40 MG/ML
4 INJECTION, SOLUTION INTRAVENOUS ONCE
Status: COMPLETED | OUTPATIENT
Start: 2018-07-15 | End: 2018-07-15

## 2018-07-15 RX ORDER — OXYCODONE HYDROCHLORIDE 10 MG/1
10 TABLET ORAL EVERY 6 HOURS PRN
Status: DISCONTINUED | OUTPATIENT
Start: 2018-07-15 | End: 2018-07-18 | Stop reason: HOSPADM

## 2018-07-15 RX ADMIN — DOCUSATE SODIUM 100 MG: 100 CAPSULE, LIQUID FILLED ORAL at 17:23

## 2018-07-15 RX ADMIN — OXYCODONE HYDROCHLORIDE 10 MG: 10 TABLET ORAL at 03:18

## 2018-07-15 RX ADMIN — SODIUM CHLORIDE: 9 INJECTION, SOLUTION INTRAVENOUS at 03:18

## 2018-07-15 RX ADMIN — MAGNESIUM GLUCONATE 500 MG ORAL TABLET 400 MG: 500 TABLET ORAL at 05:44

## 2018-07-15 RX ADMIN — Medication 325 MG: at 17:24

## 2018-07-15 RX ADMIN — Medication 325 MG: at 12:33

## 2018-07-15 RX ADMIN — CEFDINIR 300 MG: 300 CAPSULE ORAL at 05:44

## 2018-07-15 RX ADMIN — METOPROLOL TARTRATE 25 MG: 25 TABLET, FILM COATED ORAL at 17:24

## 2018-07-15 RX ADMIN — MAGNESIUM SULFATE IN WATER 4 G: 40 INJECTION, SOLUTION INTRAVENOUS at 12:35

## 2018-07-15 RX ADMIN — POLYETHYLENE GLYCOL 3350 1 PACKET: 17 POWDER, FOR SOLUTION ORAL at 21:04

## 2018-07-15 RX ADMIN — CEFDINIR 300 MG: 300 CAPSULE ORAL at 17:25

## 2018-07-15 RX ADMIN — Medication 325 MG: at 08:17

## 2018-07-15 RX ADMIN — DOCUSATE SODIUM 100 MG: 100 CAPSULE, LIQUID FILLED ORAL at 05:44

## 2018-07-15 RX ADMIN — METOPROLOL TARTRATE 25 MG: 25 TABLET, FILM COATED ORAL at 05:44

## 2018-07-15 RX ADMIN — ENOXAPARIN SODIUM 40 MG: 100 INJECTION SUBCUTANEOUS at 05:45

## 2018-07-15 ASSESSMENT — ENCOUNTER SYMPTOMS
HEARTBURN: 0
CHILLS: 0
DIZZINESS: 0
SHORTNESS OF BREATH: 0
CLAUDICATION: 0
EYES NEGATIVE: 1
MUSCULOSKELETAL NEGATIVE: 1
NERVOUS/ANXIOUS: 0
VOMITING: 0
PSYCHIATRIC NEGATIVE: 1
ABDOMINAL PAIN: 1
CONSTIPATION: 0
WEAKNESS: 1
RESPIRATORY NEGATIVE: 1
BACK PAIN: 0
CARDIOVASCULAR NEGATIVE: 1
FEVER: 0

## 2018-07-15 ASSESSMENT — PAIN SCALES - GENERAL
PAINLEVEL_OUTOF10: 6
PAINLEVEL_OUTOF10: 7
PAINLEVEL_OUTOF10: 6
PAINLEVEL_OUTOF10: 8

## 2018-07-15 NOTE — CARE PLAN
Problem: Pain Management  Goal: Pain level will decrease to patient's comfort goal  Outcome: PROGRESSING AS EXPECTED  Patient resting comfortably in bed at this time.  No complaints of pain.      Problem: Mobility  Goal: Risk for activity intolerance will decrease  Outcome: PROGRESSING AS EXPECTED  Patient educated on the importance of mobilizing.  Patient refusing to get out of bed at this time.

## 2018-07-15 NOTE — PROGRESS NOTES
Pt aox4. No visible signs of distress. VSS.  Tolerating medication regimen without any signs or symptoms of adverse reactions.  Pt declined pain most of shift. This AM pt reports 8/10 abdominal pain, medicate per MAR.  No complaints of SOB.  Tolerating diet without n/v. Declines any snacks offered.  Urostomy to down drain. Patent.  Abdominal midline incision CHALO.  Bed locked and in low position.  Call light within reach and able to make all needs be known.  Will continue to monitor.

## 2018-07-15 NOTE — PROGRESS NOTES
Pharmacy TPN Note    7/15/2018    Magnesium 4 gm IV supplemented this AM per TPN protocol. Per discussion with MD, patient is tolerating oral intake and TPN can be discontinued.    Patient was on cyclic TPN and formulation was stopped earlier this AM, BG stable following discontinuation of parenteral nutrition. TPN protocol DC'd (including labs), repeat labs ordered for AM for MD review.    Mckenna Sutton, JorgeD

## 2018-07-15 NOTE — PROGRESS NOTES
[facility] Hospitalist Progress Note    Date of Service: 7/15/2018    Chief Complaint  83 y.o. male admitted 2018 with abdominal pain.    Interval Problem Update  Postoperative, exploratory laparotomy.  Patient at this point is doing well pain is down to 2 out of 10.  He has had several bowel movements through the night and bowel sounds at this point are adequate.  Patient will be able to discharge home once he is eating at least 50% of his meals, currently remains on TPN which we will wean off        7/15 no new events, resting, arousable      Consultants/Specialty  Surgery    Disposition  To home in 1-2 days with clinical improvement with home health and TPN  Accepted by home health, tpn pending  Out of bed 3 times a day  Encourage activity  FWW        Review of Systems   Constitutional: Negative for chills, fever and malaise/fatigue.   Eyes: Negative.    Respiratory: Negative.  Negative for shortness of breath.    Cardiovascular: Negative.  Negative for claudication.   Gastrointestinal: Positive for abdominal pain (2/10). Negative for constipation, heartburn and vomiting.        Passing gas, pain 4/10 but off the pain medications   Genitourinary:        Urostomy   Musculoskeletal: Negative.  Negative for back pain.   Skin: Negative.    Neurological: Positive for weakness. Negative for dizziness.   Endo/Heme/Allergies: Negative.    Psychiatric/Behavioral: Negative.  The patient is not nervous/anxious.    All other systems reviewed and are negative.     Physical Exam  Laboratory/Imaging   Hemodynamics  Temp (24hrs), Av.8 °C (98.2 °F), Min:36.2 °C (97.2 °F), Max:37.7 °C (99.8 °F)   Temperature: 36.5 °C (97.7 °F)  Pulse  Av.8  Min: 41  Max: 125    Blood Pressure : 129/67      Respiratory      Respiration: 18, Pulse Oximetry: 95 %     Work Of Breathing / Effort: Mild  RUL Breath Sounds: Clear, RML Breath Sounds: Diminished, RLL Breath Sounds: Diminished, LEXA Breath Sounds: Clear, LLL Breath Sounds:  Diminished    Fluids    Intake/Output Summary (Last 24 hours) at 07/15/18 1335  Last data filed at 07/15/18 1058   Gross per 24 hour   Intake             2965 ml   Output             3425 ml   Net             -460 ml       Nutrition  Orders Placed This Encounter   Procedures   • Diet Order Regular     Standing Status:   Standing     Number of Occurrences:   1     Order Specific Question:   Diet:     Answer:   Regular [1]     Physical Exam   Constitutional: He is oriented to person, place, and time. No distress.   HENT:   Head: Normocephalic and atraumatic.   Eyes: EOM are normal.   Cardiovascular: Normal rate, regular rhythm and intact distal pulses.    Pulmonary/Chest: Effort normal and breath sounds normal. No respiratory distress. He has no rales.   Abdominal: Soft. Bowel sounds are normal. He exhibits no distension. There is no tenderness (2/10).       Midline wound with improving surrounding erythema, improved   Musculoskeletal: Normal range of motion. He exhibits no edema or tenderness.   Neurological: He is alert and oriented to person, place, and time. He has normal reflexes. No cranial nerve deficit. Coordination normal.   Skin: Skin is warm and dry.   Psychiatric: He has a normal mood and affect. His behavior is normal. Judgment and thought content normal.   Nursing note and vitals reviewed.      Recent Labs      07/13/18   0839  07/14/18   0350  07/15/18   0408   WBC  13.3*  10.9*  10.9*   RBC  2.83*  2.49*  2.50*   HEMOGLOBIN  8.4*  7.6*  7.5*   HEMATOCRIT  25.9*  23.0*  23.0*   MCV  91.5  92.4  92.0   MCH  29.7  30.5  30.0   MCHC  32.4*  33.0*  32.6*   RDW  55.6*  55.4*  53.8*   PLATELETCT  407  370  375   MPV  8.9*  9.2  9.3     Recent Labs      07/14/18   0350   SODIUM  134*   POTASSIUM  4.5   CHLORIDE  100   CO2  27   GLUCOSE  151*   BUN  20   CREATININE  0.63   CALCIUM  7.4*                      Assessment/Plan     * SBO (small bowel obstruction) ([facility])- (present on admission)   Assessment & Plan     Postoperative day #14 after exploratory laparotomy.    7/15 will d/w surgery, ? Dc TPN, tolerating diet, ?dc home w/o TPN support in am    7/14   At this point the patient remains on TPN for nutritional support , apparently he does not like the food offered by the facility, but was able to tolerate [employer] per RN  Cont cyclical TPN      7/13 cellulitis at surgical wound site  erythema at site improved  Discharge resolved  On antibiotics  + Bm 7/12              Hypokalemia   Assessment & Plan    Resolved        Normocytic anemia- (present on admission)   Assessment & Plan    h/h stable  s/p  1 u prbc   Iron <10,   On oral supplements  monitor        Renal insufficiency   Assessment & Plan    Resolved        Severe protein-calorie malnutrition (HCC)- (present on admission)   Assessment & Plan    For right now only eating about 10% of meals continue TPN        Hypomagnesemia   Assessment & Plan    oral supplements, cont        Leukocytosis   Assessment & Plan    Secondary to surgical wound cellulitis  Resolved  On abx  - CXR- bilateral atelectasis          T2DM (type 2 diabetes mellitus) (HCC)- (present on admission)   Assessment & Plan    At this point insulin is in the TPN and we are continuing at this point to monitor blood sugars  Blood sugar controlled  discontinue Accu-Chek        HLD (hyperlipidemia)- (present on admission)   Assessment & Plan    At this point patient will be able to restart his statins as long as liver functions are normal  Lab Results   Component Value Date/Time    CHOLSTRLTOT 78 (L) 07/02/2018 03:35 AM    TRIGLYCERIDE 121 07/02/2018 03:35 AM              HTN (hypertension)- (present on admission)   Assessment & Plan    Continue with medical optimization  Controlled, on bb          Quality-Core Measures   Reviewed items::  EKG reviewed, Labs reviewed, Medications reviewed and Radiology images reviewed  Carter catheter::  Neurogenic Bladder  DVT prophylaxis pharmacological::  Enoxaparin  (Lovenox)  Ulcer Prophylaxis::  Not indicated  Assessed for rehabilitation services:  Patient returned to prior level of function, rehabilitation not indicated at this time

## 2018-07-15 NOTE — PROGRESS NOTES
Received report from evening RN.  Assumed care of patient.  Patient A&Ox4.  C/O pain 7/10, declines medication at this time.  No nausea or vomiting.  RLQ urostomy with yellow urine.  Midline abdominal incision CHALO, some erythema and a small amount of purulent drainage.  ONEAL< strength 4/5.  No complaints of numbness/tingling.  RUE PICC line in place.  Educated on importance of mobilizing, oral care and use of IS.  Hypoactive bowel sounds.  NO flatus, NO BM.  Plan of care discussed.  Call light within reach.  Bed in lowest position.

## 2018-07-15 NOTE — DISCHARGE PLANNING
Anticipated Discharge Disposition: Home with TPN    Action: This RN CM faxed to TPN Rx to [facility].    Barriers to Discharge: TPN    Plan: Pending Insurance Auth for home TPN

## 2018-07-16 LAB
ALBUMIN SERPL BCP-MCNC: 1.9 G/DL (ref 3.2–4.9)
ALBUMIN/GLOB SERPL: 0.6 G/DL
ALP SERPL-CCNC: 102 U/L (ref 30–99)
ALT SERPL-CCNC: 35 U/L (ref 2–50)
ANION GAP SERPL CALC-SCNC: 5 MMOL/L (ref 0–11.9)
AST SERPL-CCNC: 21 U/L (ref 12–45)
BILIRUB SERPL-MCNC: 1.3 MG/DL (ref 0.1–1.5)
BUN SERPL-MCNC: 16 MG/DL (ref 8–22)
CALCIUM SERPL-MCNC: 7.7 MG/DL (ref 8.5–10.5)
CHLORIDE SERPL-SCNC: 100 MMOL/L (ref 96–112)
CO2 SERPL-SCNC: 29 MMOL/L (ref 20–33)
CREAT SERPL-MCNC: 0.72 MG/DL (ref 0.5–1.4)
ERYTHROCYTE [DISTWIDTH] IN BLOOD BY AUTOMATED COUNT: 52.4 FL (ref 35.9–50)
GLOBULIN SER CALC-MCNC: 3.3 G/DL (ref 1.9–3.5)
GLUCOSE SERPL-MCNC: 86 MG/DL (ref 65–99)
HCT VFR BLD AUTO: 23.2 % (ref 42–52)
HGB BLD-MCNC: 7.5 G/DL (ref 14–18)
MAGNESIUM SERPL-MCNC: 1.8 MG/DL (ref 1.5–2.5)
MCH RBC QN AUTO: 29.6 PG (ref 27–33)
MCHC RBC AUTO-ENTMCNC: 32.3 G/DL (ref 33.7–35.3)
MCV RBC AUTO: 91.7 FL (ref 81.4–97.8)
PHOSPHATE SERPL-MCNC: 3.3 MG/DL (ref 2.5–4.5)
PLATELET # BLD AUTO: 378 K/UL (ref 164–446)
PMV BLD AUTO: 9.2 FL (ref 9–12.9)
POTASSIUM SERPL-SCNC: 3.8 MMOL/L (ref 3.6–5.5)
PROT SERPL-MCNC: 5.2 G/DL (ref 6–8.2)
RBC # BLD AUTO: 2.53 M/UL (ref 4.7–6.1)
SODIUM SERPL-SCNC: 134 MMOL/L (ref 135–145)
WBC # BLD AUTO: 11.7 K/UL (ref 4.8–10.8)

## 2018-07-16 PROCEDURE — 85027 COMPLETE CBC AUTOMATED: CPT

## 2018-07-16 PROCEDURE — 700111 HCHG RX REV CODE 636 W/ 250 OVERRIDE (IP): Performed by: SURGERY

## 2018-07-16 PROCEDURE — 80053 COMPREHEN METABOLIC PANEL: CPT

## 2018-07-16 PROCEDURE — 770001 HCHG ROOM/CARE - MED/SURG/GYN PRIV*

## 2018-07-16 PROCEDURE — 700112 HCHG RX REV CODE 229: Performed by: SURGERY

## 2018-07-16 PROCEDURE — A9270 NON-COVERED ITEM OR SERVICE: HCPCS | Performed by: SURGERY

## 2018-07-16 PROCEDURE — 700105 HCHG RX REV CODE 258: Performed by: HOSPITALIST

## 2018-07-16 PROCEDURE — 83735 ASSAY OF MAGNESIUM: CPT

## 2018-07-16 PROCEDURE — 84100 ASSAY OF PHOSPHORUS: CPT

## 2018-07-16 PROCEDURE — 99232 SBSQ HOSP IP/OBS MODERATE 35: CPT | Performed by: INTERNAL MEDICINE

## 2018-07-16 PROCEDURE — 302103 DOWN DRAIN ADAPTER: Performed by: INTERNAL MEDICINE

## 2018-07-16 PROCEDURE — A9270 NON-COVERED ITEM OR SERVICE: HCPCS | Performed by: INTERNAL MEDICINE

## 2018-07-16 PROCEDURE — 700102 HCHG RX REV CODE 250 W/ 637 OVERRIDE(OP): Performed by: SURGERY

## 2018-07-16 PROCEDURE — 700102 HCHG RX REV CODE 250 W/ 637 OVERRIDE(OP): Performed by: INTERNAL MEDICINE

## 2018-07-16 RX ORDER — CLINDAMYCIN HYDROCHLORIDE 150 MG/1
300 CAPSULE ORAL 4 TIMES DAILY
Status: DISCONTINUED | OUTPATIENT
Start: 2018-07-16 | End: 2018-07-17

## 2018-07-16 RX ADMIN — Medication 325 MG: at 18:29

## 2018-07-16 RX ADMIN — CLINDAMYCIN HYDROCHLORIDE 300 MG: 150 CAPSULE ORAL at 14:47

## 2018-07-16 RX ADMIN — SODIUM CHLORIDE: 9 INJECTION, SOLUTION INTRAVENOUS at 19:17

## 2018-07-16 RX ADMIN — METOPROLOL TARTRATE 25 MG: 25 TABLET, FILM COATED ORAL at 05:45

## 2018-07-16 RX ADMIN — CLINDAMYCIN HYDROCHLORIDE 300 MG: 150 CAPSULE ORAL at 18:29

## 2018-07-16 RX ADMIN — ENOXAPARIN SODIUM 40 MG: 100 INJECTION SUBCUTANEOUS at 05:45

## 2018-07-16 RX ADMIN — Medication 325 MG: at 12:19

## 2018-07-16 RX ADMIN — CEFDINIR 300 MG: 300 CAPSULE ORAL at 18:29

## 2018-07-16 RX ADMIN — CLINDAMYCIN HYDROCHLORIDE 300 MG: 150 CAPSULE ORAL at 21:04

## 2018-07-16 RX ADMIN — MAGNESIUM GLUCONATE 500 MG ORAL TABLET 400 MG: 500 TABLET ORAL at 05:45

## 2018-07-16 RX ADMIN — METOPROLOL TARTRATE 25 MG: 25 TABLET, FILM COATED ORAL at 18:29

## 2018-07-16 RX ADMIN — Medication 325 MG: at 08:33

## 2018-07-16 RX ADMIN — DOCUSATE SODIUM 100 MG: 100 CAPSULE, LIQUID FILLED ORAL at 05:45

## 2018-07-16 RX ADMIN — CEFDINIR 300 MG: 300 CAPSULE ORAL at 05:45

## 2018-07-16 ASSESSMENT — ENCOUNTER SYMPTOMS
MYALGIAS: 0
VOMITING: 0
CARDIOVASCULAR NEGATIVE: 1
ABDOMINAL PAIN: 1
WEAKNESS: 1
CHILLS: 0
RESPIRATORY NEGATIVE: 1
NERVOUS/ANXIOUS: 0
NAUSEA: 0
DIZZINESS: 0
DEPRESSION: 0
FEVER: 0
EYES NEGATIVE: 1
MUSCULOSKELETAL NEGATIVE: 1
BACK PAIN: 0
SENSORY CHANGE: 0
PALPITATIONS: 0
PSYCHIATRIC NEGATIVE: 1
CLAUDICATION: 0
COUGH: 0

## 2018-07-16 ASSESSMENT — PAIN SCALES - GENERAL
PAINLEVEL_OUTOF10: 6
PAINLEVEL_OUTOF10: 5

## 2018-07-16 NOTE — PROGRESS NOTES
"Surgical Progress Note:      No acute events    PE:  /73   Pulse 82   Temp 36.9 °C (98.5 °F)   Resp (!) 26   Ht 1.854 m (6' 1\")   Wt 77 kg (169 lb 12.1 oz)   SpO2 94%   BMI 22.40 kg/m²     I/O:   Intake/Output Summary (Last 24 hours) at 07/16/18 1218  Last data filed at 07/16/18 0713   Gross per 24 hour   Intake             1680 ml   Output             1700 ml   Net              -20 ml     UOP:  PO:  IV:    GEN: NAD  COR: RRR  PULM: CTA  ABD: soft, NTND, incision intact except for central protion with mucopurulent exudate, erythema nearly resolved    Labs:  Recent Labs      07/14/18   0350  07/15/18   0408  07/16/18   0257   WBC  10.9*  10.9*  11.7*   RBC  2.49*  2.50*  2.53*   HEMOGLOBIN  7.6*  7.5*  7.5*   HEMATOCRIT  23.0*  23.0*  23.2*   MCV  92.4  92.0  91.7   MCH  30.5  30.0  29.6   RDW  55.4*  53.8*  52.4*   PLATELETCT  370  375  378   MPV  9.2  9.3  9.2   NEUTSPOLYS  77.80*   --    --    LYMPHOCYTES  6.40*   --    --    MONOCYTES  12.50   --    --    EOSINOPHILS  1.40   --    --    BASOPHILS  0.30   --    --      Recent Labs      07/14/18   0350  07/16/18   0257   SODIUM  134*  134*   POTASSIUM  4.5  3.8   CHLORIDE  100  100   CO2  27  29   GLUCOSE  151*  86   BUN  20  16         A/P:   S/P ex lap bowel resection  Wound infection, dc staples, may need wet to dry if opens up more  No apparent tunneling       Emanuel Bobby M.D.  [facility]  [phone]    "

## 2018-07-16 NOTE — PROGRESS NOTES
[facility] Hospitalist Progress Note    Date of Service: 2018    Chief Complaint  83 y.o. male admitted 2018 with abdominal pain.    Interval Problem Update  Postoperative, exploratory laparotomy.  Patient at this point is doing well pain is down to 2 out of 10.  He has had several bowel movements through the night and bowel sounds at this point are adequate.  Patient will be able to discharge home once he is eating at least 50% of his meals, currently remains on TPN which we will wean off         improved oral intake today  No new complaints  Family visiting, plan of care discussed    ambulating with 1 person assist      Consultants/Specialty  Surgery    Disposition  To home in 1-2 days with clinical improvement with home health +/- TPN  Accepted by home health, tpn pending  Out of bed 3 times a day  Encourage activity  FWW        Review of Systems   Constitutional: Negative for chills and fever.   HENT: Positive for hearing loss.    Eyes: Negative.    Respiratory: Negative.  Negative for cough.    Cardiovascular: Negative.  Negative for palpitations and claudication.   Gastrointestinal: Positive for abdominal pain (2/10). Negative for nausea and vomiting.        Passing gas, pain 4/10 but off the pain medications   Genitourinary: Negative for dysuria and urgency.        Urostomy   Musculoskeletal: Negative.  Negative for back pain and myalgias.   Skin: Negative.    Neurological: Positive for weakness. Negative for dizziness and sensory change.   Endo/Heme/Allergies: Negative.    Psychiatric/Behavioral: Negative.  Negative for depression. The patient is not nervous/anxious.    All other systems reviewed and are negative.     Physical Exam  Laboratory/Imaging   Hemodynamics  Temp (24hrs), Av.1 °C (98.7 °F), Min:36.5 °C (97.7 °F), Max:37.4 °C (99.3 °F)   Temperature: 36.9 °C (98.5 °F)  Pulse  Av.7  Min: 41  Max: 125    Blood Pressure : 135/73      Respiratory      Respiration: (!) 26, Pulse  Oximetry: 94 %     Work Of Breathing / Effort: Mild  RUL Breath Sounds: Clear, RML Breath Sounds: Diminished, RLL Breath Sounds: Diminished, LEXA Breath Sounds: Clear, LLL Breath Sounds: Diminished    Fluids    Intake/Output Summary (Last 24 hours) at 07/16/18 1339  Last data filed at 07/16/18 1200   Gross per 24 hour   Intake             2000 ml   Output             1700 ml   Net              300 ml       Nutrition  Orders Placed This Encounter   Procedures   • Diet Order Regular     Standing Status:   Standing     Number of Occurrences:   1     Order Specific Question:   Diet:     Answer:   Regular [1]     Physical Exam   Constitutional: He is oriented to person, place, and time. No distress.   HENT:   Head: Normocephalic and atraumatic.   Eyes: EOM are normal.   Cardiovascular: Normal rate, regular rhythm and intact distal pulses.    Pulmonary/Chest: Effort normal and breath sounds normal. No respiratory distress. He has no rales.   Abdominal: Soft. Bowel sounds are normal. He exhibits no distension. Tenderness: 2/10.       Midline wound with improving surrounding erythema, improved  There is a 1 cm opening in incisional wound with drainage   Musculoskeletal: Normal range of motion. He exhibits no edema or tenderness.   Neurological: He is alert and oriented to person, place, and time. He has normal reflexes. No cranial nerve deficit. He exhibits normal muscle tone. Coordination normal.   Skin: Skin is warm and dry. No rash noted. He is not diaphoretic. There is erythema.   Psychiatric: He has a normal mood and affect. His behavior is normal. Judgment and thought content normal.   Nursing note and vitals reviewed.      Recent Labs      07/14/18   0350  07/15/18   0408  07/16/18   0257   WBC  10.9*  10.9*  11.7*   RBC  2.49*  2.50*  2.53*   HEMOGLOBIN  7.6*  7.5*  7.5*   HEMATOCRIT  23.0*  23.0*  23.2*   MCV  92.4  92.0  91.7   MCH  30.5  30.0  29.6   MCHC  33.0*  32.6*  32.3*   RDW  55.4*  53.8*  52.4*    PLATELETCT  370  375  378   MPV  9.2  9.3  9.2     Recent Labs      07/14/18   0350  07/16/18   0257   SODIUM  134*  134*   POTASSIUM  4.5  3.8   CHLORIDE  100  100   CO2  27  29   GLUCOSE  151*  86   BUN  20  16   CREATININE  0.63  0.72   CALCIUM  7.4*  7.7*                      Assessment/Plan     * SBO (small bowel obstruction) (HCC)- (present on admission)   Assessment & Plan    Postoperative day #15 after exploratory laparotomy.    7/16 TPN dc on 7/15 to improve appetite, encourage activity, pt states he will be able to eat enough for nutritional needs once he is discharged to home, however, he is non-compliant with demonstrating this, plan to f/u with surgery, may need dc to home with TPN ( hold for now), SW assisting with dc plans, encourage activity, oob tid, not cleared for dc home today                Hypokalemia   Assessment & Plan    Resolved        Normocytic anemia- (present on admission)   Assessment & Plan    h/h stable  s/p  1 u prbc   Iron <10,   On oral supplements  monitor        Renal insufficiency   Assessment & Plan    Resolved        Severe protein-calorie malnutrition (HCC)- (present on admission)   Assessment & Plan    For right now only eating about 10% of meals continue TPN        Hypomagnesemia   Assessment & Plan    oral supplements, cont        Leukocytosis   Assessment & Plan    Secondary to surgical wound cellulitis  Ongoing 10 to 11, cont abx  Wound culture positive for MSSA and E. coli  Add clindamycin  Continue Omnicef  Monitor  Discussed with surgery, Dr. Bobby to evaluate open wound    - CXR- bilateral atelectasis          T2DM (type 2 diabetes mellitus) (HCC)- (present on admission)   Assessment & Plan    At this point insulin is in the TPN and we are continuing at this point to monitor blood sugars  Blood sugar controlled  discontinue Accu-Chek        HLD (hyperlipidemia)- (present on admission)   Assessment & Plan    At this point patient will be able to restart his  statins as long as liver functions are normal  Lab Results   Component Value Date/Time    CHOLSTRLTOT 78 (L) 07/02/2018 03:35 AM    TRIGLYCERIDE 121 07/02/2018 03:35 AM              HTN (hypertension)- (present on admission)   Assessment & Plan    Continue with medical optimization  Controlled, on bb          Quality-Core Measures   Reviewed items::  EKG reviewed, Labs reviewed, Medications reviewed and Radiology images reviewed  Carter catheter::  Neurogenic Bladder  DVT prophylaxis pharmacological::  Enoxaparin (Lovenox)  Ulcer Prophylaxis::  Not indicated  Assessed for rehabilitation services:  Patient returned to prior level of function, rehabilitation not indicated at this time

## 2018-07-16 NOTE — CARE PLAN
Problem: Bowel/Gastric:  Goal: Normal bowel function is maintained or improved  Outcome: PROGRESSING AS EXPECTED  + flatus, + BM.     Problem: Pain Management  Goal: Pain level will decrease to patient's comfort goal  Outcome: PROGRESSING AS EXPECTED  Patient denying any pain at this time.

## 2018-07-16 NOTE — PROGRESS NOTES
"Pt aox4. No visible signs of distress. VSS.  Tolerating medication regimen without any signs or symptoms of adverse reactions.  No complaints of pain this shift.  Tolerating diet without n/v. Continues to have poor appetite, stating \"I don't like any of that stuff. Take it away.\"  Labs drawn from PICC.  Urostomy patent and draining. Dressing is CDI.  +BS, +flatus, -BM.  Non-compliant with attempts to mobilize patient.  Midline incision is warm to touch, CHALO with staples.  Bed locked and in low position.  Call light within reach and able to make all needs be known.  Will continue to monitor.  "

## 2018-07-16 NOTE — PROGRESS NOTES
Bedside report completed.  A&O x4.  In no acute distress.   VSS.  SpO2 94% on 2L NC.  Moist, productive cough.    RUE PICC tko, no s/s infection  Up to chair x 1 assist, unsteady gait.   Tolerating regular diet, denies N/V, but has had poor PO intake of food the last 24 hours.  Encouraged PO intake of nutrition, patient verbalizes understanding.    Denies pain.    Midline abdominal incision with staples, slight redness around incision, small portion above umbilicus not approximated and oozing yellow purulent drainage.    Last BM 7/12/18, + flatus.   RUQ ostomy, appliance changed.    Call light and personal belongings within reach.  SCDs refused despite educaiton.     POC discussed and all questions answered.  Hourly rounding and fall precautions in place.  No additional needs at this time.

## 2018-07-16 NOTE — DIETARY
"Nutrition Services: Update     Pt is on day 20 of admit.  Florian Trevizo is a 83 y.o. male with admitting DX of SBO.      Assessment:  Weight 77 kg via bed scale on 7/15   Body mass index is 22.4 kg/m². - WNL   Diet: Regular - per ADLs, pt with variable PO intake  Supplements: Boost Glucose Control TID with meals     Evaluation:   1. Pt was receiving TPN d/t poor po intake and unable to obtain enteral access   2. TPN was d/c'd yesterday (7/15) despite suboptimal po intake of meals   3. Spoke w/pt at bedside, he appears very thin and frail. Pt had has his lunch tray on his bedside table which was untouched.  Pt states he is not eating because he is \"not hungry.\"  He also had Premier Protein shakes that he reports consuming 3 per day.  He reports the boost he is receiving is \"alright.\"   4. Pt does not wish to male an changes with his meals at this time   5. Per discussion w/MD, pt apparently consumed 100% of a meal that was brought in by family   6. Pertinent meds and labs reviewed   7. Pt noted with wt gain since admit; likely r/t fluids v scale error as pt appears very thin and frail   8. Surgical incision to abdomen; no skin breakdown noted at this time   9. Last BM 7/16    Recommendations/Plan:  · Encourage PO intake of meals, snacks and supplements   · Obtain measured weights at least weekly to help monitor fluid and nutritional status   · Monitor wt and lab trends   · Nutrition rep to see pt daily for meal preferences     RD following               "

## 2018-07-16 NOTE — CARE PLAN
Problem: Nutritional:  Goal: Achieve adequate nutritional intake  Patient will consume >50% of meals   Outcome: PROGRESSING SLOWER THAN EXPECTED

## 2018-07-17 LAB
ANION GAP SERPL CALC-SCNC: 7 MMOL/L (ref 0–11.9)
BUN SERPL-MCNC: 14 MG/DL (ref 8–22)
CALCIUM SERPL-MCNC: 7.9 MG/DL (ref 8.5–10.5)
CHLORIDE SERPL-SCNC: 100 MMOL/L (ref 96–112)
CO2 SERPL-SCNC: 29 MMOL/L (ref 20–33)
CREAT SERPL-MCNC: 0.7 MG/DL (ref 0.5–1.4)
ERYTHROCYTE [DISTWIDTH] IN BLOOD BY AUTOMATED COUNT: 53.1 FL (ref 35.9–50)
GLUCOSE SERPL-MCNC: 90 MG/DL (ref 65–99)
HCT VFR BLD AUTO: 24.2 % (ref 42–52)
HGB BLD-MCNC: 7.7 G/DL (ref 14–18)
MCH RBC QN AUTO: 29.3 PG (ref 27–33)
MCHC RBC AUTO-ENTMCNC: 31.8 G/DL (ref 33.7–35.3)
MCV RBC AUTO: 92 FL (ref 81.4–97.8)
PLATELET # BLD AUTO: 354 K/UL (ref 164–446)
PMV BLD AUTO: 9.1 FL (ref 9–12.9)
POTASSIUM SERPL-SCNC: 3.5 MMOL/L (ref 3.6–5.5)
RBC # BLD AUTO: 2.63 M/UL (ref 4.7–6.1)
SODIUM SERPL-SCNC: 136 MMOL/L (ref 135–145)
WBC # BLD AUTO: 11.4 K/UL (ref 4.8–10.8)

## 2018-07-17 PROCEDURE — 770001 HCHG ROOM/CARE - MED/SURG/GYN PRIV*

## 2018-07-17 PROCEDURE — 700102 HCHG RX REV CODE 250 W/ 637 OVERRIDE(OP): Performed by: HOSPITALIST

## 2018-07-17 PROCEDURE — 700102 HCHG RX REV CODE 250 W/ 637 OVERRIDE(OP): Performed by: INTERNAL MEDICINE

## 2018-07-17 PROCEDURE — 85027 COMPLETE CBC AUTOMATED: CPT

## 2018-07-17 PROCEDURE — 80048 BASIC METABOLIC PNL TOTAL CA: CPT

## 2018-07-17 PROCEDURE — 99232 SBSQ HOSP IP/OBS MODERATE 35: CPT | Performed by: INTERNAL MEDICINE

## 2018-07-17 PROCEDURE — A9270 NON-COVERED ITEM OR SERVICE: HCPCS | Performed by: INTERNAL MEDICINE

## 2018-07-17 PROCEDURE — A9270 NON-COVERED ITEM OR SERVICE: HCPCS | Performed by: HOSPITALIST

## 2018-07-17 PROCEDURE — 700111 HCHG RX REV CODE 636 W/ 250 OVERRIDE (IP): Performed by: SURGERY

## 2018-07-17 RX ORDER — SULFAMETHOXAZOLE AND TRIMETHOPRIM 800; 160 MG/1; MG/1
1 TABLET ORAL EVERY 12 HOURS
Status: DISCONTINUED | OUTPATIENT
Start: 2018-07-17 | End: 2018-07-18 | Stop reason: HOSPADM

## 2018-07-17 RX ORDER — SULFAMETHOXAZOLE AND TRIMETHOPRIM 800; 160 MG/1; MG/1
TABLET ORAL
Status: COMPLETED
Start: 2018-07-17 | End: 2018-07-17

## 2018-07-17 RX ORDER — ACETAMINOPHEN 325 MG/1
650 TABLET ORAL ONCE
Status: COMPLETED | OUTPATIENT
Start: 2018-07-17 | End: 2018-07-17

## 2018-07-17 RX ORDER — FERROUS SULFATE 325(65) MG
TABLET ORAL
Status: COMPLETED
Start: 2018-07-17 | End: 2018-07-17

## 2018-07-17 RX ADMIN — METOPROLOL TARTRATE 25 MG: 25 TABLET, FILM COATED ORAL at 17:39

## 2018-07-17 RX ADMIN — SULFAMETHOXAZOLE AND TRIMETHOPRIM 1 TABLET: 800; 160 TABLET ORAL at 08:08

## 2018-07-17 RX ADMIN — METOPROLOL TARTRATE 25 MG: 25 TABLET, FILM COATED ORAL at 08:08

## 2018-07-17 RX ADMIN — SULFAMETHOXAZOLE AND TRIMETHOPRIM 1 TABLET: 800; 160 TABLET ORAL at 17:38

## 2018-07-17 RX ADMIN — Medication 325 MG: at 11:37

## 2018-07-17 RX ADMIN — ACETAMINOPHEN 650 MG: 325 TABLET, FILM COATED ORAL at 20:24

## 2018-07-17 RX ADMIN — Medication 325 MG: at 17:39

## 2018-07-17 RX ADMIN — ENOXAPARIN SODIUM 40 MG: 100 INJECTION SUBCUTANEOUS at 08:08

## 2018-07-17 RX ADMIN — MAGNESIUM GLUCONATE 500 MG ORAL TABLET 400 MG: 500 TABLET ORAL at 08:08

## 2018-07-17 RX ADMIN — Medication 325 MG: at 08:08

## 2018-07-17 ASSESSMENT — ENCOUNTER SYMPTOMS
DIZZINESS: 0
MYALGIAS: 0
VOMITING: 0
EYES NEGATIVE: 1
DEPRESSION: 0
WEAKNESS: 1
COUGH: 0
CHILLS: 0
ABDOMINAL PAIN: 1
FEVER: 0
NAUSEA: 0
BACK PAIN: 0

## 2018-07-17 ASSESSMENT — PAIN SCALES - GENERAL: PAINLEVEL_OUTOF10: 3

## 2018-07-17 NOTE — PROGRESS NOTES
"Patient up to bathroom, RN attempted to ambulate patient around unit.  Patient refusing, states \"come back in an hour.\"  Educated on importance, continues to refuse.    "

## 2018-07-17 NOTE — PROGRESS NOTES
Assumed care of Mr Trevizo at 1900    Pt is A&O 4  Pain 5/10.  Pt described as tolerable  Pt denies nausea  Tolerating Diet  Positive Urine output through RUQ nephrostomy   Positive Flatus  Positive BM Void  Midline abdominal incision. Open to air, approximated with the exception of approximately 1 cm near middle.  Small dehiscence, scant amount of purulent drainage, open to air.  1 assist   SCD's Refused  Bed in lowest position and locked.    ** Bed alarm not indicated per Odette Jones assessment.  CLIP, hourly rounding in place    Reviewed plan of care with patient, bed in lowest position and locked, pt resting comfortably now, call light within reach, all needs met at this time

## 2018-07-17 NOTE — PROGRESS NOTES
[facility]ist Progress Note    Date of Service: 2018    Chief Complaint  83 y.o. male admitted 2018 with abdominal pain.    Interval Problem Update  SBO-doing better. Good flatus and BM's, little abdominal pain. TPN been off since 7/15, oral intake remains somewhat low. Does not like the food here. Almost no secretions from his abdominal wound. Afebrile.       Consultants/Specialty  Surgery    Disposition  Clinically stable now  Accepted by home health, unsure if he needs TPN at home or not  Out of bed 3 times a day  Encourage activity  FWW        Review of Systems   Constitutional: Negative for chills and fever.   HENT: Positive for hearing loss.    Eyes: Negative.    Respiratory: Negative for cough.    Cardiovascular: Negative for chest pain.   Gastrointestinal: Positive for abdominal pain (minimal). Negative for nausea and vomiting.        Passing gas, pain 4/10 but off the pain medications   Genitourinary: Negative for dysuria.        Urostomy   Musculoskeletal: Negative for back pain and myalgias.   Skin: Negative.    Neurological: Positive for weakness (slowly improving). Negative for dizziness.   Endo/Heme/Allergies: Negative.    Psychiatric/Behavioral: Negative for depression.   All other systems reviewed and are negative.     Physical Exam  Laboratory/Imaging   Hemodynamics  Temp (24hrs), Av.7 °C (98 °F), Min:36.3 °C (97.4 °F), Max:36.9 °C (98.4 °F)   Temperature: 36.3 °C (97.4 °F)  Pulse  Av.8  Min: 41  Max: 125    Blood Pressure : 131/71      Respiratory      Respiration: 18, Pulse Oximetry: 94 %     Work Of Breathing / Effort: Mild  RUL Breath Sounds: Clear, RML Breath Sounds: Diminished, RLL Breath Sounds: Diminished, LEXA Breath Sounds: Clear, LLL Breath Sounds: Diminished    Fluids    Intake/Output Summary (Last 24 hours) at 18 1259  Last data filed at 18 1136   Gross per 24 hour   Intake             1040 ml   Output             2175 ml   Net            -1135 ml        Nutrition  Orders Placed This Encounter   Procedures   • Diet Order Regular     Standing Status:   Standing     Number of Occurrences:   1     Order Specific Question:   Diet:     Answer:   Regular [1]     Physical Exam   Constitutional: He is oriented to person, place, and time. No distress.   HENT:   Head: Normocephalic and atraumatic.   Eyes: EOM are normal. Right eye exhibits no discharge. Left eye exhibits no discharge.   Cardiovascular: Normal rate, regular rhythm and intact distal pulses.    Pulmonary/Chest: Effort normal and breath sounds normal. No stridor. No respiratory distress. He has no rales.   Abdominal: Soft. Bowel sounds are normal. He exhibits no distension. There is no tenderness (2/10).       Midline wound, good granulation tissue, large receding erythema  There is a 0.5 cm opening in incisional wound with drainage. Minimal drainage appreciated today    Urostomy bag in place, site appears C/D/I, clear yellow urine present   Musculoskeletal: Normal range of motion. He exhibits no edema.   Triple lumen PICC in LUE, insertion site C/D/I   Neurological: He is alert and oriented to person, place, and time. He has normal reflexes. No cranial nerve deficit.   Skin: Skin is warm and dry. No rash noted. He is not diaphoretic. There is erythema (nearly resolved on the abdominal surface).   Psychiatric: He has a normal mood and affect.   Nursing note and vitals reviewed.      Recent Labs      07/15/18   0408  07/16/18   0257  07/17/18   0335   WBC  10.9*  11.7*  11.4*   RBC  2.50*  2.53*  2.63*   HEMOGLOBIN  7.5*  7.5*  7.7*   HEMATOCRIT  23.0*  23.2*  24.2*   MCV  92.0  91.7  92.0   MCH  30.0  29.6  29.3   MCHC  32.6*  32.3*  31.8*   RDW  53.8*  52.4*  53.1*   PLATELETCT  375  378  354   MPV  9.3  9.2  9.1     Recent Labs      07/16/18   0257  07/17/18   0335   SODIUM  134*  136   POTASSIUM  3.8  3.5*   CHLORIDE  100  100   CO2  29  29   GLUCOSE  86  90   BUN  16  14   CREATININE  0.72  0.70    CALCIUM  7.7*  7.9*                      Assessment/Plan     * SBO (small bowel obstruction) (HCC)- (present on admission)   Assessment & Plan    Postoperative day #16 after exploratory laparotomy.      -clinically has resolved, good flatus and BM's  -oral intake remains somewhat low  -no abdominal pain, no N/V  -Hold TPN, but may need to re-start if oral intake remains low  -will touch base with GS  -ambulate TID, patient has been refusing            Hypokalemia- (present on admission)   Assessment & Plan    Resolved        Normocytic anemia- (present on admission)   Assessment & Plan    -H/H remains stable, continue Fe supplementation        Renal insufficiency   Assessment & Plan    Resolved        Severe protein-calorie malnutrition (HCC)- (present on admission)   Assessment & Plan    -off TPN for 2 days, but oral intake remains somewhat low, primarily because patient does not like the food here  -encourage oral intake  -TPN for home, 50/50?  -need to touch base with surgery        Hypomagnesemia- (present on admission)   Assessment & Plan    oral supplements, cont        Leukocytosis- (present on admission)   Assessment & Plan    Secondary to surgical wound cellulitis  -resolving, afebrile  Wound culture positive for MSSA and E. coli  -change abx's to Bactrim, since it covers both above organisms  -secretion from open wound has decreased considerably  -GS following          T2DM (type 2 diabetes mellitus) (HCC)- (present on admission)   Assessment & Plan    -off TPN currently  -blood sugars remain quite well controlled, monitor closely  -ISS, adjust PRN        HLD (hyperlipidemia)- (present on admission)   Assessment & Plan    Continue statin           HTN (hypertension)- (present on admission)   Assessment & Plan    Continue with medical optimization  Controlled, on bb, no changes planned at this time          Quality-Core Measures   Reviewed items::  EKG reviewed, Labs reviewed, Medications reviewed and  Radiology images reviewed  Carter catheter::  Neurogenic Bladder  DVT prophylaxis pharmacological::  Enoxaparin (Lovenox)  Ulcer Prophylaxis::  Not indicated  Assessed for rehabilitation services:  Patient returned to prior level of function, rehabilitation not indicated at this time

## 2018-07-17 NOTE — THERAPY
OT tx attempted. Pt sleeping in recliner. Pt refusing participation. Pt states he has no concerns with self-care currently. Per RN, pt mobilizing minimally. Will attempt back 1x more as able, then will sign off if pt continues to refuse, as pt has frequently refused.

## 2018-07-17 NOTE — PROGRESS NOTES
Bedside report completed.  A&O x4.  In no acute distress.   VSS.  SpO2 94% on 2L NC.  RUE PICC tko, no s/s infection  Up to chair x 1 assist, steady gait.   Tolerating regular diet, denies N/V, but has poor PO intake of nutrition d/t specific food preferences.  Patient requesting fruit cup and corn flakes for breakfast, kitchen called.   Denies pain.    Midline abdominal incision approximated, small portion above umbilicus not approximated, less drainage and redness than yesterday.   Last BM 7/16/18. + flatus.   RUQ ostomy, adequate urine output.    Call light and personal belongings within reach.  SCDs refused despite educaiton.     POC discussed and all questions answered.  Hourly rounding and fall precautions in place.  No additional needs at this time.

## 2018-07-17 NOTE — CARE PLAN
Problem: Safety  Goal: Will remain free from falls  Outcome: PROGRESSING AS EXPECTED  Pt remains free from fall at this time.  Pt educated on fall risk.  Pt demonstrates understanding by appropriate use of call light to call for assistance.  CLIP, hourly rounding in place    Problem: Venous Thromboembolism (VTW)/Deep Vein Thrombosis (DVT) Prevention:  Goal: Patient will participate in Venous Thrombosis (VTE)/Deep Vein Thrombosis (DVT)Prevention Measures   07/16/18 2100   OTHER   Risk Assessment Score 3   VTE RISK High   Pharmacologic Prophylaxis Used LMWH: Enoxaparin(Lovenox)   Mechanical/VTE Prophylaxis   Mechanical Prophylaxis  SCDs, Sequential Compression Device   SCDs, Sequential Compression Device Refused

## 2018-07-18 ENCOUNTER — PATIENT OUTREACH (OUTPATIENT)
Dept: HEALTH INFORMATION MANAGEMENT | Facility: OTHER | Age: 83
End: 2018-07-18

## 2018-07-18 VITALS
OXYGEN SATURATION: 100 % | DIASTOLIC BLOOD PRESSURE: 73 MMHG | BODY MASS INDEX: 21.77 KG/M2 | WEIGHT: 164.24 LBS | SYSTOLIC BLOOD PRESSURE: 132 MMHG | TEMPERATURE: 98.8 F | HEIGHT: 73 IN | RESPIRATION RATE: 18 BRPM | HEART RATE: 90 BPM

## 2018-07-18 PROBLEM — K56.609 SBO (SMALL BOWEL OBSTRUCTION) (HCC): Status: RESOLVED | Noted: 2018-06-26 | Resolved: 2018-07-18

## 2018-07-18 PROBLEM — N28.9 RENAL INSUFFICIENCY: Status: RESOLVED | Noted: 2018-06-30 | Resolved: 2018-07-18

## 2018-07-18 PROBLEM — D72.829 LEUKOCYTOSIS: Status: RESOLVED | Noted: 2018-07-10 | Resolved: 2018-07-18

## 2018-07-18 PROBLEM — E83.42 HYPOMAGNESEMIA: Status: RESOLVED | Noted: 2018-07-12 | Resolved: 2018-07-18

## 2018-07-18 PROBLEM — E87.6 HYPOKALEMIA: Status: RESOLVED | Noted: 2018-07-04 | Resolved: 2018-07-18

## 2018-07-18 LAB
ANION GAP SERPL CALC-SCNC: 6 MMOL/L (ref 0–11.9)
BUN SERPL-MCNC: 15 MG/DL (ref 8–22)
CALCIUM SERPL-MCNC: 7.8 MG/DL (ref 8.5–10.5)
CHLORIDE SERPL-SCNC: 102 MMOL/L (ref 96–112)
CO2 SERPL-SCNC: 29 MMOL/L (ref 20–33)
CREAT SERPL-MCNC: 0.74 MG/DL (ref 0.5–1.4)
ERYTHROCYTE [DISTWIDTH] IN BLOOD BY AUTOMATED COUNT: 54.5 FL (ref 35.9–50)
GLUCOSE SERPL-MCNC: 107 MG/DL (ref 65–99)
HCT VFR BLD AUTO: 23.5 % (ref 42–52)
HGB BLD-MCNC: 7.6 G/DL (ref 14–18)
MAGNESIUM SERPL-MCNC: 1.5 MG/DL (ref 1.5–2.5)
MCH RBC QN AUTO: 29.8 PG (ref 27–33)
MCHC RBC AUTO-ENTMCNC: 32.3 G/DL (ref 33.7–35.3)
MCV RBC AUTO: 92.2 FL (ref 81.4–97.8)
PLATELET # BLD AUTO: 418 K/UL (ref 164–446)
PMV BLD AUTO: 9.4 FL (ref 9–12.9)
POTASSIUM SERPL-SCNC: 3.4 MMOL/L (ref 3.6–5.5)
RBC # BLD AUTO: 2.55 M/UL (ref 4.7–6.1)
SODIUM SERPL-SCNC: 137 MMOL/L (ref 135–145)
WBC # BLD AUTO: 10.8 K/UL (ref 4.8–10.8)

## 2018-07-18 PROCEDURE — 700102 HCHG RX REV CODE 250 W/ 637 OVERRIDE(OP): Performed by: INTERNAL MEDICINE

## 2018-07-18 PROCEDURE — 83735 ASSAY OF MAGNESIUM: CPT

## 2018-07-18 PROCEDURE — 85027 COMPLETE CBC AUTOMATED: CPT

## 2018-07-18 PROCEDURE — A9270 NON-COVERED ITEM OR SERVICE: HCPCS | Performed by: INTERNAL MEDICINE

## 2018-07-18 PROCEDURE — 80048 BASIC METABOLIC PNL TOTAL CA: CPT

## 2018-07-18 PROCEDURE — 700111 HCHG RX REV CODE 636 W/ 250 OVERRIDE (IP): Performed by: SURGERY

## 2018-07-18 PROCEDURE — 99239 HOSP IP/OBS DSCHRG MGMT >30: CPT | Performed by: INTERNAL MEDICINE

## 2018-07-18 RX ORDER — FERROUS SULFATE 325(65) MG
325 TABLET ORAL
Qty: 30 TAB | Refills: 1 | Status: SHIPPED | OUTPATIENT
Start: 2018-07-18 | End: 2018-12-27

## 2018-07-18 RX ORDER — SULFAMETHOXAZOLE AND TRIMETHOPRIM 800; 160 MG/1; MG/1
1 TABLET ORAL EVERY 12 HOURS
Qty: 16 TAB | Refills: 0 | Status: SHIPPED | OUTPATIENT
Start: 2018-07-18 | End: 2018-07-26

## 2018-07-18 RX ORDER — PSEUDOEPHEDRINE HCL 30 MG
100 TABLET ORAL 2 TIMES DAILY
Qty: 60 CAP | Refills: 0 | Status: SHIPPED | OUTPATIENT
Start: 2018-07-18 | End: 2018-12-27

## 2018-07-18 RX ADMIN — Medication 325 MG: at 12:53

## 2018-07-18 RX ADMIN — ENOXAPARIN SODIUM 40 MG: 100 INJECTION SUBCUTANEOUS at 06:49

## 2018-07-18 RX ADMIN — MAGNESIUM GLUCONATE 500 MG ORAL TABLET 400 MG: 500 TABLET ORAL at 06:49

## 2018-07-18 RX ADMIN — SULFAMETHOXAZOLE AND TRIMETHOPRIM 1 TABLET: 800; 160 TABLET ORAL at 06:50

## 2018-07-18 RX ADMIN — Medication 325 MG: at 08:09

## 2018-07-18 NOTE — CARE PLAN
Problem: Safety  Goal: Will remain free from injury  Outcome: PROGRESSING AS EXPECTED  Safety precautions in place. Bed in locked/low position. 2 side rails up. Treaded socks. Call light in reach, calls appropriately. Hourly rounding practiced.    Problem: Mobility  Goal: Risk for activity intolerance will decrease  Outcome: PROGRESSING AS EXPECTED  Pt is mobilizing with SBA and fww. Calls appropriately.

## 2018-07-18 NOTE — PROGRESS NOTES
Patient reporting headache. No available PRNs. Dr. Wang paged for Tylenol. Received one time order for 650 mg acetaminophen.

## 2018-07-18 NOTE — PROGRESS NOTES
Discharging Patient home per physician order.  Discharged with wife.    Demonstrated understanding of discharge instructions, follow up appointments, home medications, prescriptions, home care for surgical wound and nursing care instructions for exploratory laparotomy and small bowel obstruction.    Ambulating with no assistance, voiding without difficulty through RUQ urostomy, pain well controlled, tolerating oral medications, oxygen saturation greater than 90% on RA, tolerating diet.    Educational handouts given and discussed.    Verbalized understanding of discharge instructions and educational handouts.    All questions answered.  Belongings with patient at time of discharge.

## 2018-07-18 NOTE — PROGRESS NOTES
Bedside report completed.  A&O x4.  Patient anxious to go home.    VSS.  SpO2 93% on 2L NC.  RUE PICC tko, no s/s infection  Tolerating regular diet, denies N/V, but has poor PO intake of nutrition d/t specific food preferences.  Patient continues to snack on food wife brought from home, but does not eat much of food trays.   Midline abdominal incision approximated.  Last BM 7/17/18. + flatus.   RUQ ostomy, adequate urine output.    Call light and personal belongings within reach.  SCDs refused despite educaiton.     POC discussed and all questions answered.  Hourly rounding and fall precautions in place.  No additional needs at this time.

## 2018-07-18 NOTE — DISCHARGE SUMMARY
Discharge Summary    CHIEF COMPLAINT ON ADMISSION  Chief Complaint   Patient presents with   • Abdominal Pain       Reason for Admission  EMS 11     Admission Date  6/26/2018    CODE STATUS  Full Code    HPI & HOSPITAL COURSE  This is a 83 y.o. male here with above issue. He was found to have a small bowel obstruction on admission CT scan. He was admitted to the general surgical floor and General surgery was consulted. Patient underwent exploratory laparotomy along with lysis of adhesions and small bowel resection. Pathology showed no malignancy and viable small bowel. Patient tolerated the procedure somewhat well, but post operatively suffered from intermittent ileus and poor PO intake. He had a triple lumen PICC line placed and received TPN for several days until his PO intake improved. Additionally, he developed a small opening along the anterior abdominal wall incision that grew MSSA and pan-sensitive E coli. He was initially placed on IV omnicef and added clindamycin given mild bump in WBC which was then trimmed to bactrim, which covers all the above organisms. Patient tolerated this medication well, secretions stopped, had normal bowel movements without any nausea or vomiting and was discharged home. He will receive several more days of Bactrim at home to fill a prolonged course and will need a repeat BMP in 1 week to verify kidney function with his PCP. His hb dropped a bit from baseline after the surgery, and will need follow up with his PCP for this issue. THere was no overt bleeding noted on day of discharge with stable vital signs.        Therefore, he is discharged in fair and stable condition to home with close outpatient follow-up.    The patient met 2-midnight criteria for an inpatient stay at the time of discharge.    Discharge Date  7/18/18    FOLLOW UP ITEMS POST DISCHARGE  FU with DR dukes in 1-2 weeks    DISCHARGE DIAGNOSES  Principal Problem (Resolved):    SBO (small bowel obstruction) (HCC)  POA: Yes      Overview: Long course small bowel obstruction without resolution      6/29 exploratory laparotomy, lysis of adhesion and small bowel resection      NPO awaiting return of bowel function      Nasogastric tube      Emanuel Bobby MD: General Surgery  Active Problems:    Severe protein-calorie malnutrition (HCC) POA: Yes      Overview: Patient has not eaten in greater than 2 weeks      PICC line requested to start TPN      Trend nutritional parameters    Normocytic anemia POA: Yes    HTN (hypertension) POA: Yes      Overview: Premorbid      Restart home medications once tolerating by mouth      Treat as needed    HLD (hyperlipidemia) POA: Yes      Overview: Premorbid      Resume home medications when tolerating diet    T2DM (type 2 diabetes mellitus) (HCC) POA: Yes      Overview: Premorbid      On regular insulin sliding scale  Resolved Problems:    Acute hypernatremia POA: No      Overview: Sodium trending high: multifactorial      Reduce sodium load in TPN    Renal insufficiency POA: No      Overview: Creatinine elevated with hypernatremia      MIVF and monitor urine output      7/1 Renal indices improved    Hypokalemia POA: Yes    Leukocytosis POA: Yes    Hypomagnesemia POA: Yes      FOLLOW UP  No future appointments.  [facility]  [address] [facility] [address]  [phone]    Schedule an appointment as soon as possible for a visit in 1 week      Emanuel Bobby M.D.  [address] [place] [address] [id number] [phone]    In 1 week  Follow up for bowel obstruction      MEDICATIONS ON DISCHARGE     Medication List      START taking these medications      Instructions   docusate sodium 100 MG Caps   Take 100 mg by mouth 2 Times a Day.  Dose:  100 mg     ferrous sulfate 325 (65 Fe) MG tablet   Take 1 Tab by mouth 3 times a day, with meals.  Dose:  325 mg     magnesium oxide 400 (241.3 Mg) MG Tabs tablet  Start taking on:  7/19/2018  Commonly known as:  MAG-OX   Take 1 Tab by  mouth every day.  Dose:  400 mg     sulfamethoxazole-trimethoprim 800-160 MG tablet  Commonly known as:  BACTRIM DS   Take 1 Tab by mouth every 12 hours for 8 days.  Dose:  1 Tab        CONTINUE taking these medications      Instructions   cyanocobalamin 100 MCG Tabs  Commonly known as:  VITAMIN B-12   Take 100 mcg by mouth every day.  Dose:  100 mcg     gabapentin 100 MG Caps  Commonly known as:  NEURONTIN   Take 100 mg by mouth 3 times a day.  Dose:  100 mg     metoprolol SR 25 MG Tb24  Commonly known as:  TOPROL XL   Take 25 mg by mouth every day.  Dose:  25 mg     simvastatin 5 MG Tabs  Commonly known as:  ZOCOR   Take 5 mg by mouth every evening.  Dose:  5 mg     vitamin D 1000 UNIT Tabs  Commonly known as:  cholecalciferol   Take 1,000 Units by mouth every day.  Dose:  1000 Units            Allergies  No Known Allergies    DIET  Orders Placed This Encounter   Procedures   • Diet Order Regular     Standing Status:   Standing     Number of Occurrences:   1     Order Specific Question:   Diet:     Answer:   Regular [1]       ACTIVITY  As tolerated.  Weight bearing as tolerated    CONSULTATIONS  General surgery    PROCEDURES  DATE OF SERVICE:  06/29/2018     PREOPERATIVE DIAGNOSIS:  Small-bowel obstruction.     POSTOPERATIVE DIAGNOSIS:  Small-bowel obstruction.     PROCEDURES:  1.  Exploratory laparotomy.  2.  Lysis of adhesions.  3.  Small bowel resection.    DX-CHEST-2 VIEWS   Final Result      1.  Increased inflation from prior exam.   2.  Small RIGHT pleural effusion, increased from prior exam.   3.  Bilateral lower lung subsegmental atelectasis.   4.  No pneumothorax or overt pulmonary edema.      DX-ABDOMEN FOR TUBE PLACEMENT   Final Result      NG tube overlies the gastric body.      [id number] VIEWS   Final Result      1.  There are continued air-filled loops of large and small bowel although contrast from previous small bowel series is seen in the colon. Findings would be consistent with an incomplete  bowel obstruction or ileus.      IR-PICC LINE PLACEMENT   Final Result                  Ultrasound-guided PICC placement performed by qualified nursing staff as    above.          DX-CHEST-FOR PICC LINE Perform procedure in: Patient's Room   Final Result      Right PICC line tip overlies the cavoatrial junction      Right basilar atelectasis and possible pleural fluid      Left midlung zone opacity is indeterminate, could represent scarring or consolidation. Nodule is in the differential. Follow-up to resolution is recommended      DX-SMALL BOWEL SERIES   Final Result      Findings consistent with incomplete small bowel obstruction.      DX-ABDOMEN FOR TUBE PLACEMENT   Final Result      Enteric tube projects over the distal stomach.      Dilated loops of small bowel compatible with small bowel obstruction.      OUTSIDE IMAGES-CT ABDOMEN /PELVIS   Final Result            LABORATORY  Lab Results   Component Value Date    SODIUM 137 07/18/2018    POTASSIUM 3.4 (L) 07/18/2018    CHLORIDE 102 07/18/2018    CO2 29 07/18/2018    GLUCOSE 107 (H) 07/18/2018    BUN 15 07/18/2018    CREATININE 0.74 07/18/2018        Lab Results   Component Value Date    WBC 10.8 07/18/2018    HEMOGLOBIN 7.6 (L) 07/18/2018    HEMATOCRIT 23.5 (L) 07/18/2018    PLATELETCT 418 07/18/2018        Total time of the discharge process exceeds 35 minutes.

## 2018-07-18 NOTE — CARE PLAN
Problem: Pain Management  Goal: Pain level will decrease to patient's comfort goal  Outcome: PROGRESSING AS EXPECTED  Patient denying any pain at this time.     Problem: Urinary Elimination:  Goal: Ability to reestablish a normal urinary elimination pattern will improve  Outcome: PROGRESSING AS EXPECTED  Normal urinary elimination through RUQ urostomy.

## 2018-07-18 NOTE — DISCHARGE INSTRUCTIONS
Discharge patient Home   Diet regular with 9-13 servings of fruits and vegetables   Activities as tolerated   Follow ups with PCP in 7-10 days with Dr Bobby, call for appointment   Meds per med rec sheet   No smoking, no alcohol, no caffeine   Wear seat belt in motorized vehicle   Take medications as perscribed   Keep appointments   If symptoms worsen call PCP, 911 or urgent care.    Discharge Instructions    Discharged to home by car with relative. Discharged via wheelchair, hospital escort: Yes.  Special equipment needed: Not Applicable    Be sure to schedule a follow-up appointment with your primary care doctor or any specialists as instructed.     Discharge Plan:   Smoking Cessation Offered: Patient Refused  Pneumococcal Vaccine Administered/Refused: Given (See MAR)  Influenza Vaccine Indication: Not indicated: Previously immunized this influenza season and > 8 years of age    I understand that a diet low in cholesterol, fat, and sodium is recommended for good health. Unless I have been given specific instructions below for another diet, I accept this instruction as my diet prescription.   Other diet: per MD order    Special Instructions: None    · Is patient discharged on Warfarin / Coumadin?   No     Depression / Suicide Risk    As you are discharged from this [facility] facility, it is important to learn how to keep safe from harming yourself.    Recognize the warning signs:  · Abrupt changes in personality, positive or negative- including increase in energy   · Giving away possessions  · Change in eating patterns- significant weight changes-  positive or negative  · Change in sleeping patterns- unable to sleep or sleeping all the time   · Unwillingness or inability to communicate  · Depression  · Unusual sadness, discouragement and loneliness  · Talk of wanting to die  · Neglect of personal appearance   · Rebelliousness- reckless behavior  · Withdrawal from people/activities they love  · Confusion-  inability to concentrate     If you or a loved one observes any of these behaviors or has concerns about self-harm, here's what you can do:  · Talk about it- your feelings and reasons for harming yourself  · Remove any means that you might use to hurt yourself (examples: pills, rope, extension cords, firearm)  · Get professional help from the community (Mental Health, Substance Abuse, psychological counseling)  · Do not be alone:Call your Safe Contact- someone whom you trust who will be there for you.  · Call your local CRISIS HOTLINE 965-1811 or 696-931-0087  · Call your local Children's Mobile Crisis Response Team [place] (949) 072-5066 or www.[address]  · Call the toll free National Suicide Prevention Hotlines   · National Suicide Prevention Lifeline 866-897-AOVI (9877)  · [employer] Line Network 800-SUICIDE (064-1853)    Malnutrition  Introduction  Malnutrition is any condition in which nutrition is poor. There are many forms of malnutrition. A common form is having too little of one kind of nutrient (nutritional deficiency). Nutrients include proteins, minerals, carbohydrates, fats, and vitamins. They provide the body with energy and keep the body working normally.  Malnutrition ranges from mild to severe. The condition affects the body's defense system (immune system). Because of this, people who are malnourished are more likely to develop health problems and get sick.  What are the causes?  Causes of malnutrition include:  · Eating an unbalanced diet.  · Eating too much of certain foods.  · Eating too little.  · Conditions that decrease the body's ability to use nutrients.  What increases the risk?  Risk factors include:  · Pregnancy and lactation. Women who are pregnant may become malnourished if they do not increase their nutrient intake. They are also susceptible to folic acid deficiency.  · Increasing age. The body's ability to absorb nutrients decreases with age. This can contribute to  "iron, calcium, and vitamin D deficiencies.  · Alcohol or drug dependency. Addiction often leads to a lifestyle in which proper nourishment is ignored. Dependency can also hurt the metabolism and the body's ability to absorb nutrients. Alcoholism is a major cause of thiamine deficiency and can lead to deficiencies of magnesium, zinc, and other vitamins.  · Eating disorders, such as anorexia nervosa. People with these disorders may eat too little or too much.  · Chewing or swallowing problems. People with these disorders may not eat enough.  · Certain diseases, including:  ¨ Long-lasting (chronic) diseases. Chronic diseases tend to affect the absorption of calcium, iron, and vitamins B12, A, D, E, and K.  ¨ Liver disease. Liver disease affects the storage of vitamins A and B12. It also interferes with the metabolism of protein and energy sources.  ¨ Kidney disease. Kidney disease may cause deficiencies of protein, iron, and vitamin D.  ¨ Cancer or AIDS. These diseases can cause a loss of appetite.  ¨ Cystic fibrosis. This disease can make it difficult for the body to absorb nutrients.  · Certain diets, including.  ¨ The vegetarian diet. Vegetarians are at risk for iron deficiency.  ¨ The vegan diet. Vegans are susceptible to vitamin B12, calcium, iron, vitamin D, and zinc deficiencies.  ¨ The fruitarian diet. This diet can be deficient in protein, sodium, and many micronutrients.  ¨ Many commercial \"fad\" diets, including those that claim to enhance well-being and reduce weight.  ¨ Very low calorie diets.  · Low income. People with a low income may have trouble paying for nutritious foods.  What are the signs or symptoms?  Signs and symptoms depend on the kind of malnutrition you have. Common symptoms include:  · Fatigue.  · Weakness.  · Dizziness.  · Fainting  · Weight loss.  · Poor immune response.  · Lack of menstruation.  · Hair loss.  · Poor memory.  How is this diagnosed?  Malnutrition may be diagnosed by:  · A " medical history.  · A dietary history.  · A physical exam. This may include a measurement of your body mass index (BMI).  · Blood tests.  How is this treated?  Treatments vary depending on the cause of the malnutrition. Common treatments include:  · Dietary changes.  · Dietary supplements, such as vitamins and minerals.  · Treatment of any underlying conditions.  Follow these instructions at home:  · Eat a balanced diet.  · Take dietary supplements as directed by your health care provider.  · Exercise regularly. Exercising can improve appetite.  · Keep all follow-up visits as directed by your health care provider. This is important.  How is this prevented?  Eating a well-balanced diet helps to prevent most forms of malnutrition.  Contact a health care provider if:  · You have increased weakness or fatigue.  · You faint.  · You stop menstruating.  · You have rapid hair loss.  · You have unexpected weight loss.  This information is not intended to replace advice given to you by your health care provider. Make sure you discuss any questions you have with your health care provider.  Document Released: 11/03/2006 Document Revised: 05/25/2017 Document Reviewed: 08/14/2015  © 2017 [employer]    Shortness of Breath  Shortness of breath means you have trouble breathing. Shortness of breath needs medical care right away.  HOME CARE   Do not smoke.  Avoid being around chemicals or things (paint fumes, dust) that may bother your breathing.  Rest as needed. Slowly begin your normal activities.  Only take medicines as told by your doctor.  Keep all doctor visits as told.  GET HELP RIGHT AWAY IF:   Your shortness of breath gets worse.  You feel lightheaded, pass out (faint), or have a cough that is not helped by medicine.  You cough up blood.  You have pain with breathing.  You have pain in your chest, arms, shoulders, or belly (abdomen).  You have a fever.  You cannot walk up stairs or exercise the way you normally do.  You do not  get better in the time expected.  You have a hard time doing normal activities even with rest.  You have problems with your medicines.  You have any new symptoms.  MAKE SURE YOU:  Understand these instructions.  Will watch your condition.  Will get help right away if you are not doing well or get worse.  This information is not intended to replace advice given to you by your health care provider. Make sure you discuss any questions you have with your health care provider.  Document Released: 06/05/2009 Document Revised: 12/23/2014 Document Reviewed: 03/04/2013  [employer] Interactive Patient Education © 2017 [employer]

## 2018-12-27 ENCOUNTER — HOSPITAL ENCOUNTER (OUTPATIENT)
Dept: RADIOLOGY | Facility: MEDICAL CENTER | Age: 83
End: 2018-12-27

## 2018-12-27 ENCOUNTER — HOSPITAL ENCOUNTER (OUTPATIENT)
Dept: RADIATION ONCOLOGY | Facility: MEDICAL CENTER | Age: 83
End: 2018-12-31
Attending: RADIOLOGY
Payer: COMMERCIAL

## 2018-12-27 VITALS
HEIGHT: 73 IN | SYSTOLIC BLOOD PRESSURE: 88 MMHG | DIASTOLIC BLOOD PRESSURE: 60 MMHG | TEMPERATURE: 97.8 F | WEIGHT: 119.4 LBS | BODY MASS INDEX: 15.82 KG/M2 | HEART RATE: 89 BPM

## 2018-12-27 DIAGNOSIS — C79.51 BONE METASTASIS: ICD-10-CM

## 2018-12-27 DIAGNOSIS — C67.8 MALIGNANT NEOPLASM OF OVERLAPPING SITES OF BLADDER (HCC): ICD-10-CM

## 2018-12-27 PROCEDURE — 99205 OFFICE O/P NEW HI 60 MIN: CPT | Performed by: RADIOLOGY

## 2018-12-27 PROCEDURE — 99214 OFFICE O/P EST MOD 30 MIN: CPT | Performed by: RADIOLOGY

## 2018-12-27 RX ORDER — GABAPENTIN 300 MG/1
600 CAPSULE ORAL
COMMUNITY
End: 2018-12-27

## 2018-12-27 RX ORDER — TAMSULOSIN HYDROCHLORIDE 0.4 MG/1
0.4 CAPSULE ORAL
COMMUNITY
End: 2018-12-27

## 2018-12-27 RX ORDER — LANOLIN ALCOHOL/MO/W.PET/CERES
3 CREAM (GRAM) TOPICAL
COMMUNITY
End: 2018-12-27

## 2018-12-27 RX ORDER — HYDROCODONE BITARTRATE AND ACETAMINOPHEN 10; 325 MG/1; MG/1
1-2 TABLET ORAL EVERY 6 HOURS PRN
COMMUNITY

## 2018-12-27 RX ORDER — ACETAMINOPHEN 160 MG/5ML
15 SUSPENSION ORAL EVERY 4 HOURS PRN
COMMUNITY
End: 2018-12-27

## 2018-12-27 RX ORDER — ATORVASTATIN CALCIUM 20 MG/1
20 TABLET, FILM COATED ORAL NIGHTLY
COMMUNITY
End: 2018-12-27

## 2018-12-27 RX ORDER — OXYCODONE HYDROCHLORIDE 5 MG/1
5 TABLET ORAL EVERY 4 HOURS PRN
COMMUNITY

## 2018-12-27 ASSESSMENT — PAIN SCALES - GENERAL: PAINLEVEL: 7=MODERATE-SEVERE PAIN

## 2018-12-27 NOTE — NON-PROVIDER
"Patient was seen today in clinic with Dr. Ish Mei for Metastatic Bladder Cancer.  Vitals signs and weight were obtained and pain assessment was completed.  Allergies and medications were reviewed with the patient.  Review of systems completed.     Vitals/Pain:  Vitals:    12/27/18 1041   BP: (!) 88/60   Pulse: 89   Temp: 36.6 °C (97.8 °F)   Weight: 54.2 kg (119 lb 6.4 oz)   Height: 1.854 m (6' 1\")   Pain Score: 7=Moderate-Severe Pain    PAIN:  Pain Scale: 0-10  Pain Assessement: 7/10  Pain Location, Orientation and Scale: pelvic area, low back, groin, testicles, radiation down rt leg  What makes the pain better: oxycodone    (patient is currently out of his oxycodone - took a Norco 5/325 but it does not sufficiently take care of pain . [facility] on his O[facility]codone at a higher strength).  What makes the pain worse: sitting/walking      Allergies:   Patient has no known allergies.    Current Medications:  Current Outpatient Prescriptions   Medication Sig Dispense Refill   • HYDROcodone/acetaminophen (NORCO)  MG Tab Take 1-2 Tabs by mouth every 6 hours as needed.     • oxyCODONE immediate-release (ROXICODONE) 5 MG Tab Take 5 mg by mouth every four hours as needed for Severe Pain.       No current facility-administered medications for this encounter.          PCP:  No primary care provider on file.        Stephanie Cabrera R.N.  "

## 2018-12-27 NOTE — CONSULTS
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 Trevizo today in consultation at the request of Dr. Bernstein for pelvic pain.  Originally presented in December 2017 with TURBT with 5 cm tumor involving the left ureter orifice with possible mild left distal ureter with preoperative left hydronephrosis causing distal obstruction.  Patient had a PET CT scan done January 2018 with positive lymph nodes but no metastatic disease.  [facility] with pathology showing [id number] urothelial carcinoma with pelvic carcinomatosis.  Patient was started on carboplatinum and gemcitabine for 3 cycles.  He developed a small bowel obstruction after third cycle and underwent ex lap November 15, 2018 complicated by superficial wound infection requiring wound VAC placement.  He was referred due to findings of pelvic metastasis causing severe pain.  He had recent bone scan in December 2018 which showed no evidence of pelvic bone metastasis but some areas of rib metastasis unlikely causing his pain.    PAST MEDICAL HISTORY:   Past Medical History:   Diagnosis Date   • Cancer (HCC)     bladder, prostate   • High cholesterol    • History of brachytherapy     for prostate cancer    • History of cancer chemotherapy     Carbo/Gemzar for bladder cancer   • Hx SBO    • Mitral valve prolapse    • Pacemaker        PAST SURGICAL HISTORY:  Past Surgical History:   Procedure Laterality Date   • OTHER  11/16/2018    SBO - exploratory lap, JESSICA, small bowel resection - freeing of bowel adhesion   • EXPLORATORY LAPAROTOMY  6/29/2018    Procedure: Exploratory Laparotomy, Small Bowel Resection;  Surgeon: Emanuel Bobby M.D.;  Location: SURGERY [facility];  Service: General   • LYSIS ADHESIONS  GENERAL  6/29/2018    Procedure: LYSIS ADHESIONS GENERAL;  Surgeon: Emanuel Bobby M.D.;  Location: SURGERY [facility];  Service: General   • OTHER ABDOMINAL SURGERY  02/2018    bladder and prostate removal, urostomy placed   • APPENDECTOMY     • OTHER      TURBT 12/5/17   • PACEMAKER INSERTION     • PENILE PROSTHESIS INSERTION         CURRENT MEDICATIONS:  Current Outpatient Prescriptions   Medication Sig Dispense Refill   • HYDROcodone/acetaminophen (NORCO)  MG Tab Take 1-2 Tabs by mouth every 6 hours as needed.     • oxyCODONE immediate-release (ROXICODONE) 5 MG Tab Take 5 mg by mouth every four hours as needed for Severe Pain.       No current facility-administered medications for this encounter.        ALLERGIES:    Patient has no known allergies.    FAMILY HISTORY:    Family History   Problem Relation Age of Onset   • Cancer Father         Prostate cancer   • Cancer Brother         Prostate Cancer       SOCIAL HISTORY:    Social History   Substance Use Topics   • Smoking status: Former Smoker     Types: Cigars     Quit date: 6/5/2018   • Smokeless tobacco: Never Used   • Alcohol use No      Comment:       Patient is , lives in [place], [place] and is a retired . (patient worked up to time of diagnosis 11/2018).    REVIEW OF SYSTEMS:  A greater than 10 point review of systems was completed in patient's chart on 12/27/2018 and scanned in to [facility].    The rest of the review of systems is negative and has been reviewed by me.  All are negative with relationship to this diagnosis with the exception of: Positive for weight loss, change in appetite, fatigue, chills, taste alteration, dry mouth, change of voice, hearing loss, abdominal pain, constipation, diarrhea, nausea, vomiting, joint pain, joint bone pain, dizziness, unsteady gait, shortness of breath          PHYSICAL EXAM:    Vitals:    12/27/18 1041   BP: (!) 88/60   Pulse: 89   Temp: 36.6 °C (97.8 °F)   Weight: 54.2 kg (119  "lb 6.4 oz)   Height: 1.854 m (6' 1\")          2= Ambulatory and capable of all self care, but unable to carry out any work activities.  Up and about more than 50% of waking hours.    PAIN:  Pain Scale: 0-10  Pain Assessement: 7/10  Pain Location, Orientation and Scale: pelvic area, low back, groin, testicles, radiation down rt leg  What makes the pain better: oxycodone    (patient is currently out of his oxycodone - took a Norco 5/325 but it does not sufficiently take care of pain . [facility] on his Oxycodone at a higher strength).  What makes the pain worse: sitting/walking    GENERAL: No apparent distress.  HEENT:  Pupils are equal, round, and reactive to light.  Extraocular muscles   are intact. Sclerae nonicteric.  Conjunctivae pink.  Oral cavity, tongue   protrudes midline.   NECK:  Supple without evidence of thyromegaly.  NODES:  No peripheral adenopathy of the neck, supraclavicular fossa bilaterally.  LUNGS:  Good effort  HEART:  Regular rate  ABDOMEN:  Soft. +urostomy bag  EXTREMITIES:  Without Edema.  NEUROLOGIC:  Cranial nerves II through XII were intact. 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  Per report from patient his surgeon said that he had bulky pelvic disease causing his pain which seems like a right sciatic pain but could be caused by bulky disease impinging on his right sciatic nerve.  I will discuss with his surgeon to see if there are any focal areas that we can palliate.  However explained that typically we can give total pelvic radiation for pelvic carcinomatosis as it does not have much positive benefit and can potentially cause damage to his urostomy and bowels.  I will follow-up with patient after I have " discussed case with his surgeon.  I have discussed that per pain management standpoint that he should consider a long-acting pain medication.

## 2019-01-09 ENCOUNTER — PATIENT OUTREACH (OUTPATIENT)
Dept: ONCOLOGY | Facility: MEDICAL CENTER | Age: 84
End: 2019-01-09

## 2019-01-09 NOTE — PROGRESS NOTES
Call placed to patient in regards to navigation.  I spoke with his wife who informed me that the patient passed away on Sunday 1/06/2019.  I offered my support and condolences.

## 2019-01-15 NOTE — PROGRESS NOTES
0645 Received report, introduced self to pt, reviewed labs, orders, allergies, code status    0830 urostomy intact, NG tube in place, draining brown green fluid, no complaints of pain, no nausea this AM. Bowel sounds present, minimal distension     0

## 2019-10-29 NOTE — CARE PLAN
Problem: Safety  Goal: Will remain free from falls  Outcome: PROGRESSING AS EXPECTED  Call light within reach. Hourly rounding in place. Bed at lowest position with bed brakes on. Upper side rails x 2 up.    Problem: Infection  Goal: Will remain free from infection  Outcome: PROGRESSING AS EXPECTED  New orders for PO oral abx. Given as scheduled. Monitor daily labs.       DC instructions

## 2022-05-24 NOTE — PROGRESS NOTES
Bedside report received.  Assessment complete.  A&O x 4. Patient calls appropriately.  Patient up with 1 assist and FWW.   Patient has 5/10 pain. Pt declines pain intervention other then rest.  Denies N&V. Tolerating regular diet. Poor appetite, cyclic TPN in place.  Surgical incision to midline with staples CHALO.  + void to RLQ urosotmy, + flatus  Patient denies SOB.  SCD's in place.  Patient awaiting approval of TPN for DC. Pt requesting AC/HS finger sticks to mbe DC.  3x blood redraw due to suspected contamination. Awaiting H/H results.   Review plan with of care with patient. Call light and personal belongings with in reach. Hourly rounding in place. All needs met at this time.   Structured MSE

## (undated) DEVICE — SUTURE 3-0 VICRYL PLUS SH - 8X 18 INCH (12/BX)

## (undated) DEVICE — STAPLE 60MM BLUE 3.5MM - ECHELON (12/BX)

## (undated) DEVICE — PROTECTOR ULNA NERVE - (36PR/CA)

## (undated) DEVICE — CHLORAPREP 26 ML APPLICATOR - ORANGE TINT(25/CA)

## (undated) DEVICE — KIT 2.25IN UROS 2 PC DRN - 57MM 2 1/4 INCH (5/BX)

## (undated) DEVICE — ELECTRODE DUAL RETURN W/ CORD - (50/PK)

## (undated) DEVICE — STAPLER 60MM BLUE 3.5MM WITH - STAPLE (3EA/BX)

## (undated) DEVICE — CLIP MED LG INTNL HRZN TI ESCP - (20/BX)

## (undated) DEVICE — DRESSING LEUKOMED STERILE 11.75X4IN - (50/CA)

## (undated) DEVICE — CLIP MED INTNL HRZN TI ESCP - (25/BX)

## (undated) DEVICE — SET EXTENSION WITH 2 PORTS (48EA/CA) ***PART #2C8610 IS A SUBSTITUTE*****

## (undated) DEVICE — SUTURE 0 COATED VICRYL 6-18IN - (12PK/BX)

## (undated) DEVICE — GLOVE, BIOGEL ECLIPSE, SZ 7.0, PF LTX (50/BX)

## (undated) DEVICE — DRAPE LAPAROTOMY T SHEET - (12EA/CA)

## (undated) DEVICE — ELECTRODE 850 FOAM ADHESIVE - HYDROGEL RADIOTRNSPRNT (50/PK)

## (undated) DEVICE — SUTURE GENERAL

## (undated) DEVICE — KIT ROOM DECONTAMINATION

## (undated) DEVICE — CLIP LG INTNL HRZN TI ESCP LGT - (20/BX)

## (undated) DEVICE — NEPTUNE 4 PORT MANIFOLD - (20/PK)

## (undated) DEVICE — PACK MAJOR BASIN - (2EA/CA)

## (undated) DEVICE — TUBING CLEARLINK DUO-VENT - C-FLO (48EA/CA)

## (undated) DEVICE — STAPLE 75MM LINEAR (12EA/BX)

## (undated) DEVICE — LIGASURE TISSUE FUSION  - SINGLE USE (6/CA)

## (undated) DEVICE — BLADE SURGICAL CLIPPER - (50EA/CA)

## (undated) DEVICE — SUCTION INSTRUMENT YANKAUER BULBOUS TIP W/O VENT (50EA/CA)

## (undated) DEVICE — STAPLER SKIN DISP - (6/BX 10BX/CA) VISISTAT

## (undated) DEVICE — TUBE CONNECT SUCTION CLEAR 120 X 1/4" (50EA/CA)"

## (undated) DEVICE — GLOVE BIOGEL SZ 7.5 SURGICAL PF LTX - (50PR/BX 4BX/CA)

## (undated) DEVICE — BOVIE  BLADE 6 EXTENDED - (50/PK)

## (undated) DEVICE — SENSOR SPO2 NEO LNCS ADHESIVE (20/BX) SEE USER NOTES

## (undated) DEVICE — KIT ANESTHESIA W/CIRCUIT & 3/LT BAG W/FILTER (20EA/CA)

## (undated) DEVICE — STAPLER 75MM LINEAR OPEN (3EA/BX)

## (undated) DEVICE — LACTATED RINGERS INJ 1000 ML - (14EA/CA 60CA/PF)

## (undated) DEVICE — TUBE E-T HI-LO CUFF 7.5MM (10EA/PK)

## (undated) DEVICE — HEAD HOLDER JUNIOR/ADULT

## (undated) DEVICE — GLOVE BIOGEL INDICATOR SZ 7.5 SURGICAL PF LTX - (50PR/BX 4BX/CA)

## (undated) DEVICE — SODIUM CHL IRRIGATION 0.9% 1000ML (12EA/CA)

## (undated) DEVICE — GLOVE BIOGEL INDICATOR SZ 7SURGICAL PF LTX - (50/BX 4BX/CA)

## (undated) DEVICE — GOWN WARMING STANDARD FLEX - (30/CA)

## (undated) DEVICE — GLOVE BIOGEL SZ 7 SURGICAL PF LTX - (50PR/BX 4BX/CA)

## (undated) DEVICE — SUTURE 1 PDS II PLUS TP-1 - (12PK/BX)

## (undated) DEVICE — PAD LAP STERILE 18 X 18 - (5/PK 40PK/CA)

## (undated) DEVICE — CANISTER SUCTION 3000ML MECHANICAL FILTER AUTO SHUTOFF MEDI-VAC NONSTERILE LF DISP  (40EA/CA)

## (undated) DEVICE — SET LEADWIRE 5 LEAD BEDSIDE DISPOSABLE ECG (1SET OF 5/EA)

## (undated) DEVICE — MASK ANESTHESIA ADULT  - (100/CA)